# Patient Record
Sex: FEMALE | Race: WHITE | Employment: FULL TIME | URBAN - METROPOLITAN AREA
[De-identification: names, ages, dates, MRNs, and addresses within clinical notes are randomized per-mention and may not be internally consistent; named-entity substitution may affect disease eponyms.]

---

## 2017-02-28 ENCOUNTER — OFFICE VISIT (OUTPATIENT)
Dept: FAMILY MEDICINE CLINIC | Age: 32
End: 2017-02-28

## 2017-02-28 VITALS
SYSTOLIC BLOOD PRESSURE: 130 MMHG | RESPIRATION RATE: 20 BRPM | HEART RATE: 130 BPM | WEIGHT: 293 LBS | BODY MASS INDEX: 50.02 KG/M2 | TEMPERATURE: 98.9 F | DIASTOLIC BLOOD PRESSURE: 91 MMHG | OXYGEN SATURATION: 95 % | HEIGHT: 64 IN

## 2017-02-28 DIAGNOSIS — J02.9 SORE THROAT: Primary | ICD-10-CM

## 2017-02-28 DIAGNOSIS — J11.1 INFLUENZA: ICD-10-CM

## 2017-02-28 LAB
FLUAV+FLUBV AG NOSE QL IA.RAPID: NEGATIVE POS/NEG
FLUAV+FLUBV AG NOSE QL IA.RAPID: NEGATIVE POS/NEG
S PYO AG THROAT QL: NEGATIVE
VALID INTERNAL CONTROL?: YES
VALID INTERNAL CONTROL?: YES

## 2017-02-28 RX ORDER — OSELTAMIVIR PHOSPHATE 75 MG/1
75 CAPSULE ORAL 2 TIMES DAILY
Qty: 10 CAP | Refills: 0 | Status: SHIPPED | OUTPATIENT
Start: 2017-02-28 | End: 2017-03-05

## 2017-02-28 NOTE — PROGRESS NOTES
Subjective: Deanna Montenegro is a 27 y.o. female who present complaining of flu-like symptoms: fevers, chills, myalgias, congestion, sore throat and cough for 1 days. Family member diagnosed with strep recently. She has taken OTC DayQuil. She denies dyspnea or wheezing. Smoking status: non-smoker. Flu vaccine status: vaccinated currently. Relevant PMH: obesity. Review of Systems  Review of Systems   Constitutional: Positive for chills, fever and malaise/fatigue. HENT: Positive for congestion and sore throat. Eyes: Negative for blurred vision. Respiratory: Positive for cough. Negative for sputum production, shortness of breath and wheezing. Cardiovascular: Negative for chest pain. Gastrointestinal: Negative for abdominal pain. Musculoskeletal: Positive for myalgias. Skin: Negative for rash. Neurological: Positive for headaches. Patient Active Problem List   Diagnosis Code    HTN (hypertension) I10    Obesity E66.9    Pre-diabetes R73.03     Patient Active Problem List    Diagnosis Date Noted    Obesity 12/24/2015    Pre-diabetes 12/24/2015    HTN (hypertension) 01/27/2011     Current Outpatient Prescriptions   Medication Sig Dispense Refill    NIFEdipine ER (PROCARDIA XL) 90 mg ER tablet TAKE ONE TABLET BY MOUTH ONCE DAILY 30 Tab 1    lisinopril (PRINIVIL, ZESTRIL) 20 mg tablet TAKE ONE TABLET BY MOUTH ONCE DAILY 30 Tab 5    levonorgestrel (MIRENA) 20 mcg/24 hr IUD 1 Each by IntraUTERine route once. No Known Allergies  Past Medical History:   Diagnosis Date    HTN (hypertension) 1/27/2011    Pap smear 2012    total care for women.       Past Surgical History:   Procedure Laterality Date    HX HEENT  2 months old    tear duct stenosis    HX UROLOGICAL      one kidney a little small, but OK with maturation     Family History   Problem Relation Age of Onset    Hypertension Mother     Elevated Lipids Mother     Hypertension Maternal Grandmother     Elevated Lipids Maternal Grandmother     Stroke Maternal Grandmother     Hypertension Maternal Grandfather     Elevated Lipids Maternal Grandfather     Heart Disease Maternal Grandfather     Hypertension Paternal Grandmother     Elevated Lipids Paternal Grandmother     Hypertension Paternal Grandfather     Elevated Lipids Paternal Grandfather     Cancer Maternal Aunt      leukemia    Hypertension Paternal Aunt      Social History   Substance Use Topics    Smoking status: Never Smoker    Smokeless tobacco: Never Used    Alcohol use 0.0 oz/week     0 Standard drinks or equivalent per week      Comment: occasionally, socially       Objective:     Visit Vitals    BP (!) 130/91    Pulse (!) 130    Temp 98.9 °F (37.2 °C)    Resp 20    Ht 5' 4\" (1.626 m)    Wt 295 lb (133.8 kg)    SpO2 95%    BMI 50.64 kg/m2       Physical Exam   Constitutional: She is oriented to person, place, and time. She appears well-developed and well-nourished. She appears ill. HENT:   Head: Normocephalic and atraumatic. Right Ear: External ear and ear canal normal. Tympanic membrane is not bulging. Left Ear: External ear and ear canal normal. Tympanic membrane is not bulging. Nose: Nose normal.   Mouth/Throat: Oropharynx is clear and moist.   Eyes: Conjunctivae are normal. Pupils are equal, round, and reactive to light. Neck: Normal range of motion. Cardiovascular: Regular rhythm and normal heart sounds. No murmur heard. Pulmonary/Chest: Effort normal and breath sounds normal.   Abdominal: Soft. Lymphadenopathy:     She has no cervical adenopathy. Neurological: She is alert and oriented to person, place, and time. Skin: Skin is warm, dry and intact. Psychiatric: She has a normal mood and affect.      Results for orders placed or performed in visit on 02/28/17   AMB POC CHARLES INFLUENZA A/B TEST   Result Value Ref Range    VALID INTERNAL CONTROL POC Yes     Influenza A Ag POC Negative Negative Pos/Neg    Influenza B Ag POC Negative Negative Pos/Neg   AMB POC RAPID STREP A   Result Value Ref Range    VALID INTERNAL CONTROL POC Yes     Group A Strep Ag Negative Negative       Assessment/Plan:   Influenza very likely from clinical presentation and seasonal pattern  Considerations for specific influenza anti-viral therapy: symptoms present < 48 hours, antiviral therapy is indicated     Symptomatic therapy suggested: rest, increase fluids, gargle prn for sore throat, apply heat to sinuses prn, OTC acetaminophen and call prn if symptoms persist or worsen. Call or return to clinic prn if these symptoms worsen or fail to improve as anticipated.

## 2017-02-28 NOTE — MR AVS SNAPSHOT
Visit Information Date & Time Provider Department Dept. Phone Encounter #  
 2/28/2017 11:00 AM FAST 31 [facility] [phone] [id number] Follow-up Instructions Return if symptoms worsen or fail to improve. Upcoming Health Maintenance Date Due  
 PAP AKA CERVICAL CYTOLOGY 5/1/2015 INFLUENZA AGE 9 TO ADULT 8/1/2016 DTaP/Tdap/Td series (2 - Td) 11/1/2022 Allergies as of 2/28/2017  Review Complete On: 2/28/2017 By: Tia Watson NP No Known Allergies Current Immunizations  Never Reviewed Name Date Tdap 11/1/2012 Not reviewed this visit You Were Diagnosed With   
  
 Codes Comments Sore throat    -  Primary ICD-10-CM: J02.9 ICD-9-CM: 131 Influenza     ICD-10-CM: J11.1 ICD-9-CM: 487. 1 Vitals BP  
  
  
  
  
  
 (!) 130/91 BMI and BSA Data Body Mass Index Body Surface Area  
 50.64 kg/m 2 2.46 m 2 Preferred Pharmacy Pharmacy Name Phone [address] [phone] Your Updated Medication List  
  
   
This list is accurate as of: 2/28/17 11:21 AM.  Always use your most recent med list.  
  
  
  
  
 lisinopril 20 mg tablet Commonly known as:  PRINIVIL, ZESTRIL  
TAKE ONE TABLET BY MOUTH ONCE DAILY MIRENA 20 mcg/24 hr (5 years) IUD Generic drug:  levonorgestrel 1 Each by IntraUTERine route once. NIFEdipine ER 90 mg ER tablet Commonly known as:  PROCARDIA XL  
TAKE ONE TABLET BY MOUTH ONCE DAILY  
  
 oseltamivir 75 mg capsule Commonly known as:  TAMIFLU Take 1 Cap by mouth two (2) times a day for 5 days. Prescriptions Sent to Pharmacy Refills  
 oseltamivir (TAMIFLU) 75 mg capsule 0 Sig: Take 1 Cap by mouth two (2) times a day for 5 days. Class: Normal  
 Pharmacy: [id number] [place] [address], [address] [place] [address] #: [phone]  Route: Oral  
  
 We Performed the Following AMB POC RAPID STREP A [99894 CPT(R)] AMB POC CHARLES INFLUENZA A/B TEST [06439 CPT(R)] Follow-up Instructions Return if symptoms worsen or fail to improve. Patient Instructions Influenza (Flu): Care Instructions Your Care Instructions Influenza (flu) is an infection in the lungs and breathing passages. It is caused by the influenza virus. There are different strains, or types, of the flu virus from year to year. Unlike the common cold, the flu comes on suddenly and the symptoms, such as a cough, congestion, fever, chills, fatigue, aches, and pains, are more severe. These symptoms may last up to 10 days. Although the flu can make you feel very sick, it usually doesn't cause serious health problems. Home treatment is usually all you need for flu symptoms. But your doctor may prescribe antiviral medicine to prevent other health problems, such as pneumonia, from developing. Older people and those who have a long-term health condition, such as lung disease, are most at risk for having pneumonia or other health problems. Follow-up care is a key part of your treatment and safety. Be sure to make and go to all appointments, and call your doctor if you are having problems. Its also a good idea to know your test results and keep a list of the medicines you take. How can you care for yourself at home? · Get plenty of rest. 
· Drink plenty of fluids, enough so that your urine is light yellow or clear like water. If you have kidney, heart, or liver disease and have to limit fluids, talk with your doctor before you increase the amount of fluids you drink. · Take an over-the-counter pain medicine if needed, such as acetaminophen (Tylenol), ibuprofen (Advil, Motrin), or naproxen (Aleve), to relieve fever, headache, and muscle aches. Read and follow all instructions on the label. No one younger than 20 should take aspirin.  It has been linked to Reye syndrome, a serious illness. · Do not smoke. Smoking can make the flu worse. If you need help quitting, talk to your doctor about stop-smoking programs and medicines. These can increase your chances of quitting for good. · Breathe moist air from a hot shower or from a sink filled with hot water to help clear a stuffy nose. · Before you use cough and cold medicines, check the label. These medicines may not be safe for young children or for people with certain health problems. · If the skin around your nose and lips becomes sore, put some petroleum jelly on the area. · To ease coughing: ¨ Drink fluids to soothe a scratchy throat. ¨ Suck on cough drops or plain hard candy. ¨ Take an over-the-counter cough medicine that contains dextromethorphan to help you get some sleep. Read and follow all instructions on the label. ¨ Raise your head at night with an extra pillow. This may help you rest if coughing keeps you awake. · Take any prescribed medicine exactly as directed. Call your doctor if you think you are having a problem with your medicine. To avoid spreading the flu · Wash your hands regularly, and keep your hands away from your face. · Stay home from school, work, and other public places until you are feeling better and your fever has been gone for at least 24 hours. The fever needs to have gone away on its own without the help of medicine. · Ask people living with you to talk to their doctors about preventing the flu. They may get antiviral medicine to keep from getting the flu from you. · To prevent the flu in the future, get a flu vaccine every fall. Encourage people living with you to get the vaccine. · Cover your mouth when you cough or sneeze. When should you call for help? Call 911 anytime you think you may need emergency care. For example, call if: 
· You have severe trouble breathing. Call your doctor now or seek immediate medical care if: 
· You have new or worse trouble breathing. · You seem to be getting much sicker. · You feel very sleepy or confused. · You have a new or higher fever. · You get a new rash. Watch closely for changes in your health, and be sure to contact your doctor if: 
· You begin to get better and then get worse. · You are not getting better after 1 week. Where can you learn more? Go to http://rickie-manjit.info/. Enter J919 in the search box to learn more about \"Influenza (Flu): Care Instructions. \" Current as of: May 23, 2016 Content Version: 11.1 © 8584-5411 [employer]. Care instructions adapted under license by [employer] (which disclaims liability or warranty for this information). If you have questions about a medical condition or this instruction, always ask your healthcare professional. [address] any warranty or liability for your use of this information. Introducing [facility]! Dear Scar Tyler: Thank you for requesting a 5o9 account. Our records indicate that you already have an active 5o9 account. You can access your account anytime at [email or web address] Did you know that you can access your hospital and ER discharge instructions at any time in 5o9? You can also review all of your test results from your hospital stay or ER visit. Additional Information If you have questions, please visit the Frequently Asked Questions section of the 5o9 website at [email or web address] Remember, 5o9 is NOT to be used for urgent needs. For medical emergencies, dial 911. Now available from your iPhone and Android! Please provide this summary of care documentation to your next provider. Your primary care clinician is listed as Maeve Lazar. If you have any questions after today's visit, please call [phone].

## 2017-02-28 NOTE — PATIENT INSTRUCTIONS
Influenza (Flu): Care Instructions  Your Care Instructions  Influenza (flu) is an infection in the lungs and breathing passages. It is caused by the influenza virus. There are different strains, or types, of the flu virus from year to year. Unlike the common cold, the flu comes on suddenly and the symptoms, such as a cough, congestion, fever, chills, fatigue, aches, and pains, are more severe. These symptoms may last up to 10 days. Although the flu can make you feel very sick, it usually doesn't cause serious health problems. Home treatment is usually all you need for flu symptoms. But your doctor may prescribe antiviral medicine to prevent other health problems, such as pneumonia, from developing. Older people and those who have a long-term health condition, such as lung disease, are most at risk for having pneumonia or other health problems. Follow-up care is a key part of your treatment and safety. Be sure to make and go to all appointments, and call your doctor if you are having problems. Its also a good idea to know your test results and keep a list of the medicines you take. How can you care for yourself at home? · Get plenty of rest.  · Drink plenty of fluids, enough so that your urine is light yellow or clear like water. If you have kidney, heart, or liver disease and have to limit fluids, talk with your doctor before you increase the amount of fluids you drink. · Take an over-the-counter pain medicine if needed, such as acetaminophen (Tylenol), ibuprofen (Advil, Motrin), or naproxen (Aleve), to relieve fever, headache, and muscle aches. Read and follow all instructions on the label. No one younger than 20 should take aspirin. It has been linked to Reye syndrome, a serious illness. · Do not smoke. Smoking can make the flu worse. If you need help quitting, talk to your doctor about stop-smoking programs and medicines. These can increase your chances of quitting for good.   · Breathe moist air from a hot shower or from a sink filled with hot water to help clear a stuffy nose. · Before you use cough and cold medicines, check the label. These medicines may not be safe for young children or for people with certain health problems. · If the skin around your nose and lips becomes sore, put some petroleum jelly on the area. · To ease coughing:  ¨ Drink fluids to soothe a scratchy throat. ¨ Suck on cough drops or plain hard candy. ¨ Take an over-the-counter cough medicine that contains dextromethorphan to help you get some sleep. Read and follow all instructions on the label. ¨ Raise your head at night with an extra pillow. This may help you rest if coughing keeps you awake. · Take any prescribed medicine exactly as directed. Call your doctor if you think you are having a problem with your medicine. To avoid spreading the flu  · Wash your hands regularly, and keep your hands away from your face. · Stay home from school, work, and other public places until you are feeling better and your fever has been gone for at least 24 hours. The fever needs to have gone away on its own without the help of medicine. · Ask people living with you to talk to their doctors about preventing the flu. They may get antiviral medicine to keep from getting the flu from you. · To prevent the flu in the future, get a flu vaccine every fall. Encourage people living with you to get the vaccine. · Cover your mouth when you cough or sneeze. When should you call for help? Call 911 anytime you think you may need emergency care. For example, call if:  · You have severe trouble breathing. Call your doctor now or seek immediate medical care if:  · You have new or worse trouble breathing. · You seem to be getting much sicker. · You feel very sleepy or confused. · You have a new or higher fever. · You get a new rash.   Watch closely for changes in your health, and be sure to contact your doctor if:  · You begin to get better and then get worse. · You are not getting better after 1 week. Where can you learn more? Go to http://rickie-manjit.info/. Enter J220 in the search box to learn more about \"Influenza (Flu): Care Instructions. \"  Current as of: May 23, 2016  Content Version: 11.1  © 4575-3687 [employer]. Care instructions adapted under license by [employer] (which disclaims liability or warranty for this information). If you have questions about a medical condition or this instruction, always ask your healthcare professional. [address] any warranty or liability for your use of this information.

## 2017-04-26 ENCOUNTER — OFFICE VISIT (OUTPATIENT)
Dept: INTERNAL MEDICINE CLINIC | Age: 32
End: 2017-04-26

## 2017-04-26 VITALS
HEART RATE: 97 BPM | DIASTOLIC BLOOD PRESSURE: 97 MMHG | SYSTOLIC BLOOD PRESSURE: 149 MMHG | OXYGEN SATURATION: 100 % | TEMPERATURE: 98 F | HEIGHT: 64 IN | WEIGHT: 293 LBS | BODY MASS INDEX: 50.02 KG/M2 | RESPIRATION RATE: 20 BRPM

## 2017-04-26 DIAGNOSIS — E66.01 OBESITY, MORBID, BMI 50 OR HIGHER (HCC): ICD-10-CM

## 2017-04-26 DIAGNOSIS — I10 ESSENTIAL HYPERTENSION: Primary | ICD-10-CM

## 2017-04-26 RX ORDER — LISINOPRIL 20 MG/1
TABLET ORAL
Qty: 90 TAB | Refills: 1 | Status: SHIPPED | OUTPATIENT
Start: 2017-04-26 | End: 2017-09-06 | Stop reason: SDUPTHER

## 2017-04-26 RX ORDER — NIFEDIPINE 90 MG/1
TABLET, EXTENDED RELEASE ORAL
Qty: 90 TAB | Refills: 1 | Status: SHIPPED | OUTPATIENT
Start: 2017-04-26 | End: 2017-09-06 | Stop reason: SDUPTHER

## 2017-04-26 NOTE — PROGRESS NOTES
HISTORY OF PRESENT ILLNESS  Josy Ching is a 27 y.o. female. Here today for routine follow up, requesting refill of her BP meds. No complaints today. Hypertension    The history is provided by the patient. This is a chronic problem. The current episode started more than 1 week ago. Progression since onset: she hasbeen low on her BP meds for several weeks, has been taking these only sporatically. Took one tablet just prior to coming here. Pertinent negatives include no chest pain, no palpitations, no blurred vision, no headaches, no shortness of breath, no nausea and no vomiting. Risk factors include hypertension, obesity and family history. Obesity  She has gained 12 lbs in the last year. She tells me that she did do the jaya fast earlier this year, following a low carb low calorie diet and was able to lose 15 lbs, but then gained it all back once she finished. She states that she has been eating out frequently because her  now work on night shift. She is now interested in the MSWL program because she feels she can not get the weight off on her own. She continues to follow with [facility] for her well woman checks, she states she is due for follow up with them. She has a mirena, due for change 1/2018. Past Medical History:   Diagnosis Date    HTN (hypertension) 1/27/2011    Pap smear 2012    [facility]. Past Surgical History:   Procedure Laterality Date    HX HEENT  2 months old    tear duct stenosis    HX UROLOGICAL      one kidney a little small, but OK with maturation     Current Outpatient Prescriptions   Medication Sig    NIFEdipine ER (PROCARDIA XL) 90 mg ER tablet TAKE ONE TABLET BY MOUTH ONCE DAILY    lisinopril (PRINIVIL, ZESTRIL) 20 mg tablet TAKE ONE TABLET BY MOUTH ONCE DAILY    levonorgestrel (MIRENA) 20 mcg/24 hr IUD 1 Each by IntraUTERine route once. No current facility-administered medications for this visit.       Allergies and Intolerances:   No Known Allergies  Family History:   Family History   Problem Relation Age of Onset    Hypertension Mother     Elevated Lipids Mother     Hypertension Maternal Grandmother     Elevated Lipids Maternal Grandmother     Stroke Maternal Grandmother     Hypertension Maternal Grandfather     Elevated Lipids Maternal Grandfather     Heart Disease Maternal Grandfather     Hypertension Paternal Grandmother     Elevated Lipids Paternal Grandmother     Hypertension Paternal Grandfather     Elevated Lipids Paternal Grandfather     Cancer Maternal Aunt      leukemia    Hypertension Paternal Aunt         Social History:   She  reports that she has never smoked. She has never used smokeless tobacco.  She  reports that she drinks alcohol. Vitals:   Visit Vitals    BP (!) 149/97 (BP 1 Location: Left arm, BP Patient Position: Sitting)    Pulse 97    Temp 98 °F (36.7 °C) (Oral)    Resp 20    Ht 5' 4\" (1.626 m)    Wt 307 lb (139.3 kg)    SpO2 100%    BMI 52.7 kg/m2        Body surface area is 2.51 meters squared. BP Readings from Last 3 Encounters:   04/26/17 (!) 149/97   02/28/17 (!) 130/91   02/02/16 (!) 132/97        Wt Readings from Last 3 Encounters:   04/26/17 307 lb (139.3 kg)   02/28/17 295 lb (133.8 kg)   02/02/16 288 lb (130.6 kg)        Case and notes discussed with MA and reviewed with patient for accuracy. I have personally reviewed and subsequently discussed with the MA any pertinent, preliminary and incomplete elements of the history related to the chief complaint that were recorded by the MA in the patients chart during the initial rooming of the patient. As I have explored the necessary and required elements in detail with the patient during my personal interview with them, I have personally verified, clarified, amended, added to and/or revised said elements based on information acquired during during the interview.         Review of Systems   Constitutional: Negative for chills and fever. HENT: Negative for congestion, ear pain and sore throat. Eyes: Negative for blurred vision and double vision. Respiratory: Negative for shortness of breath and wheezing. Cardiovascular: Negative for chest pain and palpitations. Gastrointestinal: Negative for abdominal pain, blood in stool, constipation, diarrhea, melena, nausea and vomiting. Genitourinary: Negative for dysuria and hematuria. Skin: Negative for itching and rash. Neurological: Negative for seizures, loss of consciousness and headaches. Physical Exam   Constitutional: She appears well-developed and well-nourished. No distress. Obese. HENT:   Head: Normocephalic and atraumatic. Nose: Nose normal.   Mouth/Throat: Uvula is midline, oropharynx is clear and moist and mucous membranes are normal.   Eyes: Conjunctivae are normal. Pupils are equal, round, and reactive to light. Cardiovascular: Normal rate, regular rhythm and normal heart sounds. Pulmonary/Chest: Effort normal and breath sounds normal. No respiratory distress. She has no wheezes. Abdominal: Soft. Bowel sounds are normal. She exhibits no distension. There is no tenderness. Musculoskeletal: She exhibits no edema. Neurological: She is alert. Skin: Skin is warm and dry. No rash noted. Psychiatric: She has a normal mood and affect. Her behavior is normal.   Nursing note and vitals reviewed. ASSESSMENT and PLAN    ICD-10-CM ICD-9-CM    1. Essential hypertension: elevated today. Advised her to always take her BP pills daily as directed. Refilled these for her today, advised her to return in 2 weeks for a BP recheck once she starts taking these regularly again. She agrees to plan. Discussed great need for weight loss today as well.   I10 401.9 NIFEdipine ER (PROCARDIA XL) 90 mg ER tablet      lisinopril (PRINIVIL, ZESTRIL) 20 mg tablet      METABOLIC PANEL, COMPREHENSIVE      URINALYSIS W/ RFLX MICROSCOPIC      TSH 3RD GENERATION      LIPID PANEL      HEMOGLOBIN A1C WITH EAG      CBC WITH AUTOMATED DIFF   2. Ob[facility], morbid, [facility] or [facility] spent much time discussing the seriousness of need for weight loss. Resources given today on calorie and carb counting. She is interested int he MSWL program now, and will be signing up for this as well. E66.01 278.01 NIFEdipine ER (PROCARDIA XL) 90 mg ER tablet      lisinopril (PRINIVIL, ZESTRIL) 20 mg tablet      METABOLIC PANEL, COMPREHENSIVE      URINALYSIS W/ RFLX MICROSCOPIC      TSH 3RD GENERATION      LIPID PANEL      HEMOGLOBIN A1C WITH EAG      CBC WITH AUTOMATED DIFF     Cedric Almanza was seen today for hypertension and medication refill. Diagnoses and all orders for this visit:    Essential hypertension  -     NIFEdipine ER (PROCARDIA XL) 90 mg ER tablet; TAKE ONE TABLET BY MOUTH ONCE DAILY  -     lisinopril (PRINIVIL, ZESTRIL) 20 mg tablet; TAKE ONE TABLET BY MOUTH ONCE DAILY  -     METABOLIC PANEL, COMPREHENSIVE; Future  -     URINALYSIS W/ RFLX MICROSCOPIC; Future  -     TSH 3RD GENERATION; Future  -     LIPID PANEL; Future  -     HEMOGLOBIN A1C WITH EAG; Future  -     CBC WITH AUTOMATED DIFF; Future    Obesity, morbid, BMI 50 or higher ([facility])  -     NIFEdipine ER (PROCARDIA XL) 90 mg ER tablet; TAKE ONE TABLET BY MOUTH ONCE DAILY  -     lisinopril (PRINIVIL, ZESTRIL) 20 mg tablet; TAKE ONE TABLET BY MOUTH ONCE DAILY  -     METABOLIC PANEL, COMPREHENSIVE; Future  -     URINALYSIS W/ RFLX MICROSCOPIC; Future  -     TSH 3RD GENERATION; Future  -     LIPID PANEL; Future  -     HEMOGLOBIN A1C WITH EAG; Future  -     CBC WITH AUTOMATED DIFF; Future      Follow-up Disposition:  Return in about 2 weeks (around 5/10/2017), or if symptoms worsen or fail to improve. 2 week BP check. Notified patient that I will be leaving the state, she would like to continue to follow a this practice with Dr Bella Krishnan. The plan of care was discussed with the patient, who verbalizes understanding. Discussed all medications, side effects with patient. The patient is to follow up as scheduled and will report to the ED or the office if symptoms change or increase. The patient has voiced understanding and will comply to plan of care.

## 2017-04-26 NOTE — PATIENT INSTRUCTIONS
Health Maintenance Due   Topic Date Due    PAP AKA CERVICAL CYTOLOGY  05/01/2015    INFLUENZA AGE 9 TO ADULT  08/01/2016     Patient was given a copy of the Advanced Directive Form and understands to bring it in once completed. Call to schedule your well woman exam exam.          High Blood Pressure: Care Instructions  Your Care Instructions  If your blood pressure is usually above 140/90, you have high blood pressure, or hypertension. That means the top number is 140 or higher or the bottom number is 90 or higher, or both. Despite what a lot of people think, high blood pressure usually doesn't cause headaches or make you feel dizzy or lightheaded. It usually has no symptoms. But it does increase your risk for heart attack, stroke, and kidney or eye damage. The higher your blood pressure, the more your risk increases. Your doctor will give you a goal for your blood pressure. Your goal will be based on your health and your age. An example of a goal is to keep your blood pressure below 140/90. Lifestyle changes, such as eating healthy and being active, are always important to help lower blood pressure. You might also take medicine to reach your blood pressure goal.  Follow-up care is a key part of your treatment and safety. Be sure to make and go to all appointments, and call your doctor if you are having problems. It's also a good idea to know your test results and keep a list of the medicines you take. How can you care for yourself at home? Medical treatment  · If you stop taking your medicine, your blood pressure will go back up. You may take one or more types of medicine to lower your blood pressure. Be safe with medicines. Take your medicine exactly as prescribed. Call your doctor if you think you are having a problem with your medicine. · Talk to your doctor before you start taking aspirin every day. Aspirin can help certain people lower their risk of a heart attack or stroke.  But taking aspirin isn't right for everyone, because it can cause serious bleeding. · See your doctor regularly. You may need to see the doctor more often at first or until your blood pressure comes down. · If you are taking blood pressure medicine, talk to your doctor before you take decongestants or anti-inflammatory medicine, such as ibuprofen. Some of these medicines can raise blood pressure. · Learn how to check your blood pressure at home. Lifestyle changes  · Stay at a healthy weight. This is especially important if you put on weight around the waist. Losing even 10 pounds can help you lower your blood pressure. · If your doctor recommends it, get more exercise. Walking is a good choice. Bit by bit, increase the amount you walk every day. Try for at least 30 minutes on most days of the week. You also may want to swim, bike, or do other activities. · Avoid or limit alcohol. Talk to your doctor about whether you can drink any alcohol. · Try to limit how much sodium you eat to less than 2,300 milligrams (mg) a day. Your doctor may ask you to try to eat less than 1,500 mg a day. · Eat plenty of fruits (such as bananas and oranges), vegetables, legumes, whole grains, and low-fat dairy products. · Lower the amount of saturated fat in your diet. Saturated fat is found in animal products such as milk, cheese, and meat. Limiting these foods may help you lose weight and also lower your risk for heart disease. · Do not smoke. Smoking increases your risk for heart attack and stroke. If you need help quitting, talk to your doctor about stop-smoking programs and medicines. These can increase your chances of quitting for good. When should you call for help? Call 911 anytime you think you may need emergency care. This may mean having symptoms that suggest that your blood pressure is causing a serious heart or blood vessel problem. Your blood pressure may be over 180/110. For example, call 911 if:  · You have symptoms of a heart attack. These may include:  ¨ Chest pain or pressure, or a strange feeling in the chest.  ¨ Sweating. ¨ Shortness of breath. ¨ Nausea or vomiting. ¨ Pain, pressure, or a strange feeling in the back, neck, jaw, or upper belly or in one or both shoulders or arms. ¨ Lightheadedness or sudden weakness. ¨ A fast or irregular heartbeat. · You have symptoms of a stroke. These may include:  ¨ Sudden numbness, tingling, weakness, or loss of movement in your face, arm, or leg, especially on only one side of your body. ¨ Sudden vision changes. ¨ Sudden trouble speaking. ¨ Sudden confusion or trouble understanding simple statements. ¨ Sudden problems with walking or balance. ¨ A sudden, severe headache that is different from past headaches. · You have severe back or belly pain. Do not wait until your blood pressure comes down on its own. Get help right away. Call your doctor now or seek immediate care if:  · Your blood pressure is much higher than normal (such as 180/110 or higher), but you don't have symptoms. · You think high blood pressure is causing symptoms, such as:  ¨ Severe headache. ¨ Blurry vision. Watch closely for changes in your health, and be sure to contact your doctor if:  · Your blood pressure measures 140/90 or higher at least 2 times. That means the top number is 140 or higher or the bottom number is 90 or higher, or both. · You think you may be having side effects from your blood pressure medicine. · Your blood pressure is usually normal, but it goes above normal at least 2 times. Where can you learn more? Go to http://rickie-manjit.info/. Enter S189 in the search box to learn more about \"High Blood Pressure: Care Instructions. \"  Current as of: August 8, 2016  Content Version: 11.2  © 4279-0671 [employer]. Care instructions adapted under license by [employer] (which disclaims liability or warranty for this information).  If you have questions about a medical condition or this instruction, always ask your healthcare professional. [address] any warranty or liability for your use of this information. Body Mass Index: Care Instructions  Your Care Instructions    Body mass index (BMI) can help you see if your weight is raising your risk for health problems. It uses a formula to compare how much you weigh with how tall you are. · A BMI lower than 18.5 is considered underweight. · A BMI between 18.5 and 24.9 is considered healthy. · A BMI between 25 and 29.9 is considered overweight. A BMI of 30 or higher is considered obese. If your BMI is in the normal range, it means that you have a lower risk for weight-related health problems. If your BMI is in the overweight or obese range, you may be at increased risk for weight-related health problems, such as high blood pressure, heart disease, stroke, arthritis or joint pain, and diabetes. If your BMI is in the underweight range, you may be at increased risk for health problems such as fatigue, lower protection (immunity) against illness, muscle loss, bone loss, hair loss, and hormone problems. BMI is just one measure of your risk for weight-related health problems. You may be at higher risk for health problems if you are not active, you eat an unhealthy diet, or you drink too much alcohol or use tobacco products. Follow-up care is a key part of your treatment and safety. Be sure to make and go to all appointments, and call your doctor if you are having problems. It's also a good idea to know your test results and keep a list of the medicines you take. How can you care for yourself at home? · Practice healthy eating habits. This includes eating plenty of fruits, vegetables, whole grains, lean protein, and low-fat dairy. · If your doctor recommends it, get more exercise. Walking is a good choice. Bit by bit, increase the amount you walk every day.  Try for at least 30 minutes on most days of the week. · Do not smoke. Smoking can increase your risk for health problems. If you need help quitting, talk to your doctor about stop-smoking programs and medicines. These can increase your chances of quitting for good. · Limit alcohol to 2 drinks a day for men and 1 drink a day for women. Too much alcohol can cause health problems. If you have a BMI higher than 25  · Your doctor may do other tests to check your risk for weight-related health problems. This may include measuring the distance around your waist. A waist measurement of more than 40 inches in men or 35 inches in women can increase the risk of weight-related health problems. · Talk with your doctor about steps you can take to stay healthy or improve your health. You may need to make lifestyle changes to lose weight and stay healthy, such as changing your diet and getting regular exercise. If you have a BMI lower than 18.5  · Your doctor may do other tests to check your risk for health problems. · Talk with your doctor about steps you can take to stay healthy or improve your health. You may need to make lifestyle changes to gain or maintain weight and stay healthy, such as getting more healthy foods in your diet and doing exercises to build muscle. Where can you learn more? Go to http://rickie-manjit.info/. Enter S176 in the search box to learn more about \"Body Mass Index: Care Instructions. \"  Current as of: January 23, 2017  Content Version: 11.2  © 9707-8841 [employer]. Care instructions adapted under license by [employer] (which disclaims liability or warranty for this information). If you have questions about a medical condition or this instruction, always ask your healthcare professional. [place] [address] any warranty or liability for your use of this information. Learning About Dietary Guidelines  What are the Dietary Guidelines for Americans?     Dietary Guidelines for Americans provide tips for eating well and staying healthy. This helps reduce the risk for long-term (chronic) diseases. These adult guidelines from the [place] recommend that you:  · Eat lots of fruits, vegetables, whole grains, and low-fat or nonfat dairy products. · Try to balance your eating with your activity. This helps you stay at a healthy weight. · Drink alcohol in moderation, if at all. · Limit foods high in salt, saturated fat, trans fat, and added sugar. What is MyPlate? MyPlate is the [place] government's food guide. It can help you make your own well-balanced eating plan. A balanced eating plan means that you eat enough, but not too much, and that your food gives you the nutrients you need to stay healthy. MyPlate focuses on eating plenty of whole grains, fruits, and vegetables, and on limiting fat and sugar. It is available online at www. ChooseMyPlate.gov. How can you get started? MyPlate suggests that most adults eat certain amounts from the different food groups:  Grains  Eat 5 to 8 ounces of grains each day. Half of those should be whole grains. Choose whole-grain breads, cold and cooked cereals and grains, pasta (without creamy sauces), hard rolls, or low-fat or fat-free crackers. Vegetables  Eat 2 to 3 cups of vegetables every day. They contain little if any fat. And they have lots of nutrients that help protect against heart disease. Fruits  Eat 1½ to 2 cups of fruits every day. Fruits contain very little fat but lots of nutrients. Protein foods  Most adults need 5 to 6½ ounces each day. Choose fish and lean poultry more often. Eat red meat and fried meats less often. Dried beans, tofu, and nuts are also good sources of protein. Dairy  Most adults need 3 cups of milk and milk products a day. Choose low-fat or fat-free products from this food group. If you have problems digesting milk, try eating cheese or yogurt instead.   Limit fats and oils, including those used in cooking. When you do use fats, choose oils that are liquid at room temperature (unsaturated fats). These include canola oil and olive oil. Avoid foods with trans fats, such as many fried foods, cookies, and snack foods. Where can you learn more? Go to http://rickie-manjit.info/. Enter J652 in the search box to learn more about \"Learning About Dietary Guidelines. \"  Current as of: July 26, 2016  Content Version: 11.2  © 1157-0964 [employer]. Care instructions adapted under license by [employer] (which disclaims liability or warranty for this information). If you have questions about a medical condition or this instruction, always ask your healthcare professional. [address] any warranty or liability for your use of this information.

## 2017-04-26 NOTE — PROGRESS NOTES
Chief Complaint   Patient presents with    Hypertension     follow up    Medication Refill     send to local pharmacy     1. Have you been to the ER, urgent care clinic since your last visit? Hospitalized since your last visit? No    2. Have you seen or consulted any other health care providers outside of the [address] [place] [address] since your last visit? Include any pap smears or colon screening. No    Patient was given a copy of the Advanced Directive Form and understands to bring it in once completed.

## 2017-04-27 DIAGNOSIS — E66.01 OBESITY, MORBID, BMI 50 OR HIGHER (HCC): ICD-10-CM

## 2017-04-27 DIAGNOSIS — I10 ESSENTIAL HYPERTENSION: ICD-10-CM

## 2017-05-08 ENCOUNTER — OFFICE VISIT (OUTPATIENT)
Dept: INTERNAL MEDICINE CLINIC | Age: 32
End: 2017-05-08

## 2017-05-08 VITALS
RESPIRATION RATE: 22 BRPM | WEIGHT: 293 LBS | TEMPERATURE: 98.4 F | HEIGHT: 64 IN | OXYGEN SATURATION: 98 % | HEART RATE: 78 BPM | SYSTOLIC BLOOD PRESSURE: 137 MMHG | DIASTOLIC BLOOD PRESSURE: 87 MMHG | BODY MASS INDEX: 50.02 KG/M2

## 2017-05-08 DIAGNOSIS — I10 ESSENTIAL HYPERTENSION: Primary | ICD-10-CM

## 2017-05-08 DIAGNOSIS — E66.01 OBESITY, MORBID, BMI 50 OR HIGHER (HCC): ICD-10-CM

## 2017-05-08 NOTE — PROGRESS NOTES
HISTORY OF PRESENT ILLNESS  Whitman Meigs is a 34 y.o. female. Here today for 2 week BP recheck. HPI   HTN  She has restarted both procardia XL 90mg and lisinopril 20mg daily. BP noted to be much improved today. Denies any problems with medications. Denies any CP, SOB, leg swelling, headaches. She has been working on improving her diet, following a low carb high protein diet. She has lost 1 lbs but states she did initially lose more than that, but went camping with her family and gained much of it back already. Scheduled for [facility] program this summer. Past Medical History:   Diagnosis Date    HTN (hypertension) 1/27/2011    Pap smear 2012    total care for women. Past Surgical History:   Procedure Laterality Date    HX HEENT  2 months old    tear duct stenosis    HX UROLOGICAL      one kidney a little small, but OK with maturation     Current Outpatient Prescriptions   Medication Sig    NIFEdipine ER (PROCARDIA XL) 90 mg ER tablet TAKE ONE TABLET BY MOUTH ONCE DAILY    lisinopril (PRINIVIL, ZESTRIL) 20 mg tablet TAKE ONE TABLET BY MOUTH ONCE DAILY    levonorgestrel (MIRENA) 20 mcg/24 hr IUD 1 Each by IntraUTERine route once. No current facility-administered medications for this visit. Allergies and Intolerances:   No Known Allergies  Family History:   Family History   Problem Relation Age of Onset    Hypertension Mother     Elevated Lipids Mother     Hypertension Maternal Grandmother     Elevated Lipids Maternal Grandmother     Stroke Maternal Grandmother     Hypertension Maternal Grandfather     Elevated Lipids Maternal Grandfather     Heart Disease Maternal Grandfather     Hypertension Paternal Grandmother     Elevated Lipids Paternal Grandmother     Hypertension Paternal Grandfather     Elevated Lipids Paternal Grandfather     Cancer Maternal Aunt      leukemia    Hypertension Paternal Aunt         Social History:   She  reports that she has never smoked.  She has never used smokeless tobacco.  She  reports that she drinks alcohol. Vitals:   Visit Vitals    /87    Pulse 78    Temp 98.4 °F (36.9 °C)    Resp 22    Ht 5' 4\" (1.626 m)    Wt 306 lb (138.8 kg)    SpO2 98%    BMI 52.52 kg/m2        Body surface area is 2.5 meters squared. BP Readings from Last 3 Encounters:   05/08/17 137/87   04/26/17 (!) 149/97   02/28/17 (!) 130/91        Wt Readings from Last 3 Encounters:   05/08/17 306 lb (138.8 kg)   04/26/17 307 lb (139.3 kg)   02/28/17 295 lb (133.8 kg)        Case and notes discussed with MA and reviewed with patient for accuracy. I have personally reviewed and subsequently discussed with the MA any pertinent, preliminary and incomplete elements of the history related to the chief complaint that were recorded by the MA in the patients chart during the initial rooming of the patient. As I have explored the necessary and required elements in detail with the patient during my personal interview with them, I have personally verified, clarified, amended, added to and/or revised said elements based on information acquired during during the interview. Review of Systems   Constitutional: Negative for chills and fever. HENT: Negative for congestion, ear pain and sore throat. Eyes: Negative for blurred vision and double vision. Respiratory: Negative for shortness of breath and wheezing. Cardiovascular: Negative for chest pain. Gastrointestinal: Negative for abdominal pain, constipation, diarrhea, nausea and vomiting. Genitourinary: Negative for dysuria and hematuria. Skin: Negative for itching and rash. Neurological: Negative for seizures, loss of consciousness and headaches. Physical Exam   Constitutional: She appears well-developed and well-nourished. No distress. Obese. HENT:   Head: Normocephalic and atraumatic.    Nose: Nose normal.   Mouth/Throat: Uvula is midline, oropharynx is clear and moist and mucous membranes are normal.   Eyes: Conjunctivae are normal. Pupils are equal, round, and reactive to light. Cardiovascular: Normal rate, regular rhythm and normal heart sounds. Pulmonary/Chest: Effort normal and breath sounds normal. No respiratory distress. She has no wheezes. Abdominal: Soft. Bowel sounds are normal. She exhibits no distension. There is no tenderness. Musculoskeletal: She exhibits no edema. Neurological: She is alert. Skin: Skin is warm and dry. No rash noted. Psychiatric: She has a normal mood and affect. Her behavior is normal.   Nursing note and vitals reviewed. ASSESSMENT and PLAN    ICD-10-CM ICD-9-CM    1. Essential hypertension: improved since restarting procardia xl and lisinopril. Will continue with current regiment and continue to work towards weight loss. She agrees to have labs completed this week as well. I10 401.9    2. Obesity, morbid, BMI 50 or higher ([address]): she has lost 1 lbs since visit 2 weeks ago, but states that she has been working on improving her diet, high protein, low carb options. She is scheduled for [facility] orientation this summer as well and is anxious to start this. E66.01 278.01      Alexis Roberto was seen today for blood pressure check. Diagnoses and all orders for this visit:    Essential hypertension    Obesity, morbid, BMI 50 or higher ([address]      Follow-up Disposition:  Return in about 6 months (around 11/8/2017), or if symptoms worsen or fail to improve. Notified patient that I will be leaving the state. She would like to continue to follow here at this office with Dr Chau Funes. The plan of care was discussed with the patient, who verbalizes understanding. Discussed all medications, side effects with patient. The patient is to follow up as scheduled and will report to the ED or the office if symptoms change or increase. The patient has voiced understanding and will comply to plan of care.

## 2017-05-08 NOTE — MR AVS SNAPSHOT
Visit Information Date & Time Provider Department Dept. Phone Encounter #  
 5/8/2017 11:30 AM Agustin Kang Internist of [address] [id number] Follow-up Instructions Return in about 6 months (around 11/8/2017), or if symptoms worsen or fail to improve. Your Appointments 7/20/2017  9:00 AM  
Nurse Visit with [address] Metabolic Program ([facility]) Appt Note: [place] , weight 307, height 5ft 4 HR Metabolic Program ([facility]) [phone]  
  
    
 11/8/2017  9:30 AM  
Office Visit with Caprice Scott MD  
Internist of [facility]) Appt Note: ov 6mos. gaffney [address] [facility], [place] [address] [place]  
  
   
 [address] [facility] Upcoming Health Maintenance Date Due  
 PAP AKA CERVICAL CYTOLOGY 5/1/2015 INFLUENZA AGE 9 TO ADULT 8/1/2017 DTaP/Tdap/Td series (2 - Td) 11/1/2022 Allergies as of 5/8/2017  Review Complete On: 5/8/2017 By: Richy Conway NP No Known Allergies Current Immunizations  Never Reviewed Name Date Tdap 11/1/2012 Not reviewed this visit Vitals BP Pulse Temp Resp Height(growth percentile) Weight(growth percentile) 137/87 78 98.4 °F (36.9 °C) 22 5' 4\" (1.626 m) 306 lb (138.8 kg) SpO2 BMI OB Status Smoking Status 98% 52.52 kg/m2 IUD Never Smoker Vitals History BMI and BSA Data Body Mass Index Body Surface Area 52.52 kg/m 2 2.5 m 2 Preferred Pharmacy Pharmacy Name Phone [id number], [address] [phone] Your Updated Medication List  
  
   
This list is accurate as of: 5/8/17 12:09 PM.  Always use your most recent med list.  
  
  
  
  
 lisinopril 20 mg tablet Commonly known as:  PRINIVIL, ZESTRIL  
TAKE ONE TABLET BY MOUTH ONCE DAILY MIRENA 20 mcg/24 hr (5 years) IUD Generic drug:  levonorgestrel 1 Each by IntraUTERine route once. NIFEdipine ER 90 mg ER tablet Commonly known as:  PROCARDIA XL  
TAKE ONE TABLET BY MOUTH ONCE DAILY Follow-up Instructions Return in about 6 months (around 11/8/2017), or if symptoms worsen or fail to improve. Patient Instructions Body Mass Index: Care Instructions Your Care Instructions Body mass index (BMI) can help you see if your weight is raising your risk for health problems. It uses a formula to compare how much you weigh with how tall you are. · A BMI lower than 18.5 is considered underweight. · A BMI between 18.5 and 24.9 is considered healthy. · A BMI between 25 and 29.9 is considered overweight. A BMI of 30 or higher is considered obese. If your BMI is in the normal range, it means that you have a lower risk for weight-related health problems. If your BMI is in the overweight or obese range, you may be at increased risk for weight-related health problems, such as high blood pressure, heart disease, stroke, arthritis or joint pain, and diabetes. If your BMI is in the underweight range, you may be at increased risk for health problems such as fatigue, lower protection (immunity) against illness, muscle loss, bone loss, hair loss, and hormone problems. BMI is just one measure of your risk for weight-related health problems. You may be at higher risk for health problems if you are not active, you eat an unhealthy diet, or you drink too much alcohol or use tobacco products. Follow-up care is a key part of your treatment and safety. Be sure to make and go to all appointments, and call your doctor if you are having problems. It's also a good idea to know your test results and keep a list of the medicines you take. How can you care for yourself at home? · Practice healthy eating habits.  This includes eating plenty of fruits, vegetables, whole grains, lean protein, and low-fat dairy. · If your doctor recommends it, get more exercise. Walking is a good choice. Bit by bit, increase the amount you walk every day. Try for at least 30 minutes on most days of the week. · Do not smoke. Smoking can increase your risk for health problems. If you need help quitting, talk to your doctor about stop-smoking programs and medicines. These can increase your chances of quitting for good. · Limit alcohol to 2 drinks a day for men and 1 drink a day for women. Too much alcohol can cause health problems. If you have a BMI higher than 25 · Your doctor may do other tests to check your risk for weight-related health problems. This may include measuring the distance around your waist. A waist measurement of more than 40 inches in men or 35 inches in women can increase the risk of weight-related health problems. · Talk with your doctor about steps you can take to stay healthy or improve your health. You may need to make lifestyle changes to lose weight and stay healthy, such as changing your diet and getting regular exercise. If you have a BMI lower than 18.5 · Your doctor may do other tests to check your risk for health problems. · Talk with your doctor about steps you can take to stay healthy or improve your health. You may need to make lifestyle changes to gain or maintain weight and stay healthy, such as getting more healthy foods in your diet and doing exercises to build muscle. Where can you learn more? Go to http://rickie-manjit.info/. Enter S176 in the search box to learn more about \"Body Mass Index: Care Instructions. \" Current as of: January 23, 2017 Content Version: 11.2 © 5516-2603 [employer]wise[employer]. Care instructions adapted under license by [employer] (which disclaims liability or warranty for this information).  If you have questions about a medical condition or this instruction, always ask your healthcare professional. [address] any warranty or liability for your use of this information. High Blood Pressure: Care Instructions Your Care Instructions If your blood pressure is usually above 140/90, you have high blood pressure, or hypertension. That means the top number is 140 or higher or the bottom number is 90 or higher, or both. Despite what a lot of people think, high blood pressure usually doesn't cause headaches or make you feel dizzy or lightheaded. It usually has no symptoms. But it does increase your risk for heart attack, stroke, and kidney or eye damage. The higher your blood pressure, the more your risk increases. Your doctor will give you a goal for your blood pressure. Your goal will be based on your health and your age. An example of a goal is to keep your blood pressure below 140/90. Lifestyle changes, such as eating healthy and being active, are always important to help lower blood pressure. You might also take medicine to reach your blood pressure goal. 
Follow-up care is a key part of your treatment and safety. Be sure to make and go to all appointments, and call your doctor if you are having problems. It's also a good idea to know your test results and keep a list of the medicines you take. How can you care for yourself at home? Medical treatment · If you stop taking your medicine, your blood pressure will go back up. You may take one or more types of medicine to lower your blood pressure. Be safe with medicines. Take your medicine exactly as prescribed. Call your doctor if you think you are having a problem with your medicine. · Talk to your doctor before you start taking aspirin every day. Aspirin can help certain people lower their risk of a heart attack or stroke. But taking aspirin isn't right for everyone, because it can cause serious bleeding. · See your doctor regularly. You may need to see the doctor more often at first or until your blood pressure comes down. · If you are taking blood pressure medicine, talk to your doctor before you take decongestants or anti-inflammatory medicine, such as ibuprofen. Some of these medicines can raise blood pressure. · Learn how to check your blood pressure at home. Lifestyle changes · Stay at a healthy weight. This is especially important if you put on weight around the waist. Losing even 10 pounds can help you lower your blood pressure. · If your doctor recommends it, get more exercise. Walking is a good choice. Bit by bit, increase the amount you walk every day. Try for at least 30 minutes on most days of the week. You also may want to swim, bike, or do other activities. · Avoid or limit alcohol. Talk to your doctor about whether you can drink any alcohol. · Try to limit how much sodium you eat to less than 2,300 milligrams (mg) a day. Your doctor may ask you to try to eat less than 1,500 mg a day. · Eat plenty of fruits (such as bananas and oranges), vegetables, legumes, whole grains, and low-fat dairy products. · Lower the amount of saturated fat in your diet. Saturated fat is found in animal products such as milk, cheese, and meat. Limiting these foods may help you lose weight and also lower your risk for heart disease. · Do not smoke. Smoking increases your risk for heart attack and stroke. If you need help quitting, talk to your doctor about stop-smoking programs and medicines. These can increase your chances of quitting for good. When should you call for help? Call 911 anytime you think you may need emergency care. This may mean having symptoms that suggest that your blood pressure is causing a serious heart or blood vessel problem. Your blood pressure may be over 180/110. For example, call 911 if: 
· You have symptoms of a heart attack. These may include: ¨ Chest pain or pressure, or a strange feeling in the chest. 
¨ Sweating. ¨ Shortness of breath. ¨ Nausea or vomiting. ¨ Pain, pressure, or a strange feeling in the back, neck, jaw, or upper belly or in one or both shoulders or arms. ¨ Lightheadedness or sudden weakness. ¨ A fast or irregular heartbeat. · You have symptoms of a stroke. These may include: 
¨ Sudden numbness, tingling, weakness, or loss of movement in your face, arm, or leg, especially on only one side of your body. ¨ Sudden vision changes. ¨ Sudden trouble speaking. ¨ Sudden confusion or trouble understanding simple statements. ¨ Sudden problems with walking or balance. ¨ A sudden, severe headache that is different from past headaches. · You have severe back or belly pain. Do not wait until your blood pressure comes down on its own. Get help right away. Call your doctor now or seek immediate care if: 
· Your blood pressure is much higher than normal (such as 180/110 or higher), but you don't have symptoms. · You think high blood pressure is causing symptoms, such as: ¨ Severe headache. ¨ Blurry vision. Watch closely for changes in your health, and be sure to contact your doctor if: 
· Your blood pressure measures 140/90 or higher at least 2 times. That means the top number is 140 or higher or the bottom number is 90 or higher, or both. · You think you may be having side effects from your blood pressure medicine. · Your blood pressure is usually normal, but it goes above normal at least 2 times. Where can you learn more? Go to http://rickie-manjit.info/. Enter I747 in the search box to learn more about \"High Blood Pressure: Care Instructions. \" Current as of: August 8, 2016 Content Version: 11.2 © 1035-7479 [employer]. Care instructions adapted under license by [employer] (which disclaims liability or warranty for this information).  If you have questions about a medical condition or this instruction, always ask your healthcare professional. [address] any warranty or liability for your use of this information. Introducing [facility]! Dear Tawanna Robledo: Thank you for requesting a [employer] account. Our records indicate that you already have an active [employer] account. You can access your account anytime at [email or web address] Did you know that you can access your hospital and ER discharge instructions at any time in [employer]? You can also review all of your test results from your hospital stay or ER visit. Additional Information If you have questions, please visit the Frequently Asked Questions section of the [employer] website at [email or web address] Remember, [employer] is NOT to be used for urgent needs. For medical emergencies, dial 911. Now available from your iPhone and Android! Please provide this summary of care documentation to your next provider. Your primary care clinician is listed as Divina Dill. If you have any questions after today's visit, please call [phone].

## 2017-05-08 NOTE — PROGRESS NOTES
1. Have you been to the ER, urgent care clinic since your last visit? Hospitalized since your last visit? No    2. Have you seen or consulted any other health care providers outside of the [address] [place] [address] since your last visit? Include any pap smears or colon screening.  No

## 2017-05-08 NOTE — PATIENT INSTRUCTIONS
Body Mass Index: Care Instructions  Your Care Instructions    Body mass index (BMI) can help you see if your weight is raising your risk for health problems. It uses a formula to compare how much you weigh with how tall you are. · A BMI lower than 18.5 is considered underweight. · A BMI between 18.5 and 24.9 is considered healthy. · A BMI between 25 and 29.9 is considered overweight. A BMI of 30 or higher is considered obese. If your BMI is in the normal range, it means that you have a lower risk for weight-related health problems. If your BMI is in the overweight or obese range, you may be at increased risk for weight-related health problems, such as high blood pressure, heart disease, stroke, arthritis or joint pain, and diabetes. If your BMI is in the underweight range, you may be at increased risk for health problems such as fatigue, lower protection (immunity) against illness, muscle loss, bone loss, hair loss, and hormone problems. BMI is just one measure of your risk for weight-related health problems. You may be at higher risk for health problems if you are not active, you eat an unhealthy diet, or you drink too much alcohol or use tobacco products. Follow-up care is a key part of your treatment and safety. Be sure to make and go to all appointments, and call your doctor if you are having problems. It's also a good idea to know your test results and keep a list of the medicines you take. How can you care for yourself at home? · Practice healthy eating habits. This includes eating plenty of fruits, vegetables, whole grains, lean protein, and low-fat dairy. · If your doctor recommends it, get more exercise. Walking is a good choice. Bit by bit, increase the amount you walk every day. Try for at least 30 minutes on most days of the week. · Do not smoke. Smoking can increase your risk for health problems. If you need help quitting, talk to your doctor about stop-smoking programs and medicines. These can increase your chances of quitting for good. · Limit alcohol to 2 drinks a day for men and 1 drink a day for women. Too much alcohol can cause health problems. If you have a BMI higher than 25  · Your doctor may do other tests to check your risk for weight-related health problems. This may include measuring the distance around your waist. A waist measurement of more than 40 inches in men or 35 inches in women can increase the risk of weight-related health problems. · Talk with your doctor about steps you can take to stay healthy or improve your health. You may need to make lifestyle changes to lose weight and stay healthy, such as changing your diet and getting regular exercise. If you have a BMI lower than 18.5  · Your doctor may do other tests to check your risk for health problems. · Talk with your doctor about steps you can take to stay healthy or improve your health. You may need to make lifestyle changes to gain or maintain weight and stay healthy, such as getting more healthy foods in your diet and doing exercises to build muscle. Where can you learn more? Go to http://rickie-manjit.info/. Enter S176 in the search box to learn more about \"Body Mass Index: Care Instructions. \"  Current as of: January 23, 2017  Content Version: 11.2  © 0076-5907 [employer]. Care instructions adapted under license by [employer] (which disclaims liability or warranty for this information). If you have questions about a medical condition or this instruction, always ask your healthcare professional. [place] [address] any warranty or liability for your use of this information. High Blood Pressure: Care Instructions  Your Care Instructions  If your blood pressure is usually above 140/90, you have high blood pressure, or hypertension. That means the top number is 140 or higher or the bottom number is 90 or higher, or both.   Despite what a lot of people think, high blood pressure usually doesn't cause headaches or make you feel dizzy or lightheaded. It usually has no symptoms. But it does increase your risk for heart attack, stroke, and kidney or eye damage. The higher your blood pressure, the more your risk increases. Your doctor will give you a goal for your blood pressure. Your goal will be based on your health and your age. An example of a goal is to keep your blood pressure below 140/90. Lifestyle changes, such as eating healthy and being active, are always important to help lower blood pressure. You might also take medicine to reach your blood pressure goal.  Follow-up care is a key part of your treatment and safety. Be sure to make and go to all appointments, and call your doctor if you are having problems. It's also a good idea to know your test results and keep a list of the medicines you take. How can you care for yourself at home? Medical treatment  · If you stop taking your medicine, your blood pressure will go back up. You may take one or more types of medicine to lower your blood pressure. Be safe with medicines. Take your medicine exactly as prescribed. Call your doctor if you think you are having a problem with your medicine. · Talk to your doctor before you start taking aspirin every day. Aspirin can help certain people lower their risk of a heart attack or stroke. But taking aspirin isn't right for everyone, because it can cause serious bleeding. · See your doctor regularly. You may need to see the doctor more often at first or until your blood pressure comes down. · If you are taking blood pressure medicine, talk to your doctor before you take decongestants or anti-inflammatory medicine, such as ibuprofen. Some of these medicines can raise blood pressure. · Learn how to check your blood pressure at home. Lifestyle changes  · Stay at a healthy weight.  This is especially important if you put on weight around the waist. Losing even 10 pounds can help you lower your blood pressure. · If your doctor recommends it, get more exercise. Walking is a good choice. Bit by bit, increase the amount you walk every day. Try for at least 30 minutes on most days of the week. You also may want to swim, bike, or do other activities. · Avoid or limit alcohol. Talk to your doctor about whether you can drink any alcohol. · Try to limit how much sodium you eat to less than 2,300 milligrams (mg) a day. Your doctor may ask you to try to eat less than 1,500 mg a day. · Eat plenty of fruits (such as bananas and oranges), vegetables, legumes, whole grains, and low-fat dairy products. · Lower the amount of saturated fat in your diet. Saturated fat is found in animal products such as milk, cheese, and meat. Limiting these foods may help you lose weight and also lower your risk for heart disease. · Do not smoke. Smoking increases your risk for heart attack and stroke. If you need help quitting, talk to your doctor about stop-smoking programs and medicines. These can increase your chances of quitting for good. When should you call for help? Call 911 anytime you think you may need emergency care. This may mean having symptoms that suggest that your blood pressure is causing a serious heart or blood vessel problem. Your blood pressure may be over 180/110. For example, call 911 if:  · You have symptoms of a heart attack. These may include:  ¨ Chest pain or pressure, or a strange feeling in the chest.  ¨ Sweating. ¨ Shortness of breath. ¨ Nausea or vomiting. ¨ Pain, pressure, or a strange feeling in the back, neck, jaw, or upper belly or in one or both shoulders or arms. ¨ Lightheadedness or sudden weakness. ¨ A fast or irregular heartbeat. · You have symptoms of a stroke. These may include:  ¨ Sudden numbness, tingling, weakness, or loss of movement in your face, arm, or leg, especially on only one side of your body. ¨ Sudden vision changes.   ¨ Sudden trouble speaking. ¨ Sudden confusion or trouble understanding simple statements. ¨ Sudden problems with walking or balance. ¨ A sudden, severe headache that is different from past headaches. · You have severe back or belly pain. Do not wait until your blood pressure comes down on its own. Get help right away. Call your doctor now or seek immediate care if:  · Your blood pressure is much higher than normal (such as 180/110 or higher), but you don't have symptoms. · You think high blood pressure is causing symptoms, such as:  ¨ Severe headache. ¨ Blurry vision. Watch closely for changes in your health, and be sure to contact your doctor if:  · Your blood pressure measures 140/90 or higher at least 2 times. That means the top number is 140 or higher or the bottom number is 90 or higher, or both. · You think you may be having side effects from your blood pressure medicine. · Your blood pressure is usually normal, but it goes above normal at least 2 times. Where can you learn more? Go to http://rickie-manjit.info/. Enter E497 in the search box to learn more about \"High Blood Pressure: Care Instructions. \"  Current as of: August 8, 2016  Content Version: 11.2  © 5153-5947 [employer]. Care instructions adapted under license by [employer] (which disclaims liability or warranty for this information). If you have questions about a medical condition or this instruction, always ask your healthcare professional. [place] [address] any warranty or liability for your use of this information.

## 2017-07-13 NOTE — MR AVS SNAPSHOT
Recommend levothyroxine 50mcg oral or via NG tube   Repeat tsh within 4 weeks as o/p     Visit Information Date & Time Provider Department Dept. Phone Encounter #  
 4/26/2017  1:00 PM Roma Mejias NP Internist of [address] [place] [address] Place [id number] Follow-up Instructions Return in about 2 weeks (around 5/10/2017), or if symptoms worsen or fail to improve. Your Appointments 5/8/2017 11:30 AM  
Office Visit with Roma Mejias NP Internist of [facility] ([facility]) Appt Note: ov bp check per [employer] [address], [address] [facility]  
  
   
 [address], [address]  
  
    
 7/20/2017  9:00 AM  
Nurse Visit with [address] [place] [address],[address]  
HR Metabolic Program ([facility]) Appt Note: orientation [place] , weight 307, height 5ft 4 HR Metabolic Program ([facility]) [phone] Upcoming Health Maintenance Date Due  
 PAP AKA CERVICAL CYTOLOGY 5/1/2015 INFLUENZA AGE 9 TO ADULT 8/1/2016 DTaP/Tdap/Td series (2 - Td) 11/1/2022 Allergies as of 4/26/2017  Review Complete On: 4/26/2017 By: Roma Mejias NP No Known Allergies Current Immunizations  Never Reviewed Name Date Tdap 11/1/2012 Not reviewed this visit You Were Diagnosed With   
  
 Codes Comments Essential hypertension    -  Primary ICD-10-CM: I10 
ICD-9-CM: 401.9 Obesity, morbid, BMI 50 or higher (HCC)     ICD-10-CM: E66.01 
ICD-9-CM: 278.01 Vitals BP Pulse Temp Resp Height(growth percentile) Weight(growth percentile) (!) 149/97 (BP 1 Location: Left arm, BP Patient Position: Sitting) 97 98 °F (36.7 °C) (Oral) 20 5' 4\" (1.626 m) 307 lb (139.3 kg) SpO2 BMI OB Status Smoking Status 100% 52.7 kg/m2 IUD Never Smoker Vitals History BMI and BSA Data Body Mass Index Body Surface Area 52.7 kg/m 2 2.51 m 2 Preferred Pharmacy Pharmacy Name Phone  [facility] [address] [phone] Your Updated Medication List  
  
   
This list is accurate as of: 4/26/17  1:45 PM.  Always use your most recent med list.  
  
  
  
  
 lisinopril 20 mg tablet Commonly known as:  PRINIVIL, ZESTRIL  
TAKE ONE TABLET BY MOUTH ONCE DAILY MIRENA 20 mcg/24 hr (5 years) IUD Generic drug:  levonorgestrel 1 Each by IntraUTERine route once. NIFEdipine ER 90 mg ER tablet Commonly known as:  PROCARDIA XL  
TAKE ONE TABLET BY MOUTH ONCE DAILY Prescriptions Sent to Pharmacy Refills NIFEdipine ER (PROCARDIA XL) 90 mg ER tablet 1 Sig: TAKE ONE TABLET BY MOUTH ONCE DAILY Class: Normal  
 Pharmacy: [address] Ph #: [phone]  
 lisinopril (PRINIVIL, ZESTRIL) 20 mg tablet 1 Sig: TAKE ONE TABLET BY MOUTH ONCE DAILY Class: Normal  
 Pharmacy: [address] Ph #: [phone] Follow-up Instructions Return in about 2 weeks (around 5/10/2017), or if symptoms worsen or fail to improve. To-Do List   
 04/27/2017 Lab:  CBC WITH AUTOMATED DIFF   
  
 04/27/2017 Lab:  HEMOGLOBIN A1C WITH EAG   
  
 04/27/2017 Lab:  LIPID PANEL   
  
 04/27/2017 Lab:  METABOLIC PANEL, COMPREHENSIVE   
  
 04/27/2017 Lab:  TSH 3RD GENERATION   
  
 04/27/2017 Lab:  URINALYSIS W/ RFLX MICROSCOPIC Patient Instructions Health Maintenance Due Topic Date Due  
 PAP AKA CERVICAL CYTOLOGY  05/01/2015  INFLUENZA AGE 9 TO ADULT  08/01/2016 Patient was given a copy of the Advanced Directive Form and understands to bring it in once completed. Call to schedule your well woman exam exam.  
 
 
  
High Blood Pressure: Care Instructions Your Care Instructions If your blood pressure is usually above 140/90, you have high blood pressure, or hypertension.  That means the top number is 140 or higher or the bottom number is 90 or higher, or both. Despite what a lot of people think, high blood pressure usually doesn't cause headaches or make you feel dizzy or lightheaded. It usually has no symptoms. But it does increase your risk for heart attack, stroke, and kidney or eye damage. The higher your blood pressure, the more your risk increases. Your doctor will give you a goal for your blood pressure. Your goal will be based on your health and your age. An example of a goal is to keep your blood pressure below 140/90. Lifestyle changes, such as eating healthy and being active, are always important to help lower blood pressure. You might also take medicine to reach your blood pressure goal. 
Follow-up care is a key part of your treatment and safety. Be sure to make and go to all appointments, and call your doctor if you are having problems. It's also a good idea to know your test results and keep a list of the medicines you take. How can you care for yourself at home? Medical treatment · If you stop taking your medicine, your blood pressure will go back up. You may take one or more types of medicine to lower your blood pressure. Be safe with medicines. Take your medicine exactly as prescribed. Call your doctor if you think you are having a problem with your medicine. · Talk to your doctor before you start taking aspirin every day. Aspirin can help certain people lower their risk of a heart attack or stroke. But taking aspirin isn't right for everyone, because it can cause serious bleeding. · See your doctor regularly. You may need to see the doctor more often at first or until your blood pressure comes down. · If you are taking blood pressure medicine, talk to your doctor before you take decongestants or anti-inflammatory medicine, such as ibuprofen. Some of these medicines can raise blood pressure. · Learn how to check your blood pressure at home. Lifestyle changes · Stay at a healthy weight. This is especially important if you put on weight around the waist. Losing even 10 pounds can help you lower your blood pressure. · If your doctor recommends it, get more exercise. Walking is a good choice. Bit by bit, increase the amount you walk every day. Try for at least 30 minutes on most days of the week. You also may want to swim, bike, or do other activities. · Avoid or limit alcohol. Talk to your doctor about whether you can drink any alcohol. · Try to limit how much sodium you eat to less than 2,300 milligrams (mg) a day. Your doctor may ask you to try to eat less than 1,500 mg a day. · Eat plenty of fruits (such as bananas and oranges), vegetables, legumes, whole grains, and low-fat dairy products. · Lower the amount of saturated fat in your diet. Saturated fat is found in animal products such as milk, cheese, and meat. Limiting these foods may help you lose weight and also lower your risk for heart disease. · Do not smoke. Smoking increases your risk for heart attack and stroke. If you need help quitting, talk to your doctor about stop-smoking programs and medicines. These can increase your chances of quitting for good. When should you call for help? Call 911 anytime you think you may need emergency care. This may mean having symptoms that suggest that your blood pressure is causing a serious heart or blood vessel problem. Your blood pressure may be over 180/110. For example, call 911 if: 
· You have symptoms of a heart attack. These may include: ¨ Chest pain or pressure, or a strange feeling in the chest. 
¨ Sweating. ¨ Shortness of breath. ¨ Nausea or vomiting. ¨ Pain, pressure, or a strange feeling in the back, neck, jaw, or upper belly or in one or both shoulders or arms. ¨ Lightheadedness or sudden weakness. ¨ A fast or irregular heartbeat. · You have symptoms of a stroke. These may include: ¨ Sudden numbness, tingling, weakness, or loss of movement in your face, arm, or leg, especially on only one side of your body. ¨ Sudden vision changes. ¨ Sudden trouble speaking. ¨ Sudden confusion or trouble understanding simple statements. ¨ Sudden problems with walking or balance. ¨ A sudden, severe headache that is different from past headaches. · You have severe back or belly pain. Do not wait until your blood pressure comes down on its own. Get help right away. Call your doctor now or seek immediate care if: 
· Your blood pressure is much higher than normal (such as 180/110 or higher), but you don't have symptoms. · You think high blood pressure is causing symptoms, such as: ¨ Severe headache. ¨ Blurry vision. Watch closely for changes in your health, and be sure to contact your doctor if: 
· Your blood pressure measures 140/90 or higher at least 2 times. That means the top number is 140 or higher or the bottom number is 90 or higher, or both. · You think you may be having side effects from your blood pressure medicine. · Your blood pressure is usually normal, but it goes above normal at least 2 times. Where can you learn more? Go to http://rickie-manjit.info/. Enter E403 in the search box to learn more about \"High Blood Pressure: Care Instructions. \" Current as of: August 8, 2016 Content Version: 11.2 © 7933-0766 [employer]ges. Care instructions adapted under license by A[employer]Wizz (which disclaims liability or warranty for this information). If you have questions about a medical condition or this instruction, always ask your healthcare professional. [place] [address] any warranty or liability for your use of this information. Body Mass Index: Care Instructions Your Care Instructions Body mass index (BMI) can help you see if your weight is raising your risk for health problems. It uses a formula to compare how much you weigh with how tall you are. · A BMI lower than 18.5 is considered underweight. · A BMI between 18.5 and 24.9 is considered healthy. · A BMI between 25 and 29.9 is considered overweight. A BMI of 30 or higher is considered obese. If your BMI is in the normal range, it means that you have a lower risk for weight-related health problems. If your BMI is in the overweight or obese range, you may be at increased risk for weight-related health problems, such as high blood pressure, heart disease, stroke, arthritis or joint pain, and diabetes. If your BMI is in the underweight range, you may be at increased risk for health problems such as fatigue, lower protection (immunity) against illness, muscle loss, bone loss, hair loss, and hormone problems. BMI is just one measure of your risk for weight-related health problems. You may be at higher risk for health problems if you are not active, you eat an unhealthy diet, or you drink too much alcohol or use tobacco products. Follow-up care is a key part of your treatment and safety. Be sure to make and go to all appointments, and call your doctor if you are having problems. It's also a good idea to know your test results and keep a list of the medicines you take. How can you care for yourself at home? · Practice healthy eating habits. This includes eating plenty of fruits, vegetables, whole grains, lean protein, and low-fat dairy. · If your doctor recommends it, get more exercise. Walking is a good choice. Bit by bit, increase the amount you walk every day. Try for at least 30 minutes on most days of the week. · Do not smoke. Smoking can increase your risk for health problems. If you need help quitting, talk to your doctor about stop-smoking programs and medicines. These can increase your chances of quitting for good. · Limit alcohol to 2 drinks a day for men and 1 drink a day for women.  Too much alcohol can cause health problems. If you have a BMI higher than 25 · Your doctor may do other tests to check your risk for weight-related health problems. This may include measuring the distance around your waist. A waist measurement of more than 40 inches in men or 35 inches in women can increase the risk of weight-related health problems. · Talk with your doctor about steps you can take to stay healthy or improve your health. You may need to make lifestyle changes to lose weight and stay healthy, such as changing your diet and getting regular exercise. If you have a BMI lower than 18.5 · Your doctor may do other tests to check your risk for health problems. · Talk with your doctor about steps you can take to stay healthy or improve your health. You may need to make lifestyle changes to gain or maintain weight and stay healthy, such as getting more healthy foods in your diet and doing exercises to build muscle. Where can you learn more? Go to http://rickie-manjit.info/. Enter S176 in the search box to learn more about \"Body Mass Index: Care Instructions. \" Current as of: January 23, 2017 Content Version: 11.2 © 6392-0520 [employer]ilex[employer]. Care instructions adapted under license by AltraVax (which disclaims liability or warranty for this information). If you have questions about a medical condition or this instruction, always ask your healthcare professional. [address] any warranty or liability for your use of this information. Learning About Dietary Guidelines What are the Dietary Guidelines for Americans? Dietary Guidelines for Americans provide tips for eating well and staying healthy. This helps reduce the risk for long-term (chronic) diseases. These adult guidelines from the [place] recommend that you: 
· Eat lots of fruits, vegetables, whole grains, and low-fat or nonfat dairy products. · Try to balance your eating with your activity. This helps you stay at a healthy weight. · Drink alcohol in moderation, if at all. · Limit foods high in salt, saturated fat, trans fat, and added sugar. What is MyPlate? MyPlate is the U.S. government's food guide. It can help you make your own well-balanced eating plan. A balanced eating plan means that you eat enough, but not too much, and that your food gives you the nutrients you need to stay healthy. MyPlate focuses on eating plenty of whole grains, fruits, and vegetables, and on limiting fat and sugar. It is available online at www. ChooseMyPlate.gov. How can you get started? MyPlate suggests that most adults eat certain amounts from the different food groups: 
Grains Eat 5 to 8 ounces of grains each day. Half of those should be whole grains. Choose whole-grain breads, cold and cooked cereals and grains, pasta (without creamy sauces), hard rolls, or low-fat or fat-free crackers. Vegetables Eat 2 to 3 cups of vegetables every day. They contain little if any fat. And they have lots of nutrients that help protect against heart disease. Fruits Eat 1½ to 2 cups of fruits every day. Fruits contain very little fat but lots of nutrients. Protein foods Most adults need 5 to 6½ ounces each day. Choose fish and lean poultry more often. Eat red meat and fried meats less often. Dried beans, tofu, and nuts are also good sources of protein. Dairy Most adults need 3 cups of milk and milk products a day. Choose low-fat or fat-free products from this food group. If you have problems digesting milk, try eating cheese or yogurt instead. Limit fats and oils, including those used in cooking. When you do use fats, choose oils that are liquid at room temperature (unsaturated fats). These include canola oil and olive oil. Avoid foods with trans fats, such as many fried foods, cookies, and snack foods. Where can you learn more? Go to http://rickie-manjit.info/. Enter A780 in the search box to learn more about \"Learning About Dietary Guidelines. \" Current as of: July 26, 2016 Content Version: 11.2 © 7972-8391 [employer]ed Pharm[employer]. Care instructions adapted under license by Verix (which disclaims liability or warranty for this information). If you have questions about a medical condition or this instruction, always ask your healthcare professional. [place] [address] any warranty or liability for your use of this information. Introducing [facility]! Dear Huey [place]: Thank you for requesting a [facility] account. Our records indicate that you already have an active [facility] account. You can access your account anytime at https://"[employer] Juju [employer].". Red e App/"[employer] Did you know that you can access your hospital and ER discharge instructions at any time in [facility]? You can also review all of your test results from your hospital stay or ER visit. Additional Information If you have questions, please visit the Frequently Asked Questions section of the [facility] website at https://[employer] Point Holdings/"[employer] Ju[employer]/. Remember, [facility] is NOT to be used for urgent needs. For medical emergencies, dial 911. Now available from your iPhone and Android! Please provide this summary of care documentation to your next provider. Your primary care clinician is listed as Milan Allen. If you have any questions after today's visit, please call [phone].

## 2017-07-21 ENCOUNTER — HOSPITAL ENCOUNTER (EMERGENCY)
Age: 32
Discharge: HOME | End: 2017-07-21
Attending: EMERGENCY MEDICINE
Payer: COMMERCIAL

## 2017-07-21 VITALS
BODY MASS INDEX: 50.02 KG/M2 | DIASTOLIC BLOOD PRESSURE: 96 MMHG | SYSTOLIC BLOOD PRESSURE: 149 MMHG | RESPIRATION RATE: 15 BRPM | HEART RATE: 87 BPM | TEMPERATURE: 98 F | WEIGHT: 293 LBS | HEIGHT: 64 IN | OXYGEN SATURATION: 98 %

## 2017-07-21 DIAGNOSIS — N39.0 URINARY TRACT INFECTION WITHOUT HEMATURIA, SITE UNSPECIFIED: Primary | ICD-10-CM

## 2017-07-21 LAB
APPEARANCE UR: CLEAR
BACTERIA URNS QL MICRO: ABNORMAL /HPF
BILIRUB UR QL: NEGATIVE
COLOR UR: YELLOW
EPITH CASTS URNS QL MICRO: ABNORMAL /LPF (ref 0–5)
GLUCOSE UR STRIP.AUTO-MCNC: NEGATIVE MG/DL
HCG UR QL: NEGATIVE
HGB UR QL STRIP: ABNORMAL
KETONES UR QL STRIP.AUTO: NEGATIVE MG/DL
LEUKOCYTE ESTERASE UR QL STRIP.AUTO: ABNORMAL
NITRITE UR QL STRIP.AUTO: NEGATIVE
PH UR STRIP: 6.5 [PH] (ref 5–8)
PROT UR STRIP-MCNC: 30 MG/DL
RBC #/AREA URNS HPF: ABNORMAL /HPF (ref 0–5)
SP GR UR REFRACTOMETRY: 1.01 (ref 1–1.03)
UROBILINOGEN UR QL STRIP.AUTO: 0.2 EU/DL (ref 0.2–1)
WBC URNS QL MICRO: ABNORMAL /HPF (ref 0–4)

## 2017-07-21 PROCEDURE — 99282 EMERGENCY DEPT VISIT SF MDM: CPT

## 2017-07-21 PROCEDURE — 81025 URINE PREGNANCY TEST: CPT | Performed by: PHYSICIAN ASSISTANT

## 2017-07-21 PROCEDURE — 81001 URINALYSIS AUTO W/SCOPE: CPT | Performed by: PHYSICIAN ASSISTANT

## 2017-07-21 RX ORDER — CEPHALEXIN 500 MG/1
500 CAPSULE ORAL 2 TIMES DAILY
Qty: 20 CAP | Refills: 0 | Status: SHIPPED | OUTPATIENT
Start: 2017-07-21 | End: 2017-07-21

## 2017-07-21 RX ORDER — CEPHALEXIN 500 MG/1
500 CAPSULE ORAL 2 TIMES DAILY
Qty: 20 CAP | Refills: 0 | Status: SHIPPED | OUTPATIENT
Start: 2017-07-21 | End: 2017-07-31

## 2017-07-21 NOTE — ED TRIAGE NOTES
Patient C/O right flank pain and nausea starting this morning, worse after lunch. Denies burning, pain, and frequent urination.

## 2017-07-21 NOTE — DISCHARGE INSTRUCTIONS
Urinary Tract Infection in Women: Care Instructions  Your Care Instructions    A urinary tract infection, or UTI, is a general term for an infection anywhere between the kidneys and the urethra (where urine comes out). Most UTIs are bladder infections. They often cause pain or burning when you urinate. UTIs are caused by bacteria and can be cured with antibiotics. Be sure to complete your treatment so that the infection goes away. Follow-up care is a key part of your treatment and safety. Be sure to make and go to all appointments, and call your doctor if you are having problems. It's also a good idea to know your test results and keep a list of the medicines you take. How can you care for yourself at home? · Take your antibiotics as directed. Do not stop taking them just because you feel better. You need to take the full course of antibiotics. · Drink extra water and other fluids for the next day or two. This may help wash out the bacteria that are causing the infection. (If you have kidney, heart, or liver disease and have to limit fluids, talk with your doctor before you increase your fluid intake.)  · Avoid drinks that are carbonated or have caffeine. They can irritate the bladder. · Urinate often. Try to empty your bladder each time. · To relieve pain, take a hot bath or lay a heating pad set on low over your lower belly or genital area. Never go to sleep with a heating pad in place. To prevent UTIs  · Drink plenty of water each day. This helps you urinate often, which clears bacteria from your system. (If you have kidney, heart, or liver disease and have to limit fluids, talk with your doctor before you increase your fluid intake.)  · Urinate when you need to. · Urinate right after you have sex. · Change sanitary pads often. · Avoid douches, bubble baths, feminine hygiene sprays, and other feminine hygiene products that have deodorants.   · After going to the bathroom, wipe from front to back.  When should you call for help? Call your doctor now or seek immediate medical care if:  · Symptoms such as fever, chills, nausea, or vomiting get worse or appear for the first time. · You have new pain in your back just below your rib cage. This is called flank pain. · There is new blood or pus in your urine. · You have any problems with your antibiotic medicine. Watch closely for changes in your health, and be sure to contact your doctor if:  · You are not getting better after taking an antibiotic for 2 days. · Your symptoms go away but then come back. Where can you learn more? Go to http://rickie-manjit.info/. Enter C907 in the search box to learn more about \"Urinary Tract Infection in Women: Care Instructions. \"  Current as of: November 28, 2016  Content Version: 11.3  © 4839-4080 Takeacoder, PicApp. Care instructions adapted under license by KnowFu (which disclaims liability or warranty for this information). If you have questions about a medical condition or this instruction, always ask your healthcare professional. [address] any warranty or liability for your use of this information.

## 2017-07-21 NOTE — ED PROVIDER NOTES
HPI     Past Medical History:   Diagnosis Date    HTN (hypertension) 1/27/2011    Pap smear 2012    total care for women. Past Surgical History:   Procedure Laterality Date    HX HEENT  2 months old    tear duct stenosis    HX UROLOGICAL      one kidney a little small, but OK with maturation         Family History:   Problem Relation Age of Onset    Hypertension Mother    [employer] Elevated Lipids Mother     Hypertension Maternal Grandmother     Elevated Lipids Maternal Grandmother     Stroke Maternal Grandmother     Hypertension Maternal Grandfather     Elevated Lipids Maternal Grandfather     Heart Disease Maternal Grandfather     Hypertension Paternal Grandmother     Elevated Lipids Paternal Grandmother     Hypertension Paternal Grandfather     Elevated Lipids Paternal Grandfather     Cancer Maternal Aunt      leukemia    Hypertension Paternal Aunt        Social History     Social History    Marital status: SINGLE     Spouse name: N/A    Number of children: N/A    Years of education: N/A     Occupational History    psr [employer]     Social History Main Topics    Smoking status: Never Smoker    Smokeless tobacco: Never Used    Alcohol use 0.0 oz/week     0 Standard drinks or equivalent per week      Comment: occasionally, socially    Drug use: Yes     Special: Prescription, OTC    Sexual activity: Yes     Partners: Male     Birth control/ protection: IUD     Other Topics Concern    Not on file     Social History Narrative         ALLERGIES: Review of patient's allergies indicates no known allergies.     Review of Systems    Vitals:    07/21/17 1553   BP: (!) 149/96   Pulse: 87   Resp: 15   Temp: 98 °F (36.7 °C)   SpO2: 98%   Weight: 140.6 kg (310 lb)   Height: 5' 4\" (1.626 m)            Physical Exam     MDM  Number of Diagnoses or Management Options  Urinary tract infection without hematuria, site unspecified:   Diagnosis management comments: Pt with right flank pain, has since resolved. With slight dysuria. Denies F/C, abdominal pain, NVD. IUD in place. No vaginal discharge or bleeding. Hx of frequent UTI's when younger therefore concerned she may have one now. PE unremarkable, vitals show elevated BP otherwise normal. UA with small blood and leuks. Rx for keflex.  pcp f.u        Amount and/or Complexity of Data Reviewed  Clinical lab tests: ordered and reviewed      ED Course       Procedures

## 2017-07-25 ENCOUNTER — CLINICAL SUPPORT (OUTPATIENT)
Dept: FAMILY MEDICINE CLINIC | Age: 32
End: 2017-07-25

## 2017-07-25 ENCOUNTER — OFFICE VISIT (OUTPATIENT)
Dept: INTERNAL MEDICINE CLINIC | Age: 32
End: 2017-07-25

## 2017-07-25 VITALS
DIASTOLIC BLOOD PRESSURE: 90 MMHG | SYSTOLIC BLOOD PRESSURE: 132 MMHG | OXYGEN SATURATION: 97 % | HEIGHT: 64 IN | WEIGHT: 293 LBS | RESPIRATION RATE: 20 BRPM | HEART RATE: 83 BPM | BODY MASS INDEX: 50.02 KG/M2 | TEMPERATURE: 98.7 F

## 2017-07-25 DIAGNOSIS — E66.9 OBESITY, UNSPECIFIED OBESITY SEVERITY, UNSPECIFIED OBESITY TYPE: Primary | ICD-10-CM

## 2017-07-25 DIAGNOSIS — I10 ESSENTIAL HYPERTENSION: Primary | ICD-10-CM

## 2017-07-25 DIAGNOSIS — N30.00 ACUTE CYSTITIS WITHOUT HEMATURIA: ICD-10-CM

## 2017-07-25 DIAGNOSIS — R73.03 PRE-DIABETES: ICD-10-CM

## 2017-07-25 DIAGNOSIS — I10 ESSENTIAL HYPERTENSION: ICD-10-CM

## 2017-07-25 NOTE — MR AVS SNAPSHOT
Visit Information Date & Time Provider Department Dept. Phone Encounter #  
 7/25/2017  8:30 AM Suzanne Elmore MD Internist of [address] [id number] Follow-up Instructions Return if symptoms worsen or fail to improve. Your Appointments 7/25/2017 10:00 AM  
Nurse Visit with [address] Metabolic Program ([facility]) Appt Note: orientation [place] , weight 307, height 5ft 4; LMOM 5.8.17 notifying of rescheduled date. [facility] Metabolic Program ([facility]) [phone]  
  
    
 11/8/2017  9:30 AM  
Office Visit with Suzanne Elmore MD  
Internist of [address] [place] [address]) Appt Note: ov 6mos. gaffney [address] [facility], [address] [place] Cara Sago [address]  
  
   
 [address], [address] Upcoming Health Maintenance Date Due  
 PAP AKA CERVICAL CYTOLOGY 5/1/2015 INFLUENZA AGE 9 TO ADULT 8/1/2017 DTaP/Tdap/Td series (2 - Td) 11/1/2022 Allergies as of 7/25/2017  Review Complete On: 7/25/2017 By: Afsaneh Prado No Known Allergies Current Immunizations  Never Reviewed Name Date Tdap 11/1/2012 Not reviewed this visit You Were Diagnosed With   
  
 Codes Comments Essential hypertension    -  Primary ICD-10-CM: I10 
ICD-9-CM: 401.9 Pre-diabetes     ICD-10-CM: R73.03 
ICD-9-CM: 790.29 Vitals BP Pulse Temp Resp Height(growth percentile) Weight(growth percentile) 132/90 (BP 1 Location: Left arm, BP Patient Position: Sitting) 83 98.7 °F (37.1 °C) (Oral) 20 5' 4\" (1.626 m) 312 lb 3.2 oz (141.6 kg) SpO2 BMI OB Status Smoking Status 97% 53.59 kg/m2 IUD Never Smoker BMI and BSA Data Body Mass Index Body Surface Area 53.59 kg/m 2 2.53 m 2 Preferred Pharmacy Pharmacy Name Phone [id number] - [place], [address] [phone] Your Updated Medication List  
  
   
This list is accurate as of: 7/25/17  8:59 AM.  Always use your most recent med list.  
  
  
  
  
 cephALEXin 500 mg capsule Commonly known as:  Marrion Silvan Take 1 Cap by mouth two (2) times a day for 10 days. lisinopril 20 mg tablet Commonly known as:  PRINIVIL, ZESTRIL  
TAKE ONE TABLET BY MOUTH ONCE DAILY MIRENA 20 mcg/24 hr (5 years) IUD Generic drug:  levonorgestrel 1 Each by IntraUTERine route once. NIFEdipine ER 90 mg ER tablet Commonly known as:  PROCARDIA XL  
TAKE ONE TABLET BY MOUTH ONCE DAILY Follow-up Instructions Return if symptoms worsen or fail to improve. To-Do List   
 Around 07/25/2017 Lab:  HEMOGLOBIN A1C W/O EAG Around 07/25/2017 Lab:  METABOLIC PANEL, BASIC   
  
 07/25/2017 Lab:  MICROALBUMIN, UR, RAND W/ MICROALBUMIN/CREA RATIO Patient Instructions Health Maintenance Due Topic Date Due  
 PAP AKA CERVICAL CYTOLOGY  05/01/2015 [facility]! Dear Alma Bolivar: Thank you for requesting a [employer] account. Our records indicate that you already have an active [employer] account. You can access your account anytime at [email or web address] Did you know that you can access your hospital and ER discharge instructions at any time in [employer]? You can also review all of your test results from your hospital stay or ER visit. Additional Information If you have questions, please visit the Frequently Asked Questions section of the [employer] website at [email or web address] Remember, [employer] is NOT to be used for urgent needs. For medical emergencies, dial 911. Now available from your iPhone and Android! Please provide this summary of care documentation to your next provider. Your primary care clinician is listed as Javier Barbosa.  If you have any questions after today's visit, please call [phone].

## 2017-07-25 NOTE — PROGRESS NOTES
INTERNISTS OF [facility] [place] [facility]:  7/25/2017, MRN: [id number]      Hebert Gonsalez is a 34 y.o. female and presents to clinic for Hospital Follow Up [facility] ER visit on 7-21-17 for a UTI)    Subjective: The patient is a 78-year-old female with history of obesity, prediabetes, and hypertension. 1.  Hypertension: This is a chronic condition, present over 6 months. The patient takes nifedipine and lisinopril. It has been over 1 year since she has had lab work to assess her renal function. She is about to start the medically supervised weight loss program with her orientation meeting today. Per the patient, she is scheduled for lab work per this program.  Her weight today is 312 pounds. She reports no adverse side effects from her medications. 2.  Prediabetes: This is a chronic condition, present over 6 months. It has been over 1 year since she has had an A1c checked to screen for diabetes. 3.  UTI:  The patient was diagnosed with a UTI earlier this month and emergency department visit. She is on Keflex. She states that she is symptomatically feeling better. Initial sx of right throbbing flank pain have improved with abx. No urinary sx. No fever/chills. No abdominal pain. No vaginal d/c or diarrhea. Patient Active Problem List    Diagnosis Date Noted    Obesity 12/24/2015    Pre-diabetes 12/24/2015    HTN (hypertension) 01/27/2011       Current Outpatient Prescriptions   Medication Sig Dispense Refill    cephALEXin (KEFLEX) 500 mg capsule Take 1 Cap by mouth two (2) times a day for 10 days. 20 Cap 0    NIFEdipine ER (PROCARDIA XL) 90 mg ER tablet TAKE ONE TABLET BY MOUTH ONCE DAILY 90 Tab 1    lisinopril (PRINIVIL, ZESTRIL) 20 mg tablet TAKE ONE TABLET BY MOUTH ONCE DAILY 90 Tab 1    levonorgestrel (MIRENA) 20 mcg/24 hr IUD 1 Each by IntraUTERine route once.            No Known Allergies    Past Medical History:   Diagnosis Date    HTN (hypertension) 1/27/2011    Pap smear 2012    total care for women. Past Surgical History:   Procedure Laterality Date    HX HEENT  2 months old    tear duct stenosis    HX UROLOGICAL      one kidney a little small, but OK with maturation       Family History   Problem Relation Age of Onset    Hypertension Mother    Rose Hollister Elevated Lipids Mother     Hypertension Maternal Grandmother     Elevated Lipids Maternal Grandmother     Stroke Maternal Grandmother     Hypertension Maternal Grandfather     Elevated Lipids Maternal Grandfather     Heart Disease Maternal Grandfather     Hypertension Paternal Grandmother     Elevated Lipids Paternal Grandmother     Hypertension Paternal Grandfather     Elevated Lipids Paternal Grandfather     Cancer Maternal Aunt      leukemia    Hypertension Paternal Aunt        Social History   Substance Use Topics    Smoking status: Never Smoker    Smokeless tobacco: Never Used    Alcohol use 0.0 oz/week     0 Standard drinks or equivalent per week      Comment: occasionally, socially       ROS   Review of Systems   Constitutional: Negative for chills and fever. HENT: Negative for ear pain and sore throat. Eyes: Negative for blurred vision and pain. Respiratory: Negative for cough and shortness of breath. Cardiovascular: Negative for chest pain. Gastrointestinal: Negative for abdominal pain, blood in stool and melena. Genitourinary: Positive for flank pain (none today). Negative for dysuria and hematuria. Musculoskeletal: Negative for joint pain and myalgias. Skin: Negative for rash. Neurological: Negative for tingling, focal weakness and headaches. Endo/Heme/Allergies: Does not bruise/bleed easily. Psychiatric/Behavioral: Negative for substance abuse.        Objective     Vitals:    07/25/17 0835   BP: 132/90   Pulse: 83   Resp: 20   Temp: 98.7 °F (37.1 °C)   TempSrc: Oral   SpO2: 97%   Weight: 312 lb 3.2 oz (141.6 kg)   Height: 5' 4\" (1.626 m)   PainSc:   0 - No pain       Physical Exam   Constitutional: She is oriented to person, place, and time and well-developed, well-nourished, and in no distress. HENT:   Head: Normocephalic and atraumatic. Right Ear: External ear normal.   Left Ear: External ear normal.   Nose: Nose normal.   Mouth/Throat: Oropharynx is clear and moist. No oropharyngeal exudate. Clear TMs   Eyes: Conjunctivae and EOM are normal. Pupils are equal, round, and reactive to light. Right eye exhibits no discharge. Left eye exhibits no discharge. No scleral icterus. Neck: Neck supple. Cardiovascular: Normal rate, regular rhythm, normal heart sounds and intact distal pulses. Exam reveals no gallop and no friction rub. No murmur heard. Pulmonary/Chest: Effort normal and breath sounds normal. No respiratory distress. She has no wheezes. She has no rales. Abdominal: Soft. Bowel sounds are normal. She exhibits no distension. There is no tenderness. There is no rebound and no guarding. No CVA tenderness to palpation   Musculoskeletal: She exhibits no edema or tenderness (BUE are NTTP). Lymphadenopathy:     She has no cervical adenopathy. Neurological: She is alert and oriented to person, place, and time. She exhibits normal muscle tone. Gait normal.   Skin: Skin is warm and dry. No erythema. Psychiatric: Affect normal.   Nursing note and vitals reviewed.       LABS   Data Review:     Lab Results   Component Value Date/Time    Sodium 139 04/19/2013 09:35 AM    Potassium 4.6 04/19/2013 09:35 AM    Chloride 105 04/19/2013 09:35 AM    CO2 30 04/19/2013 09:35 AM    Anion gap 4 04/19/2013 09:35 AM    Glucose 96 04/19/2013 09:35 AM    BUN 14 04/19/2013 09:35 AM    Creatinine 0.71 04/19/2013 09:35 AM    BUN/Creatinine ratio 20 04/19/2013 09:35 AM    GFR est AA >60 04/19/2013 09:35 AM    GFR est non-AA >60 04/19/2013 09:35 AM    Calcium 8.8 04/19/2013 09:35 AM       Lab Results   Component Value Date/Time    Cholesterol, total 154 02/21/2011 11:15 AM    HDL Cholesterol 47 02/21/2011 11:15 AM LDL, calculated 88 02/21/2011 11:15 AM    VLDL, calculated 19 02/21/2011 11:15 AM    Triglyceride 97 02/21/2011 11:15 AM       Lab Results   Component Value Date/Time    Hemoglobin A1c (POC) 5.8 12/24/2015 11:42 AM       Assessment/Plan:   1. Essential hypertension: Stable. Continue with nifedipine and lisinopril. I will monitor her blood pressure while she is in the medically supervised weight loss program.  She will follow-up with me in November. As she loses weight, I expect her blood pressure to come down and as a result the patient will likely need less medication. I discussed the patient should have a urine protein screen, A1c, and a BMP. Since she is due for lab work per the medically supervised weight loss program, I will allow her to get labs with this program and wait for the results to be sent to me for review. ORDERS:  - MICROALBUMIN, UR, RAND W/ MICROALBUMIN/CREA RATIO; Future  - HEMOGLOBIN A1C W/O EAG; Future  - METABOLIC PANEL, BASIC; Future    2. Pre-diabetes:   The patient is overdue for an A1c and a BMP. I encouraged her to get the labs ordered in the medically supervised weight loss program.  I will review the results once they are sent to me. ORDERS:  - HEMOGLOBIN A1C W/O EAG; Future  - METABOLIC PANEL, BASIC; Future    3. UTI: Clinically improving s/p Keflex. I encouraged patient to complete the Keflex course. Health Maintenance Due   Topic Date Due    PAP AKA CERVICAL CYTOLOGY  05/01/2015     Lab review: labs are reviewed in the EHR    I have discussed the diagnosis with the patient and the intended plan as seen in the above orders. The patient has received an after-visit summary and questions were answered concerning future plans. I have discussed medication side effects and warnings with the patient as well. I have reviewed the plan of care with the patient, accepted their input and they are in agreement with the treatment goals. All questions were answered.  The patient understands the plan of care. Handouts provided today with above information. Pt instructed if symptoms worsen to call the office or report to the ED for continued care. Greater than 50% of the visit time was spent in counseling and/or coordination of care. Follow-up Disposition:  Return if symptoms worsen or fail to improve.     Jonas Bowen MD

## 2017-07-25 NOTE — PROGRESS NOTES
Chief Complaint   Patient presents with   [facility] ER visit on 7-21-17 for a UTI     1. Have you been to the ER, urgent care clinic since your last visit? Hospitalized since your last visit? Yes When: 7-21-17 Where: [facility] ER Reason: seen for a Urinary Tract Infection    2. Have you seen or consulted any other health care providers outside of the [facility] since your last visit? Include any pap smears or colon screening.  No

## 2017-07-26 NOTE — PROGRESS NOTES
Patient attended a Medically Supervised Weight Loss New Patient Orientation today where we discussed:  - New Direction Very Low Calorie Diet details  - Medical Supervision  - Nutrition education  - Cost  - Policies and compliance required for program enrollment. - Lab slip was given to patient with instructions for having them drawn. Patients initial consultation with physician is tentatively scheduled for:  Future Appointments  Date Time Provider Georges Novoai   8/17/2017 8:00 AM Tarah Espinoza NP [address]   11/8/2017 9:30 AM Beau Holliday MD [address] [facility] [address] [facility] [address] starting weight was documented as: There were no vitals taken for this visit. The following patient answers were pulled from Weight Loss Questionnaire regarding weight related illness: (refer to media section for full weight loss history)  Hypertension (high blood pressure)  yes   High cholesterol no   Hypothyroidism no   Obstructive sleep apnea no   Gout no   Arthritis no ;    Heart Attack in the last 3 months: no     Type I Diabetes no   Type II Diabetes   no           [facility]  Metabolic   [address] [facility]  Medically Supervised [facility] Loss Program  [address] [facility] [address]. [address]  [phone]  office  [phone]  Fax  [email or web address]  [email or web address]

## 2017-08-04 ENCOUNTER — HOSPITAL ENCOUNTER (OUTPATIENT)
Dept: LAB | Age: 32
Discharge: HOME | End: 2017-08-04

## 2017-08-04 LAB — SENTARA SPECIMEN COL,SENBCF: NORMAL

## 2017-08-04 PROCEDURE — 99001 SPECIMEN HANDLING PT-LAB: CPT | Performed by: INTERNAL MEDICINE

## 2017-08-05 LAB
ANION GAP SERPL CALC-SCNC: 13 MMOL/L
AVG GLU, 10930: 116 MG/DL (ref 91–123)
BUN SERPL-MCNC: 14 MG/DL (ref 6–22)
CALCIUM SERPL-MCNC: 8.8 MG/DL (ref 8.4–10.5)
CHLORIDE SERPL-SCNC: 102 MMOL/L (ref 98–110)
CO2 SERPL-SCNC: 25 MMOL/L (ref 20–32)
CREAT SERPL-MCNC: 0.8 MG/DL (ref 0.5–1.2)
CREATININE, URINE: 82 MG/DL
GFRAA, 66117: >60
GFRNA, 66118: >60
GLUCOSE SERPL-MCNC: 99 MG/DL (ref 65–99)
HBA1C MFR BLD HPLC: 5.7 % (ref 4.8–5.9)
MICROALB/CREAT RATIO, 140286: NORMAL MCG/MG OF CREATININE (ref 0–30)
MICROALBUMIN,URINE RANDOM 140054: <12 UG/ML (ref 0.1–17)
POTASSIUM SERPL-SCNC: 5.4 MMOL/L (ref 3.5–5.5)
SODIUM SERPL-SCNC: 140 MMOL/L (ref 133–145)

## 2017-08-17 ENCOUNTER — OFFICE VISIT (OUTPATIENT)
Dept: INTERNAL MEDICINE CLINIC | Age: 32
End: 2017-08-17

## 2017-08-17 VITALS
RESPIRATION RATE: 12 BRPM | HEIGHT: 64 IN | BODY MASS INDEX: 50.02 KG/M2 | OXYGEN SATURATION: 98 % | DIASTOLIC BLOOD PRESSURE: 88 MMHG | TEMPERATURE: 98 F | WEIGHT: 293 LBS | SYSTOLIC BLOOD PRESSURE: 139 MMHG | HEART RATE: 86 BPM

## 2017-08-17 DIAGNOSIS — I10 ESSENTIAL HYPERTENSION: ICD-10-CM

## 2017-08-17 DIAGNOSIS — R73.03 PRE-DIABETES: ICD-10-CM

## 2017-08-17 DIAGNOSIS — E66.01 MORBID OBESITY WITH BMI OF 50.0-59.9, ADULT (HCC): Primary | ICD-10-CM

## 2017-08-17 NOTE — PROGRESS NOTES
Chief Complaint   Patient presents with    Weight Management     Consult     1. Have you been to the ER, urgent care clinic since your last visit? Hospitalized since your last visit? Yes When: 7-21-17 Where: [facility] ER Reason: UTI    2. Have you seen or consulted any other health care providers outside of the [address] [place] [address] since your last visit? Include any pap smears or colon screening.  No

## 2017-08-17 NOTE — MR AVS SNAPSHOT
Visit Information Date & Time Provider Department Dept. Phone Encounter #  
 8/17/2017  8:00 AM Nabeel Moscoso NP Internist of [address] [place] [address] Place [id number] Follow-up Instructions Return in about 4 weeks (around 9/14/2017). Your Appointments 9/14/2017  5:36 AM  
METABOLIC PROGRAM 30 with Nabeel Moscoso NP Internist of [place] (Lemond Boeck) Appt Note: metabolic program riddhi [address], [address] [facility] [address] [place]  
  
   
 [address], [address]  
  
    
 11/8/2017  9:30 AM  
Office Visit with Colleen Brown MD  
Internist of [address] Lemond Boeck) Appt Note: ov 6mos. gaffney [address], [address] [facility] [id number] [address] [place]  
  
   
 [address], [address] Upcoming Health Maintenance Date Due  
 PAP AKA CERVICAL CYTOLOGY 5/1/2015 INFLUENZA AGE 9 TO ADULT 8/1/2017 DTaP/Tdap/Td series (2 - Td) 11/1/2022 Allergies as of 8/17/2017  Review Complete On: 8/17/2017 By: Ramesh Gandhi No Known Allergies Current Immunizations  Never Reviewed Name Date Tdap 11/1/2012 Not reviewed this visit You Were Diagnosed With   
  
 Codes Comments Morbid obesity with BMI of 50.0-59.9, adult ([facility]    -  Primary ICD-10-CM: E66.01, Z68.43 
ICD-9-CM: 278.01, V85.43 Essential hypertension     ICD-10-CM: I10 
ICD-9-CM: 401.9 Pre-diabetes     ICD-10-CM: R73.03 
ICD-9-CM: 790.29 Vitals BP Pulse Temp Resp Height(growth percentile) Weight(growth percentile) 139/88 (BP 1 Location: Left arm, BP Patient Position: Sitting) 86 98 °F (36.7 °C) (Oral) 12 5' 4\" (1.626 m) 307 lb 3.2 oz (139.3 kg) SpO2 BMI OB Status Smoking Status 98% 52.73 kg/m2 IUD Never Smoker BMI and BSA Data Body Mass Index Body Surface Area 52.73 kg/m 2 2.51 m 2 Preferred Pharmacy Pharmacy Name Phone [facility] [address] [phone] Your Updated Medication List  
  
   
This list is accurate as of: 8/17/17  9:04 AM.  Always use your most recent med list.  
  
  
  
  
 lisinopril 20 mg tablet Commonly known as:  PRINIVIL, ZESTRIL  
TAKE ONE TABLET BY MOUTH ONCE DAILY MIRENA 20 mcg/24 hr (5 years) IUD Generic drug:  levonorgestrel 1 Each by IntraUTERine route once. NIFEdipine ER 90 mg ER tablet Commonly known as:  PROCARDIA XL  
TAKE ONE TABLET BY MOUTH ONCE DAILY We Performed the Following AMB POC EKG ROUTINE W/ 12 LEADS, INTER & REP [80375 CPT(R)] Follow-up Instructions Return in about 4 weeks (around 9/14/2017). To-Do List   
 08/17/2017 Lab:  CBC WITH AUTOMATED DIFF   
  
 08/17/2017 Lab:  LIPID PANEL   
  
 08/17/2017 Lab:  TSH 3RD GENERATION Patient Instructions Health Maintenance Due Topic Date Due  
 PAP AKA CERVICAL CYTOLOGY  05/01/2015  INFLUENZA AGE 9 TO ADULT  08/01/2017 Monthly goal: 
 
15 lbs weight loss 4 meal replacements a day. No other food. First one within about 1 hour of waking up to get metabolism started. Don't go more than 6 hours between meals. No more than 1 soup a day- too much salt No more than 1 bar a day- not enough protein.  
  
10 carbs extra a day. Compliance 
  
We do not expect perfection. However, we do ask for your persistence and your willingness to do the work of growing yourself.  
  
You will hit plateaus in your weight loss. You will run into situations that test your will power. Loyce Rolls will encounter times when you feel frustrated. It's OK if you slip up from time to time. However, how your respond to your slip ups and, the adjustments you make to prevent future slip ups will determine you long-term success. 
  
If you find yourself temporarily slipping in the program, it's OK.  We will gently nudge you forward and encourage you to do the work of identifying and moving beyond your stuck areas. However, if you find yourself wavering in the program for a prolonged time (more than 3 or 4 months) we will ask that you take a break from the program. At that time you will 3 options:  
  
1. Work with our weight loss specialist LOLA Arias to explore additional weight loss options.  
  
2. Work with a counselor to do some focused work in order to identify and break the patterns that are holding you back.  
  
3. The combination of both these. 
  
Once you, your counselor and/or [place] feel that you have moved past those patterns and want to give the program another chance, we will gladly work with you to determine if you're ready to start back in the program. 
  
When returning after a break, if it's been less than 3 months, you do not need to repeat an orientation, labs or an EKG. If it's over been 3 months you will required to repeat an orientation and, if greater than 6 months, you will be required to repeat an orientation, labs and an EKG.   
  
  
Additional Tips 
  
- Consume your 4 daily meal replacements equally spaced over the day No more than 6 hours between each meal 
Breakfast is especially important 
  
- Get the support of family and friends 
  
- Snack-proof your house 
  
- Have strategies for social situations, meetings that run over or vacation 
  
  
- When you fall off the plan it's no big deal; just start right back. Turn those days into examples of what to change or avoid next time.  
  
  
 
Homework for [employer] 
 
Exercise Exercise daily  
- Start slow, gradually increase your time by around 10 to 20 % a week - To prevent injury, take a recovery week every 4 weeks (reduce your exercise time and intensity by 1/2 during this time) - Your goal is to work up to 150 min a week; hard enough that you can't whistle or sing. It may take 6 months to work up to this. - Call the Health  at [facility] labs for exercise ideas ([phone]) Diet Common Side Effects 
 
-Constipation 
-Fatigue 
-Hunger 
-Low sodium 
-Hair Loss 1. Constipation  
     -around 1 out of 5 chance it happens at all 
     -around 1 out of 10 chance you'll need to take something (see below) It can be prevented by Drinking at least 8 glasses of water a day If you're prone to constipation: - Take a Stool Softener every day:  (eg - Dulcolax Stool Softener 100 mg or Miralax) - Eat Plenty of Fiber Get the New Direction products with fiber added Keep your fiber intake to at least 20 grams a day Use SUGAR-FREE products: Fiber One products, Benefiber or Metamucil If you get constipated: 1. Start with a Stool Softener (produces a bowel movement in 72 hr): 
 Examples: 
  Miralax 1 capful twice a day (It's OK to go up to 3 caps for a few days) Dulcolax Stool Softener 100 mg 
 
2. If you still have constipation after 2 or 3 days, add a Stimulant Laxative to the Stool Softener (this will produce a bowel movement in 6 - 12 hr): 
 Examples: 
  Exlax (1 small square) for up to 4 days Dulcolax stimulant laxative Magnesium citrate pill 500 mg start with once a day and go up to 3 times a day if needed 3. Or you can go straight to a combination Stool Softner / Stimulant at night. If you need, you can add a morning dose. Examples: 
  Pericolace 50 mg / 8.25 mg Sennakot-S  50 mg / 8.25 mg 
 
4. If You're Really locked up, get Liquid Magnesium Citrate (take according to the      directions) 2. Fatigue 
     -Everyone goes through this stage More common in the first 2 weeks It's your body adjusting to the Ketogenic diet and it's normal  
 
 
3. Hunger More common in the first few days After your body adjusts to the Ketogenic diet it will go away 4.  Low blood levels of sodium 
     -Not very common, especially if you're not taking a fluid pil If you develop a headache, feel fatigued, light-headed or dizzy Add 1/2 cup of bouillon broth every day 5. Hair loss 
     -This is fairly uncommon; you may see more than a usual amount of hair in your brush or the shower drain This can happen with physical stress (surgery, trauma or calorie restriction) or psychological stress. Although is it very concerning, it is often temporary and will resolve within 3 months. Some people notice a benefit from taking a daily dose of Biotin 2,500 mcg and increasing their Fish Oil intake. Other Tips and Helpful Information - Get the following books (all found on [address]) Change Anything by Christianne Leventhal, Rony Arredondo, and Benitez Drew Mindless Eating by Jackson Gandhi Perfectly Yourself by Westly Stitzer Me, Myself and I - 28 Days to Self-Love by Vladimir Barajas - Consume your 4 daily meal replacements equally spaced over the    day No more than 6 hours between each meal 
 Breakfast is especially important - Get the support of family and friends 
 
- Snack-proof your house - Have strategies for social situations, meetings that run over or vacation - When you fall off the plan it's no big deal; just start right back; turn those days into examples of what to avoid next time. Long-term Healthy Weight / Body Fat Different ways to determining your ideal weight: 
1. Keep your waist to less than 1/2 of your height 2. Keep your % body fat to under 30% for female and under 20% if male 3. Keep your BMI around 25 Supplements 1. Vitacost Synergy Basic Multi-Vitamin (Their In-House Brand) Take 1 capsule twice a day Found ONLY at [facility], NOT at [facility], [facility], the Vitamin Shoppe, etc 
    SKU #: [id number]  
    $16.49   / 1 month supply 
    www. VitaCost.com  [phone] 2.  N-Acetyl Cysteine (NAC) -- 600 mg 
     1 pill once a day Found at many stores but [address] has a good price: 
     Item #: NSI [id number]  $9.99 / 120 pills (4 month supply) 3. Turmeric with Black Pepper Extract (or Bioperine) 500 mg 
    1 pill 1-2 times a day (more is joint pain or increased inflammation) Found at many stores but [address] has a good price: 
    SKU #: [id number]  $13.64 / 60 pills 4. Fish Oil Take 1 capsule a day [facility] [employer] Brand: \"Omega 3 Fish Oil (per pill:  )\" OR 
 
Take 1 Tbsp 3 days a week of any of the following: 
 
[facility] Brand: Lemon or Orange flavored Fish Oil Nutra Sea + D:  Apple flavored Nutra Sea:  Vito flavored (These are found at most Vitamin Stores or on [employer]) FYI:  
Typical fish oil per pill =  mg and  mg 
Krill oil per pill = EPA 50 - 70 mg and DHA 27 - 250 mg 
 
 
^^^^^^^^^^^^^^^^^^^^^^^^^^^^^^^^^^^^^^^^^^^^^^^^^^^^^^^^^^^^^^^^^^^^^^^^^^^^^^^^^^^^^^^^^^^^^^^ Carbohydrates If you do not metabolize carbohydrates well, you will lose weight and keep the weigh off by keeping your carbohydrate intake low. How do you know if you do not metabolize carbs well? If your Triglycerides, sdLCL-C and Insulin Resistance are elevated, then you will do well to follow a low carb diet. Fun Facts About Carbs - The Typical American Diet = 450 grams a day - The Reducing Phase of the VLCD = 40 grams a day - Target for weight loss maintenance: 
 - Eat no more than 30 grams per meal 
 - Eat no more than 10 grams per snack (mid-morning and mid-afternoon snack) Resources for finding out where carbs hide in our foods: 
1. Our Registered Dietitians 2. Www. Atkins. com/tools 3. Read food labels 4. Books - The Calorie Kenny Wolf      - The New Atkins Diet for a New You 
 - Deisi Zaidi's NEW Carb and Calorie 4th Edition (my favorite) 5. Smart phone and Internet-based apps - MyFitjose fPal  
     - Carb Manager If you want to get a better picture of your cardiac risk, consider getting a coronary calcium score:  Call [phone] to schedule one. Introducing [facility]! Dear Jacqui Velasco: Thank you for requesting a [email or web address] account. Our records indicate that you already have an active [email or web address] account. You can access your account anytime at [email or web address]. [employer] Did you know that you can access your hospital and ER discharge instructions at any time in [email or web address]? You can also review all of your test results from your hospital stay or ER visit. Additional Information If you have questions, please visit the Frequently Asked Questions section of the [email or web address] website at [email or web address]. [employer]/. Remember, [email or web address] is NOT to be used for urgent needs. For medical emergencies, dial 911. Now available from your iPhone and Android! Please provide this summary of care documentation to your next provider. Your primary care clinician is listed as Laurel Mota. If you have any questions after today's visit, please call [phone].

## 2017-08-17 NOTE — PROGRESS NOTES
VLCD Progress Note: Initial Physician Visit    Thais Patel is a 34 y.o. female who is here for medical screening for the VLCD Program.      Weight History  Current weight 307.2 lb Body mass index is 52.73 kg/(m^2). Goal weight 180 lb  Highest weight 307.2 lb, at 34years old    Weight loss History  How many weight loss attempts have you had? 3  Which program were you most successful at? [facility] in January    Significant Medical History  MI w/ in the last 3 months: no  Type I Diabetes: no  Type II Diabetes: no  Liver or Kidney disease requiring protein restriction: no  Pregnant or planning on becoming pregnant w/in 6 months: no  Recent treatment for Cancer: no  History of Uric-acid Kidney Stone or elevated Uric Acid: no  Peptic ulcer disease not well controlled: no  Recent onset of Inflammatory Bowel Disease: no    If female:  No LMP recorded. Patient is not currently having periods (Reason: IUD).     Significant Psychosocial History  Major lifestyle changes: no   Other commitments: no   Any potential unsupportive: no     Current psychotherapy: no  Ever hospitalized for psychiatric reasons: no  History of depression: no  History of suicidal ideation: no  Dramatic mood changes during dieting: no  History of binge eating: no  If yes, is there a history of purging: no    Social History  Social History   Substance Use Topics    Smoking status: Never Smoker    Smokeless tobacco: Never Used    Alcohol use 0.0 oz/week     0 Standard drinks or equivalent per week      Comment: occasionally, socially       Has Darene Showers ever been told by a physician not to exercise: no    Does Darene Showers know of any reason they shouldn't exercise: no  If yes, why?       Does Darene Showers have any food allergies or sensitivities: no    Allergies include: No Known Allergies    Objective    Visit Vitals    /88 (BP 1 Location: Left arm, BP Patient Position: Sitting)    Pulse 86    Temp 98 °F (36.7 °C) (Oral)    Resp 12    Ht 5' 4\" (1.626 m)    Wt 307 lb 3.2 oz (139.3 kg)    SpO2 98%    BMI 52.73 kg/m2     Appearance: Obese, A&O x 3, NAD  HEENT:  NC/AT, PERRL  Neck:  No nodes, no JVD  Heart:  RRR without M/R/G  Lungs:  CTAB, no rhonchi, rales, or wheezes with good air exchange   Abdomen:  Non-tender, pos bowel sounds, no hepatosplenomegally  Ext:  No C/C/E      Labs    Most recent labs reviewed extensively with patient, will obtain new Lipid, cbc and thyroid today as pt is fasting this am, but ok for pt to start program.  Will continue with monthly CMP, uric acid checks    CBC: No results found for: WBC, RBC, HGB, HCT, PLT, HGBEXT, HCTEXT, PLTEXT  BMP:   Lab Results   Component Value Date/Time    Glucose 99 08/04/2017 11:08 PM    Sodium 140 08/04/2017 11:08 PM    Potassium 5.4 08/04/2017 11:08 PM    Chloride 102 08/04/2017 11:08 PM    CO2 25 08/04/2017 11:08 PM    BUN 14 08/04/2017 11:08 PM    Creatinine 0.8 08/04/2017 11:08 PM    Calcium 8.8 08/04/2017 11:08 PM      Lab Results   Component Value Date/Time    Hemoglobin A1c 5.7 08/04/2017 11:08 PM    Glucose 99 08/04/2017 11:08 PM    Microalbumin/Creat ratio (mg/g creat) 12 04/19/2013 09:37 AM    Microalb/Creat ratio (ug/mg creat.)  08/04/2017 11:08 PM      Comment:      ** Unable to calculate Microablumin/Creatinine Ratio due to low Microalbumin    Microalbumin,urine random 1.30 04/19/2013 09:37 AM    LDL, calculated 88 02/21/2011 11:15 AM    Creatinine 0.8 08/04/2017 11:08 PM      Lab Results   Component Value Date/Time    Cholesterol, total 154 02/21/2011 11:15 AM    HDL Cholesterol 47 02/21/2011 11:15 AM    LDL, calculated 88 02/21/2011 11:15 AM    LDL-C, External 91 12/19/2015 01:55 PM    Triglyceride 97 02/21/2011 11:15 AM     Lab Results   Component Value Date/Time    TSH 3.000 02/21/2011 11:15 AM                  Assessment/Plan    ICD-10-CM ICD-9-CM    1.  Morbid obesity with BMI of 50.0-59.9, adult (HCC) E66.01 278.01 AMB POC EKG ROUTINE W/ 12 LEADS, INTER & REP    Z68.43 V85.43 LIPID PANEL      CBC WITH AUTOMATED DIFF      TSH 3RD GENERATION   2. Essential hypertension I10 401.9    3.  Pre-diabetes R73.03 790.29        Diet regimen   # of meal replacements prescribed: 4   If modified LCD-nutritional guidelines:    Monthly Goal   15 lbs weight loss    Medical monitoring schedule:   Weekly BP/Weight checks   Monthly provider appointments

## 2017-08-17 NOTE — PATIENT INSTRUCTIONS
Health Maintenance Due   Topic Date Due    PAP AKA CERVICAL CYTOLOGY  05/01/2015    INFLUENZA AGE 9 TO ADULT  08/01/2017     Monthly goal:    15 lbs weight loss    4 meal replacements a day. No other food. First one within about 1 hour of waking up to get metabolism started. Don't go more than 6 hours between meals. No more than 1 soup a day- too much salt  No more than 1 bar a day- not enough protein.      10 carbs extra a day. Compliance     We do not expect perfection. However, we do ask for your persistence and your willingness to do the work of growing yourself.      You will hit plateaus in your weight loss. You will run into situations that test your will power. Stephen Ha will encounter times when you feel frustrated. It's OK if you slip up from time to time. However, how your respond to your slip ups and, the adjustments you make to prevent future slip ups will determine you long-term success.     If you find yourself temporarily slipping in the program, it's OK. We will gently nudge you forward and encourage you to do the work of identifying and moving beyond your stuck areas. However, if you find yourself wavering in the program for a prolonged time (more than 3 or 4 months) we will ask that you take a break from the program. At that time you will 3 options:      1. Work with our weight loss specialist LOLA Arias to explore additional weight loss options.      2. Work with a counselor to do some focused work in order to identify and break the patterns that are holding you back.      3. The combination of both these.     Once you, your counselor and/or [place] feel that you have moved past those patterns and want to give the program another chance, we will gladly work with you to determine if you're ready to start back in the program.     When returning after a break, if it's been less than 3 months, you do not need to repeat an orientation, labs or an EKG.  If it's over been 3 months you will required to repeat an orientation and, if greater than 6 months, you will be required to repeat an orientation, labs and an EKG.          Additional Tips     - Consume your 4 daily meal replacements equally spaced over the day  No more than 6 hours between each meal  Breakfast is especially important     - Get the support of family and friends     - Snack-proof your house     - Have strategies for social situations, meetings that run over or vacation        - When you fall off the plan it's no big deal; just start right back. Turn those days into examples of what to change or avoid next time.           Homework for [employer]    Exercise    Exercise daily   - Start slow, gradually increase your time by around 10 to 20 % a week    - To prevent injury, take a recovery week every 4 weeks (reduce your exercise time and intensity by 1/2 during this time)    - Your goal is to work up to 150 min a week; hard enough that you can't whistle or sing. It may take 6 months to work up to this. - Call the Health  at [facility] labs for exercise ideas ([phone])          Diet          Common Side Effects    -Constipation  -Fatigue  -Hunger  -Low sodium  -Hair Loss      1. Constipation        -around 1 out of 5 chance it happens at all       -around 1 out of 10 chance you'll need to take something (see below)    It can be prevented by Drinking at least 8 glasses of water a day    If you're prone to constipation:  - Take a Stool Softener every day:  (eg - Dulcolax Stool Softener 100 mg or Miralax)  - Eat Plenty of Fiber   Get the New Direction products with fiber added   Keep your fiber intake to at least 20 grams a day   Use SUGAR-FREE products: Fiber One products, Benefiber or Metamucil      If you get constipated:  1.  Start with a Stool Softener (produces a bowel movement in 72 hr):   Examples:    Miralax 1 capful twice a day (It's OK to go up to 3 caps for a few days)    Dulcolax Stool Softener 100 mg    2. If you still have constipation after 2 or 3 days, add a Stimulant Laxative to the Stool Softener (this will produce a bowel movement in 6 - 12 hr):   Examples:    Exlax (1 small square) for up to 4 days    Dulcolax stimulant laxative    Magnesium citrate pill 500 mg start with once a day and go up to 3 times a day if needed    3. Or you can go straight to a combination Stool Softner / Stimulant at night. If you need, you can add a morning dose. Examples:    Pericolace 50 mg / 8.25 mg    Sennakot-S  50 mg / 8.25 mg    4. If You're Really locked up, get Liquid Magnesium Citrate (take according to the      directions)      2. Fatigue       -Everyone goes through this stage  More common in the first 2 weeks  It's your body adjusting to the Ketogenic diet and it's normal       3. Hunger  More common in the first few days  After your body adjusts to the Ketogenic diet it will go away       4. Low blood levels of sodium       -Not very common, especially if you're not taking a fluid pil  If you develop a headache, feel fatigued, light-headed or dizzy  Add 1/2 cup of bouillon broth every day      5. Hair loss       -This is fairly uncommon; you may see more than a usual amount of hair in your brush or the shower drain  This can happen with physical stress (surgery, trauma or calorie restriction) or psychological stress. Although is it very concerning, it is often temporary and will resolve within 3 months. Some people notice a benefit from taking a daily dose of Biotin 2,500 mcg and increasing their Fish Oil intake.       Other Tips and Helpful Information    - Get the following books (all found on [address])    Change Anything by Marjorie Matias, Do Lujan, and Rashida Atkinson    Mindless Eating by Mary Vargas    Perfectly Yourself by Tricia Ibrahim    Me, Myself and I - 28 Days to Self-Love by Maura Sprague your 4 daily meal replacements equally spaced over the    day   No more than 6 hours between each meal   Breakfast is especially important    - Get the support of family and friends    - Snack-proof your house    - Have strategies for social situations, meetings that run over or vacation      - When you fall off the plan it's no big deal; just start right back; turn those days into examples of what to avoid next time. Long-term Healthy Weight / Body Fat  Different ways to determining your ideal weight:  1. Keep your waist to less than 1/2 of your height   2. Keep your % body fat to under 30% for female and under 20% if male  3. Keep your BMI around 25        Supplements      1. Vitacost Synergy Basic Multi-Vitamin (Their In-House Brand)      Take 1 capsule twice a day        Found ONLY at [facility], NOT at [facility], [facility], [facility], the Vitamin Shoppe, etc      SKU #: [id number]       $16.49   / 1 month supply      www. cartmi  [phone]       2. N-Acetyl Cysteine (NAC) -- 600 mg       1 pill once a day       Found at many stores but [employer] has a good price:       Item #: [id number]  $9.99 / 120 pills (4 month supply)      3. Turmeric with Black Pepper Extract (or Bioperine) 500 mg      1 pill 1-2 times a day (more is joint pain or increased inflammation)      Found at many stores but [employer] has a good price:      SKU #: [id number]  $13.64 / 60 pills        4.  Fish Oil      Take 1 capsule a day      V[employer] Brand: \"Omega 3 Fish Oil (per pill:  )\"                                                               OR    Take 1 Tbsp 3 days a week of any of the following:    V[employer]amin [employer] Brand: Lemon or Orange flavored Fish Oil   Nutra Sea + D:  Apple flavored   Nutra Sea:  Vito flavored   (These are found at most Vitamin Stores or on [employer])    FYI:   Typical fish oil per pill =  mg and  mg  Krill oil per pill = EPA 50 - 70 mg and DHA 27 - 250 mg      ^^^^^^^^^^^^^^^^^^^^^^^^^^^^^^^^^^^^^^^^^^^^^^^^^^^^^^^^^^^^^^^^^^^^^^^^^^^^^^^^^^^^^^^^^^^^^^^      Carbohydrates     If you do not metabolize carbohydrates well, you will lose weight and keep the weigh off by keeping your carbohydrate intake low. How do you know if you do not metabolize carbs well? If your Triglycerides, sdLCL-C and Insulin Resistance are elevated, then you will do well to follow a low carb diet. Fun Facts About Carbs    - The Typical American Diet = 450 grams a day    - The Reducing Phase of the VLCD = 40 grams a day    - Target for weight loss maintenance:   - Eat no more than 30 grams per meal   - Eat no more than 10 grams per snack (mid-morning and mid-afternoon snack)        Resources for finding out where carbs hide in our foods:  1. Our Registered Dietitians    2. Www. Atkins. com/tools    3. Read food labels    4. Books       - The Calorie FreeAgent       - The BankFacil System Diet for a New You       - Deisi Zaidi's NEW Carb and Calorie 4th Edition (my favorite)    5. Smart phone and Internet-based apps       - [employer] PharmaceuticalsFitOptiScan BiomedicalPal        - Carb Manager      If you want to get a better picture of your cardiac risk, consider getting a coronary calcium score:  Call [phone] to schedule one.

## 2017-08-23 ENCOUNTER — CLINICAL SUPPORT (OUTPATIENT)
Dept: FAMILY MEDICINE CLINIC | Age: 32
End: 2017-08-23

## 2017-08-23 VITALS
DIASTOLIC BLOOD PRESSURE: 85 MMHG | BODY MASS INDEX: 50.02 KG/M2 | HEIGHT: 64 IN | RESPIRATION RATE: 18 BRPM | WEIGHT: 293 LBS | SYSTOLIC BLOOD PRESSURE: 121 MMHG | HEART RATE: 108 BPM

## 2017-08-23 DIAGNOSIS — E66.01 MORBID OBESITY WITH BMI OF 50.0-59.9, ADULT (HCC): Primary | ICD-10-CM

## 2017-08-23 NOTE — PROGRESS NOTES
Progress Note: Weekly Education Class in the [facility] Weight Loss Program   Is there anything that you or the patient needs to let the [facility] Physician know about? no  Over the past week, have you experienced any side-effects? Yes, weakness, lightheadedness, and for the past 2 days felt really tired, weak, body aches. Patient was given the Common Side Effects Sheet    Clara Moscoso is a 34 y.o. female who is enrolled in [facility] Loss Program    Visit Vitals    /85 (BP 1 Location: Right arm, BP Patient Position: Sitting)    Pulse (!) 108    Resp 18    Ht 5' 4\" (1.626 m)    Wt 303 lb 6.4 oz (137.6 kg)    BMI 52.08 kg/m2     Weight Metrics 8/23/2017 8/17/2017 8/17/2017 7/25/2017 7/21/2017 5/8/2017 4/26/2017   Weight 303 lb 6.4 oz - 307 lb 3.2 oz 312 lb 3.2 oz 310 lb 306 lb 307 lb   Waist Measure Inches 58 57 - - - - -   Exercise Mins/week - 0 - - - - -   Body Fat % - 50 - - - - -   BMI 52.08 kg/m2 - 52.73 kg/m2 53.59 kg/m2 53.21 kg/m2 52.52 kg/m2 52.7 kg/m2         Have you received any other medical care this week? no  If yes, where and for what? Have you had any change in your medications since your last visit? no  If yes what? Did you have any problems adhering to the program last week? yes  If yes, please explain: not hungry and did not want all 4 products per day      Eating Habits Over Last Week:  Did you take in 64 oz of non-caloric fluids?  no     Did you consume your 4 meal replacements each day? no       Physical Activity Over the Past Week:    Aerobic exercise: 0 min  Resistance exercise: 0 workouts / week

## 2017-08-27 PROBLEM — E66.01 MORBID OBESITY WITH BMI OF 50.0-59.9, ADULT (HCC): Status: ACTIVE | Noted: 2017-08-27

## 2017-08-30 ENCOUNTER — CLINICAL SUPPORT (OUTPATIENT)
Dept: FAMILY MEDICINE CLINIC | Age: 32
End: 2017-08-30

## 2017-08-30 VITALS
BODY MASS INDEX: 50.02 KG/M2 | WEIGHT: 293 LBS | HEART RATE: 81 BPM | SYSTOLIC BLOOD PRESSURE: 134 MMHG | HEIGHT: 64 IN | DIASTOLIC BLOOD PRESSURE: 81 MMHG

## 2017-08-30 DIAGNOSIS — E66.01 MORBID OBESITY WITH BMI OF 50.0-59.9, ADULT (HCC): Primary | ICD-10-CM

## 2017-08-30 NOTE — PROGRESS NOTES
Progress Note: Weekly Education Class in the [facility] Weight Loss Program   Is there anything that you or the patient needs to let the [address] [facility] Physician know about? no  Over the past week, have you experienced any side-effects? no    Mary Jo Garibay is a 34 y.o. female who is enrolled in [facility] Loss Program    Visit Vitals    /81 (BP 1 Location: Right arm, BP Patient Position: Sitting)    Pulse 81    Ht 5' 4\" (1.626 m)    Wt 299 lb (135.6 kg)    BMI 51.32 kg/m2     Weight Metrics 8/30/2017 8/23/2017 8/17/2017 8/17/2017 7/25/2017 7/21/2017 5/8/2017   Weight 299 lb 303 lb 6.4 oz - 307 lb 3.2 oz 312 lb 3.2 oz 310 lb 306 lb   Waist Measure Inches 58 58 57 - - - -   Exercise Mins/week - - 0 - - - -   Body Fat % - - 50 - - - -   BMI 51.32 kg/m2 52.08 kg/m2 - 52.73 kg/m2 53.59 kg/m2 53.21 kg/m2 52.52 kg/m2         Have you received any other medical care this week? no  If yes, where and for what? Have you had any change in your medications since your last visit? no  If yes what? Did you have any problems adhering to the program last week? no  If yes, please explain:       Eating Habits Over Last Week:  Did you take in 64 oz of non-caloric fluids?  no     Did you consume your 4 meal replacements each day? no       Physical Activity Over the Past Week:    Aerobic exercise: 55 min  Resistance exercise: 0 workouts / week

## 2017-09-06 ENCOUNTER — CLINICAL SUPPORT (OUTPATIENT)
Dept: FAMILY MEDICINE CLINIC | Age: 32
End: 2017-09-06

## 2017-09-06 VITALS
WEIGHT: 293 LBS | DIASTOLIC BLOOD PRESSURE: 81 MMHG | HEART RATE: 81 BPM | BODY MASS INDEX: 50.02 KG/M2 | HEIGHT: 64 IN | SYSTOLIC BLOOD PRESSURE: 119 MMHG

## 2017-09-06 DIAGNOSIS — I10 ESSENTIAL HYPERTENSION: ICD-10-CM

## 2017-09-06 DIAGNOSIS — E66.01 MORBID OBESITY WITH BMI OF 50.0-59.9, ADULT (HCC): Primary | ICD-10-CM

## 2017-09-06 DIAGNOSIS — E66.01 OBESITY, MORBID, BMI 50 OR HIGHER (HCC): ICD-10-CM

## 2017-09-08 RX ORDER — NIFEDIPINE 90 MG/1
TABLET, EXTENDED RELEASE ORAL
Qty: 90 TAB | Refills: 1 | Status: SHIPPED | OUTPATIENT
Start: 2017-09-08 | End: 2018-07-04 | Stop reason: SDUPTHER

## 2017-09-08 RX ORDER — LISINOPRIL 20 MG/1
TABLET ORAL
Qty: 90 TAB | Refills: 1 | Status: SHIPPED | OUTPATIENT
Start: 2017-09-08 | End: 2018-07-04 | Stop reason: SDUPTHER

## 2017-09-14 ENCOUNTER — OFFICE VISIT (OUTPATIENT)
Dept: INTERNAL MEDICINE CLINIC | Age: 32
End: 2017-09-14

## 2017-09-14 VITALS
HEART RATE: 68 BPM | SYSTOLIC BLOOD PRESSURE: 128 MMHG | RESPIRATION RATE: 12 BRPM | WEIGHT: 288.5 LBS | TEMPERATURE: 98.1 F | HEIGHT: 64 IN | DIASTOLIC BLOOD PRESSURE: 87 MMHG | BODY MASS INDEX: 49.25 KG/M2 | OXYGEN SATURATION: 97 %

## 2017-09-14 DIAGNOSIS — I10 ESSENTIAL HYPERTENSION: ICD-10-CM

## 2017-09-14 DIAGNOSIS — E66.01 MORBID OBESITY DUE TO EXCESS CALORIES (HCC): ICD-10-CM

## 2017-09-14 DIAGNOSIS — R73.03 PRE-DIABETES: ICD-10-CM

## 2017-09-14 DIAGNOSIS — E66.01 MORBID OBESITY WITH BMI OF 50.0-59.9, ADULT (HCC): ICD-10-CM

## 2017-09-14 DIAGNOSIS — E66.01 MORBID OBESITY WITH BMI OF 50.0-59.9, ADULT (HCC): Primary | ICD-10-CM

## 2017-09-14 NOTE — MR AVS SNAPSHOT
Visit Information Date & Time Provider Department Dept. Phone Encounter #  
 9/14/2017  8:30 AM Sabrina Miller NP Internist of [address] [place] [address] [phone] Follow-up Instructions Return in about 4 weeks (around 10/12/2017). Your Appointments 11/8/2017  9:30 AM  
Office Visit with Dee Mallory MD  
Internist of [address] [place] [address] [facility]) Appt Note: ov 6mos. gaffney [address], [address] [place]  
  
   
 [address], [facility] Upcoming Health Maintenance Date Due  
 PAP AKA CERVICAL CYTOLOGY 5/1/2015 INFLUENZA AGE 9 TO ADULT 8/1/2017 DTaP/Tdap/Td series (2 - Td) 11/1/2022 Allergies as of 9/14/2017  Review Complete On: 9/14/2017 By: Sabrina Miller NP No Known Allergies Current Immunizations  Never Reviewed Name Date Tdap 11/1/2012 Not reviewed this visit You Were Diagnosed With   
  
 Codes Comments Morbid obesity with BMI of 50.0-59.9, adult ([facility]    -  Primary ICD-10-CM: E66.01, Z68.43 
ICD-9-CM: 278.01, V85.43 Pre-diabetes     ICD-10-CM: R73.03 
ICD-9-CM: 790.29 Essential hypertension     ICD-10-CM: I10 
ICD-9-CM: 401.9 Morbid obesity due to excess calories (HCC)     ICD-10-CM: E66.01 
ICD-9-CM: 278.01 Vitals BP Pulse Temp Resp Height(growth percentile) Weight(growth percentile) 128/87 (BP 1 Location: Left arm, BP Patient Position: Sitting) 68 98.1 °F (36.7 °C) (Oral) 12 5' 4\" (1.626 m) 288 lb 8 oz (130.9 kg) SpO2 BMI OB Status Smoking Status 97% 49.52 kg/m2 IUD Never Smoker Vitals History BMI and BSA Data Body Mass Index Body Surface Area  
 49.52 kg/m 2 2.43 m 2 Preferred Pharmacy Pharmacy Name Phone [facility] [address] [facility], [address] [place] [address] [phone] Your Updated Medication List  
  
   
 This list is accurate as of: 9/14/17  9:10 AM.  Always use your most recent med list.  
  
  
  
  
 lisinopril 20 mg tablet Commonly known as:  PRINIVIL, ZESTRIL  
TAKE ONE TABLET BY MOUTH ONCE DAILY MIRENA 20 mcg/24 hr (5 years) IUD Generic drug:  levonorgestrel 1 Each by IntraUTERine route once. NIFEdipine ER 90 mg ER tablet Commonly known as:  PROCARDIA XL  
TAKE ONE TABLET BY MOUTH ONCE DAILY Follow-up Instructions Return in about 4 weeks (around 10/12/2017). To-Do List   
 09/14/2017 Lab:  METABOLIC PANEL, COMPREHENSIVE   
  
 09/14/2017 Lab:  URIC ACID Patient Instructions Health Maintenance Due Topic Date Due  
 PAP AKA CERVICAL CYTOLOGY  05/01/2015  INFLUENZA AGE 9 TO ADULT  08/01/2017 Great job last month! Monthly goal: 
 
15-20 lbs weight loss Increase exercise - walk more. Body Mass Index: Care Instructions Your Care Instructions Body mass index (BMI) can help you see if your weight is raising your risk for health problems. It uses a formula to compare how much you weigh with how tall you are. · A BMI lower than 18.5 is considered underweight. · A BMI between 18.5 and 24.9 is considered healthy. · A BMI between 25 and 29.9 is considered overweight. A BMI of 30 or higher is considered obese. If your BMI is in the normal range, it means that you have a lower risk for weight-related health problems. If your BMI is in the overweight or obese range, you may be at increased risk for weight-related health problems, such as high blood pressure, heart disease, stroke, arthritis or joint pain, and diabetes. If your BMI is in the underweight range, you may be at increased risk for health problems such as fatigue, lower protection (immunity) against illness, muscle loss, bone loss, hair loss, and hormone problems. BMI is just one measure of your risk for weight-related health problems. You may be at higher risk for health problems if you are not active, you eat an unhealthy diet, or you drink too much alcohol or use tobacco products. Follow-up care is a key part of your treatment and safety. Be sure to make and go to all appointments, and call your doctor if you are having problems. It's also a good idea to know your test results and keep a list of the medicines you take. How can you care for yourself at home? · Practice healthy eating habits. This includes eating plenty of fruits, vegetables, whole grains, lean protein, and low-fat dairy. · If your doctor recommends it, get more exercise. Walking is a good choice. Bit by bit, increase the amount you walk every day. Try for at least 30 minutes on most days of the week. · Do not smoke. Smoking can increase your risk for health problems. If you need help quitting, talk to your doctor about stop-smoking programs and medicines. These can increase your chances of quitting for good. · Limit alcohol to 2 drinks a day for men and 1 drink a day for women. Too much alcohol can cause health problems. If you have a BMI higher than 25 · Your doctor may do other tests to check your risk for weight-related health problems. This may include measuring the distance around your waist. A waist measurement of more than 40 inches in men or 35 inches in women can increase the risk of weight-related health problems. · Talk with your doctor about steps you can take to stay healthy or improve your health. You may need to make lifestyle changes to lose weight and stay healthy, such as changing your diet and getting regular exercise. If you have a BMI lower than 18.5 · Your doctor may do other tests to check your risk for health problems. · Talk with your doctor about steps you can take to stay healthy or improve your health.  You may need to make lifestyle changes to gain or maintain weight and stay healthy, such as getting more healthy foods in your diet and doing exercises to build muscle. Where can you learn more? Go to http://rickie-manjit.info/. Enter S176 in the search box to learn more about \"Body Mass Index: Care Instructions. \" Current as of: January 23, 2017 Content Version: 11.3 © 6180-8737 [employer]. Care instructions adapted under license by [employer] (which disclaims liability or warranty for this information). If you have questions about a medical condition or this instruction, always ask your healthcare professional. [address] any warranty or liability for your use of this information. Introducing [facility]! Dear Freda Woods: Thank you for requesting a Viv[employer] account. Our records indicate that you already have an active Vivox account. You can access your account anytime at [email or web address] Did you know that you can access your hospital and ER discharge instructions at any time in Vivox? You can also review all of your test results from your hospital stay or ER visit. Additional Information If you have questions, please visit the Frequently Asked Questions section of the Vivox website at [email or web address] Remember, Vivox is NOT to be used for urgent needs. For medical emergencies, dial 911. Now available from your iPhone and Android! Please provide this summary of care documentation to your next provider. Your primary care clinician is listed as Kasia Hightower. If you have any questions after today's visit, please call [phone].

## 2017-09-14 NOTE — PROGRESS NOTES
[facility] Progress Note:   F/up Physician Visit    CC: Obesity      Yomi Sal is a 34 y.o. female who is here for her  f/up physician visit for the [facility] Program. Navya Murphy has completed 4 weeks of the program to date. 19 lbs weight loss since last visit!! She feels well, tolerating diet well, excited about how successful she is with program.    Starting weight 307.2 lb Body mass index is 52.73 kg/(m^2). Current weight: 288 lbs  Goal weight 180 lb     lbs, next due 257 lbs    Weight Metrics 9/14/2017 9/14/2017 9/6/2017 9/6/2017 8/30/2017 8/23/2017 8/17/2017   Weight - 288 lb 8 oz - 295 lb 12.8 oz 299 lb 303 lb 6.4 oz -   Waist Measure Inches 57 - 57 - 58 58 57   Exercise Mins/week 60 - - - - - 0   Body Fat % 48.7 - - - - - 50   BMI - 49.52 kg/m2 - 50.77 kg/m2 51.32 kg/m2 52.08 kg/m2 -         Current Outpatient Prescriptions   Medication Sig Dispense Refill    NIFEdipine ER (PROCARDIA XL) 90 mg ER tablet TAKE ONE TABLET BY MOUTH ONCE DAILY 90 Tab 1    lisinopril (PRINIVIL, ZESTRIL) 20 mg tablet TAKE ONE TABLET BY MOUTH ONCE DAILY 90 Tab 1    levonorgestrel (MIRENA) 20 mcg/24 hr IUD 1 Each by IntraUTERine route once. Participation   Did you attend clinic and class last week? yes    Review of Systems  Since your last visit, have you experienced any complications? no  If yes, please list:     No CP, SOB, palpitations, lightheadedness, dizziness, constipation. Positives highlight in BOLD. Are you taking an appetite suppressant? no  If so, is there any Chest Pain, Palpitations or Dizziness? BP Readings from Last 10 Encounters:   09/14/17 128/87   09/06/17 119/81   08/30/17 134/81   08/23/17 121/85   08/17/17 139/88   07/25/17 132/90   07/21/17 (!) 149/96   05/08/17 137/87   04/26/17 (!) 149/97   02/28/17 (!) 130/91         Have you received any other medical care this week? no  If yes, where and for what?        Have you discontinued or changed any medicine or dose of your medicine since your last visit? no  If yes, where and for what? Diet  How many ounces of calorie-free liquids did you consume each day?  80 oz-96    How many meal replacements did you take each day? 4    Did you have any problems adhering to the program?  no If yes, please explain:       Exercise  Aerobic exercise: 60 min  Resistance exercise: 0 workouts / week  Any discomfort?  no     If yes, where? Review of Systems  Complete ROS negative except where noted above    Objective  Visit Vitals    /87 (BP 1 Location: Left arm, BP Patient Position: Sitting)    Pulse 68    Temp 98.1 °F (36.7 °C) (Oral)    Resp 12    Ht 5' 4\" (1.626 m)    Wt 288 lb 8 oz (130.9 kg)    SpO2 97%    BMI 49.52 kg/m2     No LMP recorded. Patient is not currently having periods (Reason: IUD). Waist Circumference: I personally reviewed patient's Weight Management Doc Flowsheet  Neck Circumference: I personally reviewed patient's Weight Management Doc Flowsheet  Percent Body Fat: I personally reviewed patient's Weight Management Doc Flowsheet    Physical Exam  Appearance: well appearing, obese, A&O, NAD  HEENT:  NC/AT, PERRL, No scleral icterus  Heart:  RRR without M/R/G  Lungs:  CTAB, no rhonchi, rales, or wheezes with good air exchange   Ext:  No LE Edema    Assessment / Plan    Encounter Diagnoses   Name Primary?  Morbid obesity with BMI of 50.0-59.9, adult ([facility] [address] Yes    Pre-diabetes     Essential hypertension     Morbid obesity due to excess calories ([facility] [address]        1. Weight management well controlled, improved   Progress was reviewed with patient    2. Labs    Latest results reviewed with patient   Lab slip given to pt for f/up HDL labs    3.  Diet regimen   # of meal replacements prescribed: 4   If modified LCD-nutritional guidelines:    Monthly Goal   As below    Medical monitoring schedule:   Weekly BP/Weight checks   Monthly provider appointments          Patient Instructions     Health Maintenance Due   Topic Date Due    PAP AKA CERVICAL CYTOLOGY  05/01/2015    INFLUENZA AGE 9 TO ADULT  08/01/2017     Great job last month! Monthly goal:    15-20 lbs weight loss    Increase exercise - walk more. Body Mass Index: Care Instructions  Your Care Instructions    Body mass index (BMI) can help you see if your weight is raising your risk for health problems. It uses a formula to compare how much you weigh with how tall you are. · A BMI lower than 18.5 is considered underweight. · A BMI between 18.5 and 24.9 is considered healthy. · A BMI between 25 and 29.9 is considered overweight. A BMI of 30 or higher is considered obese. If your BMI is in the normal range, it means that you have a lower risk for weight-related health problems. If your BMI is in the overweight or obese range, you may be at increased risk for weight-related health problems, such as high blood pressure, heart disease, stroke, arthritis or joint pain, and diabetes. If your BMI is in the underweight range, you may be at increased risk for health problems such as fatigue, lower protection (immunity) against illness, muscle loss, bone loss, hair loss, and hormone problems. BMI is just one measure of your risk for weight-related health problems. You may be at higher risk for health problems if you are not active, you eat an unhealthy diet, or you drink too much alcohol or use tobacco products. Follow-up care is a key part of your treatment and safety. Be sure to make and go to all appointments, and call your doctor if you are having problems. It's also a good idea to know your test results and keep a list of the medicines you take. How can you care for yourself at home? · Practice healthy eating habits. This includes eating plenty of fruits, vegetables, whole grains, lean protein, and low-fat dairy. · If your doctor recommends it, get more exercise. Walking is a good choice. Bit by bit, increase the amount you walk every day.  Try for at least 30 minutes on most days of the week. · Do not smoke. Smoking can increase your risk for health problems. If you need help quitting, talk to your doctor about stop-smoking programs and medicines. These can increase your chances of quitting for good. · Limit alcohol to 2 drinks a day for men and 1 drink a day for women. Too much alcohol can cause health problems. If you have a BMI higher than 25  · Your doctor may do other tests to check your risk for weight-related health problems. This may include measuring the distance around your waist. A waist measurement of more than 40 inches in men or 35 inches in women can increase the risk of weight-related health problems. · Talk with your doctor about steps you can take to stay healthy or improve your health. You may need to make lifestyle changes to lose weight and stay healthy, such as changing your diet and getting regular exercise. If you have a BMI lower than 18.5  · Your doctor may do other tests to check your risk for health problems. · Talk with your doctor about steps you can take to stay healthy or improve your health. You may need to make lifestyle changes to gain or maintain weight and stay healthy, such as getting more healthy foods in your diet and doing exercises to build muscle. Where can you learn more? Go to http://rickie-manjit.info/. Enter S176 in the search box to learn more about \"Body Mass Index: Care Instructions. \"  Current as of: January 23, 2017  Content Version: 11.3  © 5968-0765 [employer]. Care instructions adapted under license by [employer] (which disclaims liability or warranty for this information). If you have questions about a medical condition or this instruction, always ask your healthcare professional. [place] [address] any warranty or liability for your use of this information.           Follow-up Disposition:  Return in about 4 weeks (around 10/12/2017). 10 minutes of the 15 minutes face to face time with Geovanna Castro consisted of counseling & coordinating and/or discussing treatment plans in reference to her The primary encounter diagnosis was Morbid obesity with BMI of 50.0-59.9, adult ([facility] [address] Diagnoses of Pre-diabetes, Essential hypertension, and Morbid obesity due to excess calories [facility]) were also pertinent to this visit. The patient is to follow up as scheduled and will report to the ED or the office if symptoms change or increase. The patient has voiced understanding and will comply.

## 2017-09-14 NOTE — PROGRESS NOTES
Chief Complaint   Patient presents with    Weight Management     1. Have you been to the ER, urgent care clinic since your last visit? Hospitalized since your last visit? No    2. Have you seen or consulted any other health care providers outside of the [address] [place] [address] since your last visit? Include any pap smears or colon screening.  No

## 2017-09-14 NOTE — PATIENT INSTRUCTIONS
Health Maintenance Due   Topic Date Due    PAP AKA CERVICAL CYTOLOGY  05/01/2015    INFLUENZA AGE 9 TO ADULT  08/01/2017     Great job last month! Monthly goal:    15-20 lbs weight loss    Increase exercise - walk more. Body Mass Index: Care Instructions  Your Care Instructions    Body mass index (BMI) can help you see if your weight is raising your risk for health problems. It uses a formula to compare how much you weigh with how tall you are. · A BMI lower than 18.5 is considered underweight. · A BMI between 18.5 and 24.9 is considered healthy. · A BMI between 25 and 29.9 is considered overweight. A BMI of 30 or higher is considered obese. If your BMI is in the normal range, it means that you have a lower risk for weight-related health problems. If your BMI is in the overweight or obese range, you may be at increased risk for weight-related health problems, such as high blood pressure, heart disease, stroke, arthritis or joint pain, and diabetes. If your BMI is in the underweight range, you may be at increased risk for health problems such as fatigue, lower protection (immunity) against illness, muscle loss, bone loss, hair loss, and hormone problems. BMI is just one measure of your risk for weight-related health problems. You may be at higher risk for health problems if you are not active, you eat an unhealthy diet, or you drink too much alcohol or use tobacco products. Follow-up care is a key part of your treatment and safety. Be sure to make and go to all appointments, and call your doctor if you are having problems. It's also a good idea to know your test results and keep a list of the medicines you take. How can you care for yourself at home? · Practice healthy eating habits. This includes eating plenty of fruits, vegetables, whole grains, lean protein, and low-fat dairy. · If your doctor recommends it, get more exercise. Walking is a good choice.  Bit by bit, increase the amount you walk every day. Try for at least 30 minutes on most days of the week. · Do not smoke. Smoking can increase your risk for health problems. If you need help quitting, talk to your doctor about stop-smoking programs and medicines. These can increase your chances of quitting for good. · Limit alcohol to 2 drinks a day for men and 1 drink a day for women. Too much alcohol can cause health problems. If you have a BMI higher than 25  · Your doctor may do other tests to check your risk for weight-related health problems. This may include measuring the distance around your waist. A waist measurement of more than 40 inches in men or 35 inches in women can increase the risk of weight-related health problems. · Talk with your doctor about steps you can take to stay healthy or improve your health. You may need to make lifestyle changes to lose weight and stay healthy, such as changing your diet and getting regular exercise. If you have a BMI lower than 18.5  · Your doctor may do other tests to check your risk for health problems. · Talk with your doctor about steps you can take to stay healthy or improve your health. You may need to make lifestyle changes to gain or maintain weight and stay healthy, such as getting more healthy foods in your diet and doing exercises to build muscle. Where can you learn more? Go to http://rickie-manjit.info/. Enter S176 in the search box to learn more about \"Body Mass Index: Care Instructions. \"  Current as of: January 23, 2017  Content Version: 11.3  © 6879-8853 [employer]. Care instructions adapted under license by [employer] (which disclaims liability or warranty for this information). If you have questions about a medical condition or this instruction, always ask your healthcare professional. [place] [address] any warranty or liability for your use of this information.

## 2017-09-20 ENCOUNTER — CLINICAL SUPPORT (OUTPATIENT)
Dept: FAMILY MEDICINE CLINIC | Age: 32
End: 2017-09-20

## 2017-09-20 VITALS
HEIGHT: 64 IN | SYSTOLIC BLOOD PRESSURE: 114 MMHG | DIASTOLIC BLOOD PRESSURE: 79 MMHG | HEART RATE: 70 BPM | BODY MASS INDEX: 49.2 KG/M2 | WEIGHT: 288.2 LBS

## 2017-09-20 DIAGNOSIS — E66.01 MORBID OBESITY DUE TO EXCESS CALORIES (HCC): Primary | ICD-10-CM

## 2017-09-20 NOTE — PROGRESS NOTES
Progress Note: Weekly Education Class in the [facility]   Is there anything that you or the patient needs to let the [address] [facility] Physician know about? no  Over the past week, have you experienced any side-effects? no    Debby Lutz is a 34 y.o. female who is enrolled in [facility]    Visit Vitals    /79 (BP 1 Location: Right arm, BP Patient Position: Sitting)    Pulse 70    Ht 5' 4\" (1.626 m)    Wt 288 lb 3.2 oz (130.7 kg)    BMI 49.47 kg/m2     Weight Metrics 9/20/2017 9/14/2017 9/14/2017 9/6/2017 9/6/2017 8/30/2017 8/23/2017   Weight 288 lb 3.2 oz - 288 lb 8 oz - 295 lb 12.8 oz 299 lb 303 lb 6.4 oz   Waist Measure Inches 54 57 - 57 - 58 58   Exercise Mins/week - 60 - - - - -   Body Fat % - 48.7 - - - - -   BMI 49.47 kg/m2 - 49.52 kg/m2 - 50.77 kg/m2 51.32 kg/m2 52.08 kg/m2         Have you received any other medical care this week? yes  If yes, where and for what? Had a virus, sore throat, congested    Have you had any change in your medications since your last visit? no  If yes what? Did you have any problems adhering to the program last week? yes  If yes, please explain: did not feel well all week      Eating Habits Over Last Week:  Did you take in 64 oz of non-caloric fluids?  no     Did you consume your 4 meal replacements each day? no       Physical Activity Over the Past Week:    Aerobic exercise: 149 min  Resistance exercise: 0 workouts / week

## 2017-09-27 ENCOUNTER — CLINICAL SUPPORT (OUTPATIENT)
Dept: FAMILY MEDICINE CLINIC | Age: 32
End: 2017-09-27

## 2017-09-27 VITALS
SYSTOLIC BLOOD PRESSURE: 116 MMHG | DIASTOLIC BLOOD PRESSURE: 79 MMHG | HEIGHT: 64 IN | WEIGHT: 288 LBS | HEART RATE: 76 BPM | BODY MASS INDEX: 49.17 KG/M2

## 2017-09-27 DIAGNOSIS — E66.01 MORBID OBESITY DUE TO EXCESS CALORIES (HCC): Primary | ICD-10-CM

## 2017-09-27 NOTE — PROGRESS NOTES
Progress Note: Weekly Education Class in the [facility]   Is there anything that you or the patient needs to let the [address] [facility] Physician know about? no  Over the past week, have you experienced any side-effects? no    Patricia Ramírez is a 34 y.o. female who is enrolled in [facility]    Visit Vitals    /79 (BP 1 Location: Right arm, BP Patient Position: Sitting)    Pulse 76    Ht 5' 4\" (1.626 m)    Wt 288 lb (130.6 kg)    BMI 49.44 kg/m2     Weight Metrics 9/27/2017 9/20/2017 9/14/2017 9/14/2017 9/6/2017 9/6/2017 8/30/2017   Weight 288 lb 288 lb 3.2 oz - 288 lb 8 oz - 295 lb 12.8 oz 299 lb   Waist Measure Inches 56 54 57 - 57 - 58   Exercise Mins/week - - 60 - - - -   Body Fat % - - 48.7 - - - -   BMI 49.44 kg/m2 49.47 kg/m2 - 49.52 kg/m2 - 50.77 kg/m2 51.32 kg/m2         Have you received any other medical care this week? no  If yes, where and for what? Have you had any change in your medications since your last visit? no  If yes what? Did you have any problems adhering to the program last week? no  If yes, please explain:       Eating Habits Over Last Week:  Did you take in 64 oz of non-caloric fluids? yes     Did you consume your 4 meal replacements each day?  yes       Physical Activity Over the Past Week:    Aerobic exercise: 210 min  Resistance exercise: 135 min workouts / week

## 2017-10-04 ENCOUNTER — CLINICAL SUPPORT (OUTPATIENT)
Dept: FAMILY MEDICINE CLINIC | Age: 32
End: 2017-10-04

## 2017-10-04 VITALS
DIASTOLIC BLOOD PRESSURE: 80 MMHG | HEART RATE: 82 BPM | WEIGHT: 283.4 LBS | SYSTOLIC BLOOD PRESSURE: 123 MMHG | BODY MASS INDEX: 48.38 KG/M2 | HEIGHT: 64 IN

## 2017-10-04 DIAGNOSIS — E66.01 MORBID OBESITY DUE TO EXCESS CALORIES (HCC): Primary | ICD-10-CM

## 2017-10-04 NOTE — PROGRESS NOTES
Progress Note: Weekly Education Class in the [facility]   Is there anything that you or the patient needs to let the [address] [facility] Physician know about? no  Over the past week, have you experienced any side-effects? no    Sofia Anna is a 34 y.o. female who is enrolled in [facility]    Visit Vitals    /80 (BP 1 Location: Right arm, BP Patient Position: Sitting)    Pulse 82    Ht 5' 4\" (1.626 m)    Wt 283 lb 6.4 oz (128.5 kg)    BMI 48.65 kg/m2     Weight Metrics 10/4/2017 9/27/2017 9/20/2017 9/14/2017 9/14/2017 9/6/2017 9/6/2017   Weight 283 lb 6.4 oz 288 lb 288 lb 3.2 oz - 288 lb 8 oz - 295 lb 12.8 oz   Waist Measure Inches 52 56 54 57 - 57 -   Exercise Mins/week - - - 60 - - -   Body Fat % - - - 48.7 - - -   BMI 48.65 kg/m2 49.44 kg/m2 49.47 kg/m2 - 49.52 kg/m2 - 50.77 kg/m2         Have you received any other medical care this week? yes  If yes, where and for what? Received Influenza Shot 10-03-17    Have you had any change in your medications since your last visit? no  If yes what? Did you have any problems adhering to the program last week? no  If yes, please explain:       Eating Habits Over Last Week:  Did you take in 64 oz of non-caloric fluids?  no     Did you consume your 4 meal replacements each day? no       Physical Activity Over the Past Week:    Aerobic exercise: 187 min  Resistance exercise: 0 workouts / week

## 2017-10-09 ENCOUNTER — HOSPITAL ENCOUNTER (OUTPATIENT)
Dept: LAB | Age: 32
Discharge: HOME | End: 2017-10-09

## 2017-10-09 LAB — SENTARA SPECIMEN COL,SENBCF: NORMAL

## 2017-10-09 PROCEDURE — 99001 SPECIMEN HANDLING PT-LAB: CPT | Performed by: NURSE PRACTITIONER

## 2017-10-11 ENCOUNTER — CLINICAL SUPPORT (OUTPATIENT)
Dept: FAMILY MEDICINE CLINIC | Age: 32
End: 2017-10-11

## 2017-10-11 VITALS
DIASTOLIC BLOOD PRESSURE: 84 MMHG | SYSTOLIC BLOOD PRESSURE: 126 MMHG | HEIGHT: 64 IN | HEART RATE: 68 BPM | WEIGHT: 280 LBS | BODY MASS INDEX: 47.8 KG/M2

## 2017-10-11 DIAGNOSIS — E66.01 MORBID OBESITY DUE TO EXCESS CALORIES (HCC): Primary | ICD-10-CM

## 2017-10-11 NOTE — PROGRESS NOTES
Progress Note: Weekly Education Class in the [facility]   Is there anything that you or the patient needs to let the [address] [facility] Physician know about? no  Over the past week, have you experienced any side-effects? no    Demetrio Marroquin is a 34 y.o. female who is enrolled in [facility]    Visit Vitals    /84 (BP 1 Location: Right arm, BP Patient Position: Sitting)    Pulse 68    Ht 5' 4\" (1.626 m)    Wt 280 lb (127 kg)    BMI 48.06 kg/m2     Weight Metrics 10/11/2017 10/4/2017 9/27/2017 9/20/2017 9/14/2017 9/14/2017 9/6/2017   Weight 280 lb 283 lb 6.4 oz 288 lb 288 lb 3.2 oz - 288 lb 8 oz -   Waist Measure Inches 53 52 56 54 57 - 57   Exercise Mins/week - - - - 60 - -   Body Fat % - - - - 48.7 - -   BMI 48.06 kg/m2 48.65 kg/m2 49.44 kg/m2 49.47 kg/m2 - 49.52 kg/m2 -         Have you received any other medical care this week? no  If yes, where and for what? Have you had any change in your medications since your last visit? no  If yes what? Did you have any problems adhering to the program last week? no  If yes, please explain:       Eating Habits Over Last Week:  Did you take in 64 oz of non-caloric fluids? no     Did you consume your 4 meal replacements each day?  yes       Physical Activity Over the Past Week:    Aerobic exercise: 208 min  Resistance exercise: 0 workouts / week

## 2017-10-19 ENCOUNTER — OFFICE VISIT (OUTPATIENT)
Dept: INTERNAL MEDICINE CLINIC | Age: 32
End: 2017-10-19

## 2017-10-19 VITALS
OXYGEN SATURATION: 98 % | SYSTOLIC BLOOD PRESSURE: 111 MMHG | HEART RATE: 73 BPM | DIASTOLIC BLOOD PRESSURE: 68 MMHG | BODY MASS INDEX: 46.81 KG/M2 | WEIGHT: 274.2 LBS | RESPIRATION RATE: 12 BRPM | TEMPERATURE: 98.3 F | HEIGHT: 64 IN

## 2017-10-19 DIAGNOSIS — E66.01 MORBID OBESITY DUE TO EXCESS CALORIES (HCC): Primary | ICD-10-CM

## 2017-10-19 DIAGNOSIS — R73.03 PRE-DIABETES: ICD-10-CM

## 2017-10-19 DIAGNOSIS — I10 ESSENTIAL HYPERTENSION: ICD-10-CM

## 2017-10-19 DIAGNOSIS — E66.01 MORBID OBESITY DUE TO EXCESS CALORIES (HCC): ICD-10-CM

## 2017-10-19 NOTE — MR AVS SNAPSHOT
Visit Information Date & Time Provider Department Dept. Phone Encounter #  
 10/19/2017  8:45 AM Angela Vann NP [facility] [phone] [id number] Follow-up Instructions Return in about 4 weeks (around 11/16/2017). Your Appointments 11/8/2017  9:30 AM  
Office Visit with Izabel Nascimento MD  
[facility]) Appt Note: ov 6mos. gaffney [address], [address] [address] [address] [place]  
  
   
 [address], [address] Upcoming Health Maintenance Date Due  
 PAP AKA CERVICAL CYTOLOGY 5/1/2015 DTaP/Tdap/Td series (2 - Td) 11/1/2022 Allergies as of 10/19/2017  Review Complete On: 10/19/2017 By: Angela Vann NP No Known Allergies Current Immunizations  Never Reviewed Name Date Influenza Vaccine 10/3/2017 Tdap 11/1/2012 Not reviewed this visit You Were Diagnosed With   
  
 Codes Comments Morbid obesity due to excess calories ([facility] [address]    -  Primary ICD-10-CM: E66.01 
ICD-9-CM: 278.01 Pre-diabetes     ICD-10-CM: R73.03 
ICD-9-CM: 790.29 Essential hypertension     ICD-10-CM: I10 
ICD-9-CM: 401.9 Vitals BP Pulse Temp Resp Height(growth percentile) Weight(growth percentile) 111/68 (BP 1 Location: Left arm, BP Patient Position: Sitting) 73 98.3 °F (36.8 °C) (Oral) 12 5' 4\" (1.626 m) 274 lb 3.2 oz (124.4 kg) SpO2 BMI OB Status Smoking Status 98% 47.07 kg/m2 IUD Never Smoker BMI and BSA Data Body Mass Index Body Surface Area 47.07 kg/m 2 2.37 m 2 Preferred Pharmacy Pharmacy Name Phone [id number] [place], [address] [phone] Your Updated Medication List  
  
   
This list is accurate as of: 10/19/17  9:12 AM.  Always use your most recent med list.  
  
  
  
  
 lisinopril 20 mg tablet Commonly known as:  Genoveva Loss  
 TAKE ONE TABLET BY MOUTH ONCE DAILY MIRENA 20 mcg/24 hr (5 years) IUD Generic drug:  levonorgestrel 1 Each by IntraUTERine route once. NIFEdipine ER 90 mg ER tablet Commonly known as:  PROCARDIA XL  
TAKE ONE TABLET BY MOUTH ONCE DAILY Follow-up Instructions Return in about 4 weeks (around 11/16/2017). To-Do List   
 10/19/2017 Lab:  METABOLIC PANEL, COMPREHENSIVE   
  
 10/19/2017 Lab:  URIC ACID Patient Instructions Health Maintenance Due Topic Date Due  
 PAP AKA CERVICAL CYTOLOGY  05/01/2015 Monthly goal: 
 
10-15 lbs Continue with exercise, watch intensity- make sure you can talk, moderate intensity is max for now. Body Mass Index: Care Instructions Your Care Instructions Body mass index (BMI) can help you see if your weight is raising your risk for health problems. It uses a formula to compare how much you weigh with how tall you are. · A BMI lower than 18.5 is considered underweight. · A BMI between 18.5 and 24.9 is considered healthy. · A BMI between 25 and 29.9 is considered overweight. A BMI of 30 or higher is considered obese. If your BMI is in the normal range, it means that you have a lower risk for weight-related health problems. If your BMI is in the overweight or obese range, you may be at increased risk for weight-related health problems, such as high blood pressure, heart disease, stroke, arthritis or joint pain, and diabetes. If your BMI is in the underweight range, you may be at increased risk for health problems such as fatigue, lower protection (immunity) against illness, muscle loss, bone loss, hair loss, and hormone problems. BMI is just one measure of your risk for weight-related health problems. You may be at higher risk for health problems if you are not active, you eat an unhealthy diet, or you drink too much alcohol or use tobacco products. Follow-up care is a key part of your treatment and safety. Be sure to make and go to all appointments, and call your doctor if you are having problems. It's also a good idea to know your test results and keep a list of the medicines you take. How can you care for yourself at home? · Practice healthy eating habits. This includes eating plenty of fruits, vegetables, whole grains, lean protein, and low-fat dairy. · If your doctor recommends it, get more exercise. Walking is a good choice. Bit by bit, increase the amount you walk every day. Try for at least 30 minutes on most days of the week. · Do not smoke. Smoking can increase your risk for health problems. If you need help quitting, talk to your doctor about stop-smoking programs and medicines. These can increase your chances of quitting for good. · Limit alcohol to 2 drinks a day for men and 1 drink a day for women. Too much alcohol can cause health problems. If you have a BMI higher than 25 · Your doctor may do other tests to check your risk for weight-related health problems. This may include measuring the distance around your waist. A waist measurement of more than 40 inches in men or 35 inches in women can increase the risk of weight-related health problems. · Talk with your doctor about steps you can take to stay healthy or improve your health. You may need to make lifestyle changes to lose weight and stay healthy, such as changing your diet and getting regular exercise. If you have a BMI lower than 18.5 · Your doctor may do other tests to check your risk for health problems. · Talk with your doctor about steps you can take to stay healthy or improve your health. You may need to make lifestyle changes to gain or maintain weight and stay healthy, such as getting more healthy foods in your diet and doing exercises to build muscle. Where can you learn more? Go to http://rickie-manjit.info/. Enter S176 in the search box to learn more about \"Body Mass Index: Care Instructions. \" Current as of: January 23, 2017 Content Version: 11.3 © 8162-1130 [employer]. Care instructions adapted under license by [employer] (which disclaims liability or warranty for this information). If you have questions about a medical condition or this instruction, always ask your healthcare professional. [address] any warranty or liability for your use of this information. Introducing [facility]! Dear Karuna Monroy: Thank you for requesting a [employer] account. Our records indicate that you already have an active [employer] account. You can access your account anytime at [email or web address] Did you know that you can access your hospital and ER discharge instructions at any time in [employer]? You can also review all of your test results from your hospital stay or ER visit. Additional Information If you have questions, please visit the Frequently Asked Questions section of the [employer] website at [email or web address] Remember, [employer] is NOT to be used for urgent needs. For medical emergencies, dial 911. Now available from your iPhone and Android! Please provide this summary of care documentation to your next provider. Your primary care clinician is listed as Tammy Pillai. If you have any questions after today's visit, please call [phone].

## 2017-10-19 NOTE — PROGRESS NOTES
[facility] Progress Note:   F/up Physician Visit    CC: Obesity      Tino Adams is a 34 y.o. female who is here for her  f/up physician visit for the VLCD Program. Virgilio Hinds has completed 9 weeks of the program to date. 14 lbs weight loss since last visit. Starting to exercise.     Starting weight 307.2 lb Body mass index is 52.73 kg/(m^2). Current weight: 274 lbs  Goal weight 180 lb      lbs, next due 257 lbs    Weight Metrics 10/19/2017 10/11/2017 10/4/2017 9/27/2017 9/20/2017 9/14/2017 9/14/2017   Weight 274 lb 3.2 oz 280 lb 283 lb 6.4 oz 288 lb 288 lb 3.2 oz - 288 lb 8 oz   Waist Measure Inches 53 53 52 56 54 57 -   Exercise Mins/week 125 - - - - 60 -   Body Fat % 47.6 - - - - 48.7 -   BMI 47.07 kg/m2 48.06 kg/m2 48.65 kg/m2 49.44 kg/m2 49.47 kg/m2 - 49.52 kg/m2         Current Outpatient Prescriptions   Medication Sig Dispense Refill    NIFEdipine ER (PROCARDIA XL) 90 mg ER tablet TAKE ONE TABLET BY MOUTH ONCE DAILY 90 Tab 1    lisinopril (PRINIVIL, ZESTRIL) 20 mg tablet TAKE ONE TABLET BY MOUTH ONCE DAILY 90 Tab 1    levonorgestrel (MIRENA) 20 mcg/24 hr IUD 1 Each by IntraUTERine route once. Participation   Did you attend clinic and class last week? yes    Review of Systems  Since your last visit, have you experienced any complications? no  If yes, please list:     No CP, SOB, palpitations, lightheadedness, dizziness, constipation. Positives highlight in BOLD. Are you taking an appetite suppressant? no  If so, is there any Chest Pain, Palpitations or Dizziness? BP Readings from Last 10 Encounters:   10/19/17 111/68   10/11/17 126/84   10/04/17 123/80   09/27/17 116/79   09/20/17 114/79   09/14/17 128/87   09/06/17 119/81   08/30/17 134/81   08/23/17 121/85   08/17/17 139/88         Have you received any other medical care this week? no  If yes, where and for what?        Have you discontinued or changed any medicine or dose of your medicine since your last visit? no  If yes, where and for what? Diet  How many ounces of calorie-free liquids did you consume each day?  80 oz    How many meal replacements did you take each day? 4    Did you have any problems adhering to the program?  no If yes, please explain:       Exercise  Aerobic exercise: 125 min  Resistance exercise: 0 workouts / week  Any discomfort?  no     If yes, where? Review of Systems  Complete ROS negative except where noted above    Objective  Visit Vitals    /68 (BP 1 Location: Left arm, BP Patient Position: Sitting)    Pulse 73    Temp 98.3 °F (36.8 °C) (Oral)    Resp 12    Ht 5' 4\" (1.626 m)    Wt 274 lb 3.2 oz (124.4 kg)    SpO2 98%    BMI 47.07 kg/m2     No LMP recorded. Patient is not currently having periods (Reason: IUD). Waist Circumference: I personally reviewed patient's Weight Management Doc Flowsheet  Neck Circumference: I personally reviewed patient's Weight Management Doc Flowsheet  Percent Body Fat: I personally reviewed patient's Weight Management Doc Flowsheet    Physical Exam  Appearance: well appearing, obese, A&O, NAD  HEENT:  NC/AT, PERRL, No scleral icterus  Heart:  RRR without M/R/G  Lungs:  CTAB, no rhonchi, rales, or wheezes with good air exchange   Ext:  No LE Edema    Assessment / Plan    Encounter Diagnoses   Name Primary?  Morbid obesity due to excess calories ([address] Yes    Pre-diabetes     Essential hypertension        1. Weight management well controlled   Progress was reviewed with patient    2. Labs    Latest results reviewed with patient   Lab slip given to pt for f/up HDL labs    3.  Diet regimen   # of meal replacements prescribed: 4   If modified LCD-nutritional guidelines:    Monthly Goal   As below    Medical monitoring schedule:   Weekly BP/Weight checks   Monthly provider appointments          Patient Instructions     Health Maintenance Due   Topic Date Due    PAP AKA CERVICAL CYTOLOGY  05/01/2015     Monthly goal:    10-15 lbs    Continue with exercise, watch intensity- make sure you can talk, moderate intensity is max for now. Body Mass Index: Care Instructions  Your Care Instructions    Body mass index (BMI) can help you see if your weight is raising your risk for health problems. It uses a formula to compare how much you weigh with how tall you are. · A BMI lower than 18.5 is considered underweight. · A BMI between 18.5 and 24.9 is considered healthy. · A BMI between 25 and 29.9 is considered overweight. A BMI of 30 or higher is considered obese. If your BMI is in the normal range, it means that you have a lower risk for weight-related health problems. If your BMI is in the overweight or obese range, you may be at increased risk for weight-related health problems, such as high blood pressure, heart disease, stroke, arthritis or joint pain, and diabetes. If your BMI is in the underweight range, you may be at increased risk for health problems such as fatigue, lower protection (immunity) against illness, muscle loss, bone loss, hair loss, and hormone problems. BMI is just one measure of your risk for weight-related health problems. You may be at higher risk for health problems if you are not active, you eat an unhealthy diet, or you drink too much alcohol or use tobacco products. Follow-up care is a key part of your treatment and safety. Be sure to make and go to all appointments, and call your doctor if you are having problems. It's also a good idea to know your test results and keep a list of the medicines you take. How can you care for yourself at home? · Practice healthy eating habits. This includes eating plenty of fruits, vegetables, whole grains, lean protein, and low-fat dairy. · If your doctor recommends it, get more exercise. Walking is a good choice. Bit by bit, increase the amount you walk every day. Try for at least 30 minutes on most days of the week. · Do not smoke.  Smoking can increase your risk for health problems. If you need help quitting, talk to your doctor about stop-smoking programs and medicines. These can increase your chances of quitting for good. · Limit alcohol to 2 drinks a day for men and 1 drink a day for women. Too much alcohol can cause health problems. If you have a BMI higher than 25  · Your doctor may do other tests to check your risk for weight-related health problems. This may include measuring the distance around your waist. A waist measurement of more than 40 inches in men or 35 inches in women can increase the risk of weight-related health problems. · Talk with your doctor about steps you can take to stay healthy or improve your health. You may need to make lifestyle changes to lose weight and stay healthy, such as changing your diet and getting regular exercise. If you have a BMI lower than 18.5  · Your doctor may do other tests to check your risk for health problems. · Talk with your doctor about steps you can take to stay healthy or improve your health. You may need to make lifestyle changes to gain or maintain weight and stay healthy, such as getting more healthy foods in your diet and doing exercises to build muscle. Where can you learn more? Go to http://rickie"[employer]manjit.info/. Enter S176 in the search box to learn more about \"Body Mass Index: Care Instructions. \"  Current as of: January 23, 2017  Content Version: 11.3  © 2812-6239 [employer]. Care instructions adapted under license by [employer] (which disclaims liability or warranty for this information). If you have questions about a medical condition or this instruction, always ask your healthcare professional. [place] [address] any warranty or liability for your use of this information. Follow-up Disposition:  Return in about 4 weeks (around 11/16/2017).       10 minutes of the 15 minutes face to face time with Sandi Salazar consisted of counseling & coordinating and/or discussing treatment plans in reference to her The primary encounter diagnosis was Morbid obesity due to excess calories ([address] Diagnoses of Pre-diabetes and Essential hypertension were also pertinent to this visit. The patient is to follow up as scheduled and will report to the ED or the office if symptoms change or increase. The patient has voiced understanding and will comply.

## 2017-10-19 NOTE — PROGRESS NOTES
Chief Complaint   Patient presents with    Weight Management     1. Have you been to the ER, urgent care clinic since your last visit? Hospitalized since your last visit? No    2. Have you seen or consulted any other health care providers outside of the [address] [place] [address] since your last visit? Include any pap smears or colon screening.  No

## 2017-10-19 NOTE — PATIENT INSTRUCTIONS
Health Maintenance Due   Topic Date Due    PAP AKA CERVICAL CYTOLOGY  05/01/2015     Monthly goal:    10-15 lbs    Continue with exercise, watch intensity- make sure you can talk, moderate intensity is max for now. Body Mass Index: Care Instructions  Your Care Instructions    Body mass index (BMI) can help you see if your weight is raising your risk for health problems. It uses a formula to compare how much you weigh with how tall you are. · A BMI lower than 18.5 is considered underweight. · A BMI between 18.5 and 24.9 is considered healthy. · A BMI between 25 and 29.9 is considered overweight. A BMI of 30 or higher is considered obese. If your BMI is in the normal range, it means that you have a lower risk for weight-related health problems. If your BMI is in the overweight or obese range, you may be at increased risk for weight-related health problems, such as high blood pressure, heart disease, stroke, arthritis or joint pain, and diabetes. If your BMI is in the underweight range, you may be at increased risk for health problems such as fatigue, lower protection (immunity) against illness, muscle loss, bone loss, hair loss, and hormone problems. BMI is just one measure of your risk for weight-related health problems. You may be at higher risk for health problems if you are not active, you eat an unhealthy diet, or you drink too much alcohol or use tobacco products. Follow-up care is a key part of your treatment and safety. Be sure to make and go to all appointments, and call your doctor if you are having problems. It's also a good idea to know your test results and keep a list of the medicines you take. How can you care for yourself at home? · Practice healthy eating habits. This includes eating plenty of fruits, vegetables, whole grains, lean protein, and low-fat dairy. · If your doctor recommends it, get more exercise. Walking is a good choice.  Bit by bit, increase the amount you walk every day. Try for at least 30 minutes on most days of the week. · Do not smoke. Smoking can increase your risk for health problems. If you need help quitting, talk to your doctor about stop-smoking programs and medicines. These can increase your chances of quitting for good. · Limit alcohol to 2 drinks a day for men and 1 drink a day for women. Too much alcohol can cause health problems. If you have a BMI higher than 25  · Your doctor may do other tests to check your risk for weight-related health problems. This may include measuring the distance around your waist. A waist measurement of more than 40 inches in men or 35 inches in women can increase the risk of weight-related health problems. · Talk with your doctor about steps you can take to stay healthy or improve your health. You may need to make lifestyle changes to lose weight and stay healthy, such as changing your diet and getting regular exercise. If you have a BMI lower than 18.5  · Your doctor may do other tests to check your risk for health problems. · Talk with your doctor about steps you can take to stay healthy or improve your health. You may need to make lifestyle changes to gain or maintain weight and stay healthy, such as getting more healthy foods in your diet and doing exercises to build muscle. Where can you learn more? Go to http://rickie-manjit.info/. Enter S176 in the search box to learn more about \"Body Mass Index: Care Instructions. \"  Current as of: January 23, 2017  Content Version: 11.3  © 0258-2802 [employer]. Care instructions adapted under license by [employer] (which disclaims liability or warranty for this information). If you have questions about a medical condition or this instruction, always ask your healthcare professional. [address] any warranty or liability for your use of this information.

## 2017-10-25 ENCOUNTER — CLINICAL SUPPORT (OUTPATIENT)
Dept: FAMILY MEDICINE CLINIC | Age: 32
End: 2017-10-25

## 2017-10-25 DIAGNOSIS — E66.01 MORBID OBESITY DUE TO EXCESS CALORIES (HCC): Primary | ICD-10-CM

## 2017-10-26 VITALS
HEART RATE: 76 BPM | SYSTOLIC BLOOD PRESSURE: 124 MMHG | HEIGHT: 64 IN | BODY MASS INDEX: 46.98 KG/M2 | DIASTOLIC BLOOD PRESSURE: 78 MMHG | WEIGHT: 275.2 LBS

## 2017-10-26 NOTE — PROGRESS NOTES
Progress Note: Weekly Education Class in the [facility]   Is there anything that you or the patient needs to let the [facility] Physician know about? no  Over the past week, have you experienced any side-effects? no    Demetrio Marroquin is a 34 y.o. female who is enrolled in [facility]    Visit Vitals    /78 (BP 1 Location: Right arm, BP Patient Position: Sitting)    Pulse 76    Ht 5' 4\" (1.626 m)    Wt 275 lb 3.2 oz (124.8 kg)    BMI 47.24 kg/m2     Weight Metrics 10/26/2017 10/25/2017 10/19/2017 10/19/2017 10/11/2017 10/4/2017 9/27/2017   Weight - 275 lb 3.2 oz - 274 lb 3.2 oz 280 lb 283 lb 6.4 oz 288 lb   Waist Measure Inches 55 - 53 - 53 52 56   Exercise Mins/week - - 125 - - - -   Body Fat % - - 47.6 - - - -   BMI - 47.24 kg/m2 - 47.07 kg/m2 48.06 kg/m2 48.65 kg/m2 49.44 kg/m2         Have you received any other medical care this week? no  If yes, where and for what? Have you had any change in your medications since your last visit? no  If yes what? Did you have any problems adhering to the program last week? no  If yes, please explain:       Eating Habits Over Last Week:  Did you take in 64 oz of non-caloric fluids? yes     Did you consume your 4 meal replacements each day?  yes       Physical Activity Over the Past Week:    Aerobic exercise: 120 min  Resistance exercise: 0 workouts / week

## 2017-11-01 ENCOUNTER — CLINICAL SUPPORT (OUTPATIENT)
Dept: FAMILY MEDICINE CLINIC | Age: 32
End: 2017-11-01

## 2017-11-01 VITALS
WEIGHT: 271.8 LBS | DIASTOLIC BLOOD PRESSURE: 86 MMHG | HEIGHT: 64 IN | BODY MASS INDEX: 46.4 KG/M2 | SYSTOLIC BLOOD PRESSURE: 122 MMHG | HEART RATE: 65 BPM

## 2017-11-01 DIAGNOSIS — E66.01 MORBID OBESITY DUE TO EXCESS CALORIES (HCC): Primary | ICD-10-CM

## 2017-11-01 NOTE — PROGRESS NOTES
Progress Note: Weekly Education Class in the [facility] Loss Program   Is there anything that you or the patient needs to let the [facility] Physician know about? no  Over the past week, have you experienced any side-effects? no    Elva Fields is a 34 y.o. female who is enrolled in [facility]    Visit Vitals    /86 (BP 1 Location: Right arm, BP Patient Position: Sitting)    Pulse 65    Ht 5' 4\" (1.626 m)    Wt 271 lb 12.8 oz (123.3 kg)    BMI 46.65 kg/m2     Weight Metrics 11/1/2017 10/26/2017 10/25/2017 10/19/2017 10/19/2017 10/11/2017 10/4/2017   Weight 271 lb 12.8 oz - 275 lb 3.2 oz - 274 lb 3.2 oz 280 lb 283 lb 6.4 oz   Waist Measure Inches 53 55 - 53 - 53 52   Exercise Mins/week - - - 125 - - -   Body Fat % - - - 47.6 - - -   BMI 46.65 kg/m2 - 47.24 kg/m2 - 47.07 kg/m2 48.06 kg/m2 48.65 kg/m2         Have you received any other medical care this week? no  If yes, where and for what? Have you had any change in your medications since your last visit? no  If yes what? Did you have any problems adhering to the program last week? no  If yes, please explain:       Eating Habits Over Last Week:  Did you take in 64 oz of non-caloric fluids?  yes     Did you consume your 4 meal replacements each day? no       Physical Activity Over the Past Week:    Aerobic exercise: 150 min  Resistance exercise: 0 workouts / week

## 2017-11-08 ENCOUNTER — CLINICAL SUPPORT (OUTPATIENT)
Dept: FAMILY MEDICINE CLINIC | Age: 32
End: 2017-11-08

## 2017-11-08 DIAGNOSIS — E66.01 MORBID OBESITY DUE TO EXCESS CALORIES (HCC): Primary | ICD-10-CM

## 2017-11-09 VITALS
DIASTOLIC BLOOD PRESSURE: 89 MMHG | HEART RATE: 63 BPM | HEIGHT: 64 IN | SYSTOLIC BLOOD PRESSURE: 147 MMHG | BODY MASS INDEX: 45.99 KG/M2 | WEIGHT: 269.4 LBS

## 2017-11-09 NOTE — PROGRESS NOTES
Progress Note: Weekly Education Class in the [facility]   Is there anything that you or the patient needs to let the [facility] Physician know about? No  Over the past week, have you experienced any side-effects? no    Kat Jasso is a 34 y.o. female who is enrolled in [facility]    Visit Vitals    /89 (BP 1 Location: Right arm, BP Patient Position: Sitting)    Pulse 63    Ht 5' 4\" (1.626 m)    Wt 269 lb 6.4 oz (122.2 kg)    BMI 46.24 kg/m2     Weight Metrics 11/9/2017 11/8/2017 11/1/2017 10/26/2017 10/25/2017 10/19/2017 10/19/2017   Weight - 269 lb 6.4 oz 271 lb 12.8 oz - 275 lb 3.2 oz - 274 lb 3.2 oz   Waist Measure Inches 52 - 53 55 - 53 -   Exercise Mins/week - - - - - 125 -   Body Fat % - - - - - 47.6 -   BMI - 46.24 kg/m2 46.65 kg/m2 - 47.24 kg/m2 - 47.07 kg/m2         Have you received any other medical care this week? no  If yes, where and for what? Have you had any change in your medications since your last visit? no  If yes what? Did you have any problems adhering to the program last week? no  If yes, please explain:       Eating Habits Over Last Week:  Did you take in 64 oz of non-caloric fluids?  no     Did you consume your 4 meal replacements each day? no       Physical Activity Over the Past Week:    Aerobic exercise: 140 min  Resistance exercise: 0 workouts / week

## 2017-11-14 ENCOUNTER — HOSPITAL ENCOUNTER (OUTPATIENT)
Dept: LAB | Age: 32
Discharge: HOME | End: 2017-11-14

## 2017-11-14 LAB — SENTARA SPECIMEN COL,SENBCF: NORMAL

## 2017-11-14 PROCEDURE — 99001 SPECIMEN HANDLING PT-LAB: CPT | Performed by: NURSE PRACTITIONER

## 2017-11-16 ENCOUNTER — OFFICE VISIT (OUTPATIENT)
Dept: INTERNAL MEDICINE CLINIC | Age: 32
End: 2017-11-16

## 2017-11-16 VITALS
HEART RATE: 66 BPM | BODY MASS INDEX: 45.05 KG/M2 | DIASTOLIC BLOOD PRESSURE: 69 MMHG | TEMPERATURE: 97 F | HEIGHT: 64 IN | WEIGHT: 263.9 LBS | SYSTOLIC BLOOD PRESSURE: 119 MMHG | RESPIRATION RATE: 14 BRPM | OXYGEN SATURATION: 97 %

## 2017-11-16 DIAGNOSIS — R73.03 PRE-DIABETES: ICD-10-CM

## 2017-11-16 DIAGNOSIS — E66.01 MORBID OBESITY DUE TO EXCESS CALORIES (HCC): Primary | ICD-10-CM

## 2017-11-16 DIAGNOSIS — E66.01 MORBID OBESITY DUE TO EXCESS CALORIES (HCC): ICD-10-CM

## 2017-11-16 DIAGNOSIS — I10 ESSENTIAL HYPERTENSION: ICD-10-CM

## 2017-11-16 NOTE — PROGRESS NOTES
Chief Complaint   Patient presents with    Weight Management     1. Have you been to the ER, urgent care clinic since your last visit? Hospitalized since your last visit? No    2. Have you seen or consulted any other health care providers outside of the [address] [place] [address] since your last visit? Include any pap smears or colon screening.  No

## 2017-11-16 NOTE — MR AVS SNAPSHOT
Visit Information Date & Time Provider Department Dept. Phone Encounter #  
 11/16/2017  8:30 AM Keven Velazquez NP [facility] [phone] Follow-up Instructions Return in about 4 weeks (around 12/14/2017). Upcoming Health Maintenance Date Due  
 PAP AKA CERVICAL CYTOLOGY 5/1/2015 DTaP/Tdap/Td series (2 - Td) 11/1/2022 Allergies as of 11/16/2017  Review Complete On: 11/16/2017 By: Keven Velazquez NP No Known Allergies Current Immunizations  Never Reviewed Name Date Influenza Vaccine 10/3/2017 Tdap 11/1/2012 Not reviewed this visit You Were Diagnosed With   
  
 Codes Comments Morbid [facility] excess calories [facility]    -  Primary ICD-10-CM: E66.01 
ICD-9-CM: 278.01 Pre-diabetes     ICD-10-CM: R73.03 
ICD-9-CM: 790.29 Essential hypertension     ICD-10-CM: I10 
ICD-9-CM: 401.9 Vitals BP Pulse Temp Resp Height(growth percentile) Weight(growth percentile)  
 119/69 (BP 1 Location: Left arm, BP Patient Position: Sitting) 66 97 °F (36.1 °C) (Oral) 14 5' 4\" (1.626 m) 263 lb 14.4 oz (119.7 kg) SpO2 BMI OB Status Smoking Status 97% 45.3 kg/m2 IUD Never Smoker BMI and BSA Data Body Mass Index Body Surface Area  
 45.3 kg/m 2 2.32 m 2 Preferred Pharmacy Pharmacy Name Phone [facility] [address] [facility], [address] [place] [address] [phone] Your Updated Medication List  
  
   
This list is accurate as of: 11/16/17  9:17 AM.  Always use your most recent med list.  
  
  
  
  
 lisinopril 20 mg tablet Commonly known as:  PRINIVIL, ZESTRIL  
TAKE ONE TABLET BY MOUTH ONCE DAILY MIRENA 20 mcg/24 hr (5 years) Iud  
Generic drug:  levonorgestrel 1 Each by IntraUTERine route once. NIFEdipine ER 90 mg ER tablet Commonly known as:  PROCARDIA XL  
TAKE ONE TABLET BY MOUTH ONCE DAILY Follow-up Instructions Return in about 4 weeks (around 12/14/2017). To-Do List   
 11/16/2017 Lab:  METABOLIC PANEL, COMPREHENSIVE   
  
 11/16/2017 Lab:  URIC ACID Patient Instructions Health Maintenance Due Topic Date Due  
 PAP AKA CERVICAL CYTOLOGY  05/01/2015 Monthly goal: 
 
10-15 lbs weight loss Continue exercising. Watch little snacks. - look at them- don't eat. Body Mass Index: Care Instructions Your Care Instructions Body mass index (BMI) can help you see if your weight is raising your risk for health problems. It uses a formula to compare how much you weigh with how tall you are. · A BMI lower than 18.5 is considered underweight. · A BMI between 18.5 and 24.9 is considered healthy. · A BMI between 25 and 29.9 is considered overweight. A BMI of 30 or higher is considered obese. If your BMI is in the normal range, it means that you have a lower risk for weight-related health problems. If your BMI is in the overweight or obese range, you may be at increased risk for weight-related health problems, such as high blood pressure, heart disease, stroke, arthritis or joint pain, and diabetes. If your BMI is in the underweight range, you may be at increased risk for health problems such as fatigue, lower protection (immunity) against illness, muscle loss, bone loss, hair loss, and hormone problems. BMI is just one measure of your risk for weight-related health problems. You may be at higher risk for health problems if you are not active, you eat an unhealthy diet, or you drink too much alcohol or use tobacco products. Follow-up care is a key part of your treatment and safety. Be sure to make and go to all appointments, and call your doctor if you are having problems. It's also a good idea to know your test results and keep a list of the medicines you take. How can you care for yourself at home? · Practice healthy eating habits.  This includes eating plenty of fruits, vegetables, whole grains, lean protein, and low-fat dairy. · If your doctor recommends it, get more exercise. Walking is a good choice. Bit by bit, increase the amount you walk every day. Try for at least 30 minutes on most days of the week. · Do not smoke. Smoking can increase your risk for health problems. If you need help quitting, talk to your doctor about stop-smoking programs and medicines. These can increase your chances of quitting for good. · Limit alcohol to 2 drinks a day for men and 1 drink a day for women. Too much alcohol can cause health problems. If you have a BMI higher than 25 · Your doctor may do other tests to check your risk for weight-related health problems. This may include measuring the distance around your waist. A waist measurement of more than 40 inches in men or 35 inches in women can increase the risk of weight-related health problems. · Talk with your doctor about steps you can take to stay healthy or improve your health. You may need to make lifestyle changes to lose weight and stay healthy, such as changing your diet and getting regular exercise. If you have a BMI lower than 18.5 · Your doctor may do other tests to check your risk for health problems. · Talk with your doctor about steps you can take to stay healthy or improve your health. You may need to make lifestyle changes to gain or maintain weight and stay healthy, such as getting more healthy foods in your diet and doing exercises to build muscle. Where can you learn more? Go to http://rickie-manjit.info/. Enter S176 in the search box to learn more about \"Body Mass Index: Care Instructions. \" Current as of: October 13, 2016 Content Version: 11.4 © 5705-9620 Healthwise, Incorporated. Care instructions adapted under license by [employer] (which disclaims liability or warranty for this information).  If you have questions about a medical condition or this instruction, always ask your healthcare professional. [address] any warranty or liability for your use of this information. Introducing [facility] [place] [facility]! Dear Madelaine Carcamo: Thank you for requesting a [employer] account. Our records indicate that you already have an active [employer] account. You can access your account anytime at [email or web address] Did you know that you can access your hospital and ER discharge instructions at any time in [employer]? You can also review all of your test results from your hospital stay or ER visit. Additional Information If you have questions, please visit the Frequently Asked Questions section of the [employer] website at [email or web address] Remember, [employer] is NOT to be used for urgent needs. For medical emergencies, dial 911. Now available from your iPhone and Android! Please provide this summary of care documentation to your next provider. Your primary care clinician is listed as Laith Patten. If you have any questions after today's visit, please call [phone].

## 2017-11-16 NOTE — PROGRESS NOTES
[facility] Progress Note:   F/up Physician Visit    CC: Obesity      Roopa Apple is a 34 y.o. female who is here for her  f/up physician visit for the VLCD Program. Charli Voss has completed 13 weeks of the program to date. 11 lbs weight loss since last visit. Starting to exercise.     Starting weight 307.2 lb Body mass index is 52.73 kg/(m^2). Current weight: 263 lbs  Goal weight 180 lb       lbs, next due 257 lbs    Weight Metrics 11/16/2017 11/16/2017 11/9/2017 11/8/2017 11/1/2017 10/26/2017 10/25/2017   Weight - 263 lb 14.4 oz - 269 lb 6.4 oz 271 lb 12.8 oz - 275 lb 3.2 oz   Waist Measure Inches 51 - 52 - 53 55 -   Exercise Mins/week 100 - - - - - -   Body Fat % 46.7 - - - - - -   BMI - 45.3 kg/m2 - 46.24 kg/m2 46.65 kg/m2 - 47.24 kg/m2         Current Outpatient Prescriptions   Medication Sig Dispense Refill    NIFEdipine ER (PROCARDIA XL) 90 mg ER tablet TAKE ONE TABLET BY MOUTH ONCE DAILY 90 Tab 1    lisinopril (PRINIVIL, ZESTRIL) 20 mg tablet TAKE ONE TABLET BY MOUTH ONCE DAILY 90 Tab 1    levonorgestrel (MIRENA) 20 mcg/24 hr IUD 1 Each by IntraUTERine route once. Participation   Did you attend clinic and class last week? yes    Review of Systems  Since your last visit, have you experienced any complications? no  If yes, please list:     No CP, SOB, palpitations, lightheadedness, dizziness, constipation. Positives highlight in BOLD. Are you taking an appetite suppressant? no  If so, is there any Chest Pain, Palpitations or Dizziness? BP Readings from Last 10 Encounters:   11/16/17 119/69   11/09/17 147/89   11/01/17 122/86   10/26/17 124/78   10/19/17 111/68   10/11/17 126/84   10/04/17 123/80   09/27/17 116/79   09/20/17 114/79   09/14/17 128/87         Have you received any other medical care this week? no  If yes, where and for what?        Have you discontinued or changed any medicine or dose of your medicine since your last visit? no  If yes, where and for what? Diet  How many ounces of calorie-free liquids did you consume each day?  60 oz    How many meal replacements did you take each day? 4    Did you have any problems adhering to the program?  no If yes, please explain:       Exercise  Aerobic exercise: 100 min  Resistance exercise: 0 workouts / week  Any discomfort?  no     If yes, where? Review of Systems  Complete ROS negative except where noted above    Objective  Visit Vitals    /69 (BP 1 Location: Left arm, BP Patient Position: Sitting)    Pulse 66    Temp 97 °F (36.1 °C) (Oral)    Resp 14    Ht 5' 4\" (1.626 m)    Wt 263 lb 14.4 oz (119.7 kg)    SpO2 97%    BMI 45.3 kg/m2     No LMP recorded. Patient is not currently having periods (Reason: IUD). Waist Circumference: I personally reviewed patient's Weight Management Doc Flowsheet  Neck Circumference: I personally reviewed patient's Weight Management Doc Flowsheet  Percent Body Fat: I personally reviewed patient's Weight Management Doc Flowsheet    Physical Exam  Appearance: well appearing, obese, A&O, NAD  HEENT:  NC/AT, PERRL, No scleral icterus  Heart:  RRR without M/R/G  Lungs:  CTAB, no rhonchi, rales, or wheezes with good air exchange   Ext:  No LE Edema    Assessment / Plan    Encounter Diagnoses   Name Primary?  Morbid obesity due to excess calories ([address] Yes    Pre-diabetes     Essential hypertension        1. Weight management well controlled   Progress was reviewed with patient    2. Labs    Latest results reviewed with patient   Lab slip given to pt for f/up HDL labs    3.  Diet regimen   # of meal replacements prescribed: 4   If modified LCD-nutritional guidelines:    Monthly Goal   As below    Medical monitoring schedule:   Weekly BP/Weight checks   Monthly provider appointments          Patient Instructions     Health Maintenance Due   Topic Date Due    PAP AKA CERVICAL CYTOLOGY  05/01/2015     Monthly goal:    10-15 lbs weight loss    Continue exercising. Watch little snacks. - look at them- don't eat. Body Mass Index: Care Instructions  Your Care Instructions    Body mass index (BMI) can help you see if your weight is raising your risk for health problems. It uses a formula to compare how much you weigh with how tall you are. · A BMI lower than 18.5 is considered underweight. · A BMI between 18.5 and 24.9 is considered healthy. · A BMI between 25 and 29.9 is considered overweight. A BMI of 30 or higher is considered obese. If your BMI is in the normal range, it means that you have a lower risk for weight-related health problems. If your BMI is in the overweight or obese range, you may be at increased risk for weight-related health problems, such as high blood pressure, heart disease, stroke, arthritis or joint pain, and diabetes. If your BMI is in the underweight range, you may be at increased risk for health problems such as fatigue, lower protection (immunity) against illness, muscle loss, bone loss, hair loss, and hormone problems. BMI is just one measure of your risk for weight-related health problems. You may be at higher risk for health problems if you are not active, you eat an unhealthy diet, or you drink too much alcohol or use tobacco products. Follow-up care is a key part of your treatment and safety. Be sure to make and go to all appointments, and call your doctor if you are having problems. It's also a good idea to know your test results and keep a list of the medicines you take. How can you care for yourself at home? · Practice healthy eating habits. This includes eating plenty of fruits, vegetables, whole grains, lean protein, and low-fat dairy. · If your doctor recommends it, get more exercise. Walking is a good choice. Bit by bit, increase the amount you walk every day. Try for at least 30 minutes on most days of the week. · Do not smoke. Smoking can increase your risk for health problems.  If you need help quitting, talk to your doctor about stop-smoking programs and medicines. These can increase your chances of quitting for good. · Limit alcohol to 2 drinks a day for men and 1 drink a day for women. Too much alcohol can cause health problems. If you have a BMI higher than 25  · Your doctor may do other tests to check your risk for weight-related health problems. This may include measuring the distance around your waist. A waist measurement of more than 40 inches in men or 35 inches in women can increase the risk of weight-related health problems. · Talk with your doctor about steps you can take to stay healthy or improve your health. You may need to make lifestyle changes to lose weight and stay healthy, such as changing your diet and getting regular exercise. If you have a BMI lower than 18.5  · Your doctor may do other tests to check your risk for health problems. · Talk with your doctor about steps you can take to stay healthy or improve your health. You may need to make lifestyle changes to gain or maintain weight and stay healthy, such as getting more healthy foods in your diet and doing exercises to build muscle. Where can you learn more? Go to http://rickie-manjit.info/. Enter S176 in the search box to learn more about \"Body Mass Index: Care Instructions. \"  Current as of: October 13, 2016  Content Version: 11.4  © 9389-1251 Healthwise, Incorporated. Care instructions adapted under license by [employer] (which disclaims liability or warranty for this information). If you have questions about a medical condition or this instruction, always ask your healthcare professional. [place] [address] any warranty or liability for your use of this information. Follow-up Disposition:  Return in about 4 weeks (around 12/14/2017).       10 minutes of the 15 minutes face to face time with Sandinalini Hightower consisted of counseling & coordinating and/or discussing treatment plans in reference to her The primary encounter diagnosis was Morbid obesity due to excess calories ([address] Diagnoses of Pre-diabetes and Essential hypertension were also pertinent to this visit. The patient is to follow up as scheduled and will report to the ED or the office if symptoms change or increase. The patient has voiced understanding and will comply.

## 2017-11-16 NOTE — PATIENT INSTRUCTIONS
Health Maintenance Due   Topic Date Due    PAP AKA CERVICAL CYTOLOGY  05/01/2015     Monthly goal:    10-15 lbs weight loss    Continue exercising. Watch little snacks. - look at them- don't eat. Body Mass Index: Care Instructions  Your Care Instructions    Body mass index (BMI) can help you see if your weight is raising your risk for health problems. It uses a formula to compare how much you weigh with how tall you are. · A BMI lower than 18.5 is considered underweight. · A BMI between 18.5 and 24.9 is considered healthy. · A BMI between 25 and 29.9 is considered overweight. A BMI of 30 or higher is considered obese. If your BMI is in the normal range, it means that you have a lower risk for weight-related health problems. If your BMI is in the overweight or obese range, you may be at increased risk for weight-related health problems, such as high blood pressure, heart disease, stroke, arthritis or joint pain, and diabetes. If your BMI is in the underweight range, you may be at increased risk for health problems such as fatigue, lower protection (immunity) against illness, muscle loss, bone loss, hair loss, and hormone problems. BMI is just one measure of your risk for weight-related health problems. You may be at higher risk for health problems if you are not active, you eat an unhealthy diet, or you drink too much alcohol or use tobacco products. Follow-up care is a key part of your treatment and safety. Be sure to make and go to all appointments, and call your doctor if you are having problems. It's also a good idea to know your test results and keep a list of the medicines you take. How can you care for yourself at home? · Practice healthy eating habits. This includes eating plenty of fruits, vegetables, whole grains, lean protein, and low-fat dairy. · If your doctor recommends it, get more exercise. Walking is a good choice. Bit by bit, increase the amount you walk every day.  Try for at least 30 minutes on most days of the week. · Do not smoke. Smoking can increase your risk for health problems. If you need help quitting, talk to your doctor about stop-smoking programs and medicines. These can increase your chances of quitting for good. · Limit alcohol to 2 drinks a day for men and 1 drink a day for women. Too much alcohol can cause health problems. If you have a BMI higher than 25  · Your doctor may do other tests to check your risk for weight-related health problems. This may include measuring the distance around your waist. A waist measurement of more than 40 inches in men or 35 inches in women can increase the risk of weight-related health problems. · Talk with your doctor about steps you can take to stay healthy or improve your health. You may need to make lifestyle changes to lose weight and stay healthy, such as changing your diet and getting regular exercise. If you have a BMI lower than 18.5  · Your doctor may do other tests to check your risk for health problems. · Talk with your doctor about steps you can take to stay healthy or improve your health. You may need to make lifestyle changes to gain or maintain weight and stay healthy, such as getting more healthy foods in your diet and doing exercises to build muscle. Where can you learn more? Go to http://rickie-manjit.info/. Enter S176 in the search box to learn more about \"Body Mass Index: Care Instructions. \"  Current as of: October 13, 2016  Content Version: 11.4  © 9802-5441 Healthwise, Incorporated. Care instructions adapted under license by [employer] (which disclaims liability or warranty for this information). If you have questions about a medical condition or this instruction, always ask your healthcare professional. [address] any warranty or liability for your use of this information.

## 2017-11-29 ENCOUNTER — CLINICAL SUPPORT (OUTPATIENT)
Dept: FAMILY MEDICINE CLINIC | Age: 32
End: 2017-11-29

## 2017-11-29 VITALS
SYSTOLIC BLOOD PRESSURE: 130 MMHG | HEART RATE: 85 BPM | WEIGHT: 261.2 LBS | BODY MASS INDEX: 44.59 KG/M2 | HEIGHT: 64 IN | DIASTOLIC BLOOD PRESSURE: 85 MMHG

## 2017-11-29 DIAGNOSIS — E66.01 MORBID OBESITY DUE TO EXCESS CALORIES (HCC): Primary | ICD-10-CM

## 2017-11-29 NOTE — PROGRESS NOTES
Progress Note: Weekly Education Class in the [facility]   Is there anything that you or the patient needs to let the [facility] Physician know about? no  Over the past week, have you experienced any side-effects? no    Demetrio Marroquin is a 34 y.o. female who is enrolled in [facility]    Visit Vitals    /85 (BP 1 Location: Right arm, BP Patient Position: Sitting)    Pulse 85    Ht 5' 4\" (1.626 m)    Wt 261 lb 3.2 oz (118.5 kg)    BMI 44.83 kg/m2     Weight Metrics 11/29/2017 11/16/2017 11/16/2017 11/9/2017 11/8/2017 11/1/2017 10/26/2017   Weight 261 lb 3.2 oz - 263 lb 14.4 oz - 269 lb 6.4 oz 271 lb 12.8 oz -   Waist Measure Inches 53 51 - 52 - 53 55   Exercise Mins/week - 100 - - - - -   Body Fat % - 46.7 - - - - -   BMI 44.83 kg/m2 - 45.3 kg/m2 - 46.24 kg/m2 46.65 kg/m2 -         Have you received any other medical care this week? no  If yes, where and for what? Have you had any change in your medications since your last visit? no  If yes what? Did you have any problems adhering to the program last week? no  If yes, please explain:       Eating Habits Over Last Week:  Did you take in 64 oz of non-caloric fluids?  no     Did you consume your 4 meal replacements each day? no       Physical Activity Over the Past Week:    Aerobic exercise: 0 min  Resistance exercise: 0 workouts / week

## 2017-12-06 ENCOUNTER — CLINICAL SUPPORT (OUTPATIENT)
Dept: FAMILY MEDICINE CLINIC | Age: 32
End: 2017-12-06

## 2017-12-06 VITALS
HEART RATE: 69 BPM | HEIGHT: 64 IN | WEIGHT: 259.4 LBS | DIASTOLIC BLOOD PRESSURE: 69 MMHG | SYSTOLIC BLOOD PRESSURE: 100 MMHG | BODY MASS INDEX: 44.28 KG/M2

## 2017-12-06 DIAGNOSIS — E66.01 MORBID OBESITY DUE TO EXCESS CALORIES (HCC): Primary | ICD-10-CM

## 2017-12-06 NOTE — PROGRESS NOTES
Progress Note: Weekly Education Class in the [facility]   Is there anything that you or the patient needs to let the [address] [facility] Physician know about? no  Over the past week, have you experienced any side-effects? no    Elva Fields is a 34 y.o. female who is enrolled in [facility]    Visit Vitals    /69 (BP 1 Location: Left arm, BP Patient Position: Sitting)    Pulse 69    Ht 5' 4\" (1.626 m)    Wt 259 lb 6.4 oz (117.7 kg)    BMI 44.53 kg/m2     Weight Metrics 12/6/2017 11/29/2017 11/16/2017 11/16/2017 11/9/2017 11/8/2017 11/1/2017   Weight 259 lb 6.4 oz 261 lb 3.2 oz - 263 lb 14.4 oz - 269 lb 6.4 oz 271 lb 12.8 oz   Waist Measure Inches 52 53 51 - 52 - 53   Exercise Mins/week - - 100 - - - -   Body Fat % - - 46.7 - - - -   BMI 44.53 kg/m2 44.83 kg/m2 - 45.3 kg/m2 - 46.24 kg/m2 46.65 kg/m2         Have you received any other medical care this week? no  If yes, where and for what? Have you had any change in your medications since your last visit? no  If yes what? Did you have any problems adhering to the program last week? no  If yes, please explain:       Eating Habits Over Last Week:  Did you take in 64 oz of non-caloric fluids?  no     Did you consume your 4 meal replacements each day? no       Physical Activity Over the Past Week:    Aerobic exercise: 180 min  Resistance exercise: 0 workouts / week

## 2017-12-14 ENCOUNTER — DOCUMENTATION ONLY (OUTPATIENT)
Dept: BARIATRICS/WEIGHT MGMT | Age: 32
End: 2017-12-14

## 2017-12-14 NOTE — PROGRESS NOTES
Medically Supervised Weight Loss Program   Transition to Maintenance      Patients Name: Brad Graves      :  1985          Patient has been in the medically supervised program for 17  weeks. Height: 5 f 4      Starting weight: 307 Current Weight: 259   Goal Weight:            Patient has lost  48  pounds    Current BMI:  44.6    Starting BMI:     Reason for Visit: Transition to Maintenance    Summary:   Patient is currently on 0 New Direction products per day. Food outside of meal replacement includes:      Patient's current activity level is: Patient is doing 20 minutes per day, sometimes more. She goes to PT during her lunch break and walks on a treadmill. Patient is encouraged to keep their daily protein intake around 80 grams per day and keep their daily carbohydrate intake under 100 grams per day. Patient had been educated on the exchange list at least one time during the reducing phase. I spent some time reviewing the exchange list during today's visit. Patient has been educated on the adapting phase and understands the 5 week transition back to food. Patient was  enrolled in the S.T.A.R. Maintenance Program.  Patient understands they are required to attend at least one class per month in order to be an active member in the maintenance program and are required to see the physician 1 x every 3 months. Patient understands they are allowed to purchase 8 boxes of food per month    Patient was given a list of protein shakes and bars that I feel are close in comparison, in terms of protein, calories, and carbohydrates to what her New Direction supplements provide. Safety Weight Zones    Patient's S.T. A. Rting Weight is 259 pounds (goal weight/starting maintenance weight). Green Safety Zone (No more than 2.5% over your S.T. A. Rting weight). - Your GREEN Safety Zone weight is: 261.5    Yellow Caution Zone (2.6%-5% over S.T. A. R ting weight)  - Your YELLOW Caution Zone Weight is: 261.6-264    Red Correction Zone (Weekly average weight is greater than 10% over the S.T. A. R ting weight)  Your RED Correction Zone weight is: 285    Questions that patient has includes:     WOW Movement or Improvement in overall health:       Patient was provided my E-mail address and phone number and may contact me with any nutrition questions.     Nasra Lomas MS RD  12/14/17

## 2017-12-20 ENCOUNTER — DOCUMENTATION ONLY (OUTPATIENT)
Dept: BARIATRICS/WEIGHT MGMT | Age: 32
End: 2017-12-20

## 2017-12-20 NOTE — PROGRESS NOTES
Medically Supervised Weight Loss Program    Star Maintenance Program    Patient's Name: Charma Hamman   Age: 34 y.o. YOB: 1985   Sex: female      Current Weight:  258.4    Last Recorded Weight:      Topic:  In today's STAR Maintenance class, we started off by having an open discussion on things that the patient may be struggling with. Today we worked on how to strengthen their self-esteem through personal reflection on when the client was at his/her best by affirming their personal traits, addressing body image issues, and ways to silence their inner critic. Patient was asked to evaluate things they do well in their daily living, work or skills, personal appearance, relationships, and personality. We talked about ways to improve a better body image and ways to silence the inner critic. We closed the group by having an open discussion. Next Star Maintenance Class will be held on:  Wednesday, January 17, 2018      Leah Roblero MS RD  December 20, 2017

## 2018-01-02 ENCOUNTER — OFFICE VISIT (OUTPATIENT)
Dept: INTERNAL MEDICINE CLINIC | Age: 33
End: 2018-01-02

## 2018-01-02 VITALS
SYSTOLIC BLOOD PRESSURE: 110 MMHG | HEIGHT: 64 IN | TEMPERATURE: 99 F | WEIGHT: 258 LBS | BODY MASS INDEX: 44.05 KG/M2 | RESPIRATION RATE: 14 BRPM | DIASTOLIC BLOOD PRESSURE: 90 MMHG | OXYGEN SATURATION: 99 % | HEART RATE: 56 BPM

## 2018-01-02 DIAGNOSIS — H10.33 ACUTE CONJUNCTIVITIS OF BOTH EYES, UNSPECIFIED ACUTE CONJUNCTIVITIS TYPE: Primary | ICD-10-CM

## 2018-01-02 RX ORDER — GENTAMICIN SULFATE 3 MG/ML
1 SOLUTION/ DROPS OPHTHALMIC EVERY 4 HOURS
Qty: 1 BOTTLE | Refills: 0 | Status: SHIPPED | OUTPATIENT
Start: 2018-01-02 | End: 2018-01-12

## 2018-01-02 NOTE — PROGRESS NOTES
34 y.o. WHITE OR  female who presents for evaluation. She noted onset of pinkeye about 2 days ago, started on the right side but is now moving onto the left. No vision change, photophobia, actual eye pain. In the morning, she has noted some crustiness but not high volume purulence. She does not know who the exposure was but she does work at the  at [employer] Medical History:   Diagnosis Date    HTN (hypertension) 1/27/2011    Morbid obesity due to excess calories ([facility] [address] 8/27/2017    Pap smear 2012    [facility]. Current Outpatient Prescriptions   Medication Sig    NIFEdipine ER (PROCARDIA XL) 90 mg ER tablet TAKE ONE TABLET BY MOUTH ONCE DAILY    lisinopril (PRINIVIL, ZESTRIL) 20 mg tablet TAKE ONE TABLET BY MOUTH ONCE DAILY    levonorgestrel (MIRENA) 20 mcg/24 hr IUD 1 Each by IntraUTERine route once. No current facility-administered medications for this visit. No Known Allergies    Visit Vitals    /90 (BP 1 Location: Left arm, BP Patient Position: Sitting)    Pulse (!) 56    Temp 99 °F (37.2 °C) (Oral)    Resp 14    Ht 5' 4\" (1.626 m)    Wt 258 lb (117 kg)    SpO2 99%    BMI 44.29 kg/m2   tm wnl, sinuses nt, op benign. Injected conjunctiva on the right, less so on left, no obvious purulence    Assessment and plan:  1. Conjunctivitis. Probably viral at this time. continue local care although I did call in garamycin in case sx progress. Call back if she develops eye pain, vision change, photophobia, etc        Above conditions discussed at length and patient vocalized understanding.   All questions answered to patient satisfaction

## 2018-01-02 NOTE — MR AVS SNAPSHOT
Visit Information Date & Time Provider Department Dept. Phone Encounter #  
 1/2/2018  1:30 PM Teresa Lemus MD [facility] [id number] Your Appointments 2/22/2018  4:00 AM  
METABOLIC PROGRAM 15 with Juan Leigh NP [facility] ([facility]) Appt Note: mwl; mwl  
 [address] [facility], [address] [place] [facility] [address] [address]  
  
   
 [address] [facility] [address] Upcoming Health Maintenance Date Due  
 PAP AKA CERVICAL CYTOLOGY 5/1/2015 DTaP/Tdap/Td series (2 - Td) 11/1/2022 Allergies as of 1/2/2018  Review Complete On: 1/2/2018 By: Alton Sears No Known Allergies Current Immunizations  Never Reviewed Name Date Influenza Vaccine 10/3/2017 Tdap 11/1/2012 Not reviewed this visit Vitals BP Pulse Temp Resp Height(growth percentile) Weight(growth percentile) 110/90 (BP 1 Location: Left arm, BP Patient Position: Sitting) (!) 56 99 °F (37.2 °C) (Oral) 14 5' 4\" (1.626 m) 258 lb (117 kg) SpO2 BMI OB Status Smoking Status 99% 44.29 kg/m2 IUD Never Smoker Vitals History BMI and BSA Data Body Mass Index Body Surface Area  
 44.29 kg/m 2 2.3 m 2 Preferred Pharmacy Pharmacy Name Phone [facility] [address] [phone] Your Updated Medication List  
  
   
This list is accurate as of: 1/2/18  1:50 PM.  Always use your most recent med list.  
  
  
  
  
 lisinopril 20 mg tablet Commonly known as:  PRINIVIL, ZESTRIL  
TAKE ONE TABLET BY MOUTH ONCE DAILY MIRENA 20 mcg/24 hr (5 years) Iud  
Generic drug:  levonorgestrel 1 Each by IntraUTERine route once. NIFEdipine ER 90 mg ER tablet Commonly known as:  PROCARDIA XL  
TAKE ONE TABLET BY MOUTH ONCE DAILY [facility]! Dear Beverley Garcia: Thank you for requesting a Shopcaster account. Our records indicate that you already have an active Shopcaster account. You can access your account anytime at [email or web address] Did you know that you can access your hospital and ER discharge instructions at any time in Shopcaster? You can also review all of your test results from your hospital stay or ER visit. Additional Information If you have questions, please visit the Frequently Asked Questions section of the Shopcaster website at [email or web address] Remember, Shopcaster is NOT to be used for urgent needs. For medical emergencies, dial 911. Now available from your iPhone and Android! Please provide this summary of care documentation to your next provider. Your primary care clinician is listed as Shabbir Mcmahon. If you have any questions after today's visit, please call [phone].

## 2018-01-02 NOTE — PROGRESS NOTES
1. Have you been to the ER, urgent care clinic or hospitalized since your last visit? NO.     2. Have you seen or consulted any other health care providers outside of the [address] [place] [address] since your last visit (Include any pap smears or colon screening)? NO      Do you have an Advanced Directive? NO    Would you like information on Advanced Directives?  NO

## 2018-01-18 NOTE — PROGRESS NOTES
Discharge Planning Assessment  [facility]     Patient Name: Sultana Siegel  MRN: [id number]  Today's Date: 1/18/2018    Admit Date: 1/17/2018          Discharge Needs Assessment       01/18/18 1234    Living Environment    Lives With alone    Living Arrangements apartment    Home Accessibility no concerns    Type of Financial/Environmental Concern none    Transportation Available car    Living Environment Comment CM spoke with pt in room with permission in regards to discharge planning. Pt resides in an apartment alone in [place] [facility]. Pt is independent of ADLs. Pt states parents live in town and very supportive and will help her.     Living Environment    Provides Primary Care For no one    Quality Of Family Relationships supportive    Able to Return to Prior Living Arrangements yes    Living Arrangement Comments Plan is to return home when medically ready for discharge.     Discharge Needs Assessment    Concerns To Be Addressed denies needs/concerns at this time    Readmission Within The Last 30 Days no previous admission in last 30 days    Anticipated Changes Related to Illness other (see comments)   Denies discharge needs at this time.    Equipment Currently Used at Home none    Equipment Needed After Discharge none    Discharge Disposition still a patient    Discharge Contact Information if Applicable Han Siegel(father):  [phone]    Discharge Planning Comments Pt has [place] [employer] insurance and denies recent changes in insurance. Pt states her prescriptions require a copay, but copay is affordable. Pt uses [facility] in [place] [facility]. Plan is home when medically ready for discharge. Pt states parents are supportive and will help her. Pt denies discharge needs.  CM will cont to follow            Discharge Plan       01/18/18 1241    Case Management/Social Work Plan    Plan discharge plan    Patient/Family In Agreement With Plan yes    Additional Comments Plan is home with spouse when  Progress Note: Weekly Education Class in the [facility] Weight Loss Program   Is there anything that you or the patient needs to let the [address] [facility] Physician know about? no  Over the past week, have you experienced any side-effects? no    Mervin Blanco is a 34 y.o. female who is enrolled in [facility] Loss Program    Visit Vitals    /81 (BP 1 Location: Right arm, BP Patient Position: Sitting)    Pulse 81    Ht 5' 4\" (1.626 m)    Wt 295 lb 12.8 oz (134.2 kg)    BMI 50.77 kg/m2     Weight Metrics 9/6/2017 9/6/2017 8/30/2017 8/23/2017 8/17/2017 8/17/2017 7/25/2017   Weight - 295 lb 12.8 oz 299 lb 303 lb 6.4 oz - 307 lb 3.2 oz 312 lb 3.2 oz   Waist Measure Inches 57 - 58 58 57 - -   Exercise Mins/week - - - - 0 - -   Body Fat % - - - - 50 - -   BMI - 50.77 kg/m2 51.32 kg/m2 52.08 kg/m2 - 52.73 kg/m2 53.59 kg/m2         Have you received any other medical care this week? no  If yes, where and for what? Have you had any change in your medications since your last visit? no  If yes what? Did you have any problems adhering to the program last week? no  If yes, please explain:       Eating Habits Over Last Week:  Did you take in 64 oz of non-caloric fluids?  no     Did you consume your 4 meal replacements each day? no       Physical Activity Over the Past Week:    Aerobic exercise: 100 min  Resistance exercise: 0 workouts / week medically ready for dischrge. Pt states parents will help her if needed. Pt denies discharge needs. CM will cont to follow        Discharge Placement     No information found        Expected Discharge Date and Time     Expected Discharge Date Expected Discharge Time    Jan 21, 2018               Demographic Summary       01/18/18 1233    Primary Care Physician Information    Name Lori Almanzar            Functional Status       01/18/18 1234    Functional Status Prior    Ambulation 0-->independent    Transferring 0-->independent    Toileting 0-->independent    Bathing 0-->independent    Dressing 0-->independent    Eating 0-->independent    Communication 0-->understands/communicates without difficulty    Swallowing 0-->swallows foods/liquids without difficulty            Psychosocial     None            Abuse/Neglect     None            Legal     None            Substance Abuse     None            Patient Forms     None          Ashlee Hanson RN

## 2018-01-31 ENCOUNTER — DOCUMENTATION ONLY (OUTPATIENT)
Dept: BARIATRICS/WEIGHT MGMT | Age: 33
End: 2018-01-31

## 2018-01-31 NOTE — PROGRESS NOTES
Medically Supervised Weight Loss Program        Patient's Name: Verna Valdes   Age: 34 y.o. YOB: 1985   Sex: female      Current Weight:  256.8    Patient is in the STAR Maintenance Class. Patient attended the weekly class facilitated by Registered Dietitian.   Topic discussed was: Dining Out and Alcohol    Jorge Servin MS RD  [unfilled]

## 2018-02-22 ENCOUNTER — OFFICE VISIT (OUTPATIENT)
Dept: INTERNAL MEDICINE CLINIC | Age: 33
End: 2018-02-22

## 2018-02-22 VITALS
TEMPERATURE: 97.1 F | HEIGHT: 64 IN | SYSTOLIC BLOOD PRESSURE: 110 MMHG | BODY MASS INDEX: 43.72 KG/M2 | RESPIRATION RATE: 20 BRPM | DIASTOLIC BLOOD PRESSURE: 73 MMHG | HEART RATE: 80 BPM | WEIGHT: 256.1 LBS | OXYGEN SATURATION: 99 %

## 2018-02-22 DIAGNOSIS — I10 ESSENTIAL HYPERTENSION: ICD-10-CM

## 2018-02-22 DIAGNOSIS — R73.03 PRE-DIABETES: ICD-10-CM

## 2018-02-22 DIAGNOSIS — R63.4 RAPID WEIGHT LOSS: ICD-10-CM

## 2018-02-22 DIAGNOSIS — E66.01 MORBID OBESITY DUE TO EXCESS CALORIES (HCC): Primary | ICD-10-CM

## 2018-02-22 NOTE — MR AVS SNAPSHOT
[address] 
[phone] Patient: Carroll Carroll MRN: [id number] FRS:[birth year] Visit Information Date & Time Provider Department Dept. Phone Encounter #  
 2/22/2018  8:15 AM Rajesh Leyva NP [facility] [phone] [id number] Follow-up Instructions Return in about 4 weeks (around 3/22/2018). Follow-up and Disposition History Upcoming Health Maintenance Date Due  
 PAP AKA CERVICAL CYTOLOGY 5/1/2015 DTaP/Tdap/Td series (2 - Td) 11/1/2022 Allergies as of 2/22/2018  Review Complete On: 2/22/2018 By: Rajesh Leyva NP No Known Allergies Current Immunizations  Never Reviewed Name Date Influenza Vaccine 10/3/2017 Tdap 11/1/2012 Not reviewed this visit You Were Diagnosed With   
  
 Codes Comments Mor[facility]    -  Primary ICD-10-CM: E66.01 
ICD-9-CM: 278.01 Pre-diabetes     ICD-10-CM: R73.03 
ICD-9-CM: 790.29 Essential hypertension     ICD-10-CM: I10 
ICD-9-CM: 401.9 Rapid weight loss     ICD-10-CM: R63.4 ICD-9-CM: 783.21 Vitals BP Pulse Temp Resp Height(growth percentile) Weight(growth percentile) 110/73 (BP 1 Location: Left arm, BP Patient Position: Sitting) 80 97.1 °F (36.2 °C) (Oral) 20 5' 4\" (1.626 m) 256 lb 1.6 oz (116.2 kg) SpO2 BMI OB Status Smoking Status 99% 43.96 kg/m2 IUD Never Smoker BMI and BSA Data Body Mass Index Body Surface Area 43.96 kg/m 2 2.29 m 2 Preferred Pharmacy Pharmacy Name Phone [address] [phone] Your Updated Medication List  
  
   
This list is accurate as of 2/22/18  9:28 AM.  Always use your most recent med list.  
  
  
  
  
 lisinopril 20 mg tablet Commonly known as:  PRINIVIL, ZESTRIL  
TAKE ONE TABLET BY MOUTH ONCE DAILY MIRENA 20 mcg/24 hr (5 years) Iud  
Generic drug:  levonorgestrel 1 Each by IntraUTERine route once. NIFEdipine ER 90 mg ER tablet Commonly known as:  PROCARDIA XL  
TAKE ONE TABLET BY MOUTH ONCE DAILY We Performed the Following AMB POC EKG ROUTINE W/ 12 LEADS, INTER & REP [85701 CPT(R)] AMB POC EKG ROUTINE W/ 12 LEADS, INTER & REP [77395 CPT(R)] Follow-up Instructions Return in about 4 weeks (around 3/22/2018). Patient Instructions Health Maintenance Due Topic Date Due  
 PAP AKA CERVICAL CYTOLOGY  05/01/2015 Monthly goasl: 
 
1lb/week so next visit about 10 lbs Restart exercise constistantly Body Mass Index: Care Instructions Your Care Instructions Body mass index (BMI) can help you see if your weight is raising your risk for health problems. It uses a formula to compare how much you weigh with how tall you are. · A BMI lower than 18.5 is considered underweight. · A BMI between 18.5 and 24.9 is considered healthy. · A BMI between 25 and 29.9 is considered overweight. A BMI of 30 or higher is considered obese. If your BMI is in the normal range, it means that you have a lower risk for weight-related health problems. If your BMI is in the overweight or obese range, you may be at increased risk for weight-related health problems, such as high blood pressure, heart disease, stroke, arthritis or joint pain, and diabetes. If your BMI is in the underweight range, you may be at increased risk for health problems such as fatigue, lower protection (immunity) against illness, muscle loss, bone loss, hair loss, and hormone problems. BMI is just one measure of your risk for weight-related health problems. You may be at higher risk for health problems if you are not active, you eat an unhealthy diet, or you drink too much alcohol or use tobacco products. Follow-up care is a key part of your treatment and safety.  Be sure to make and go to all appointments, and call your doctor if you are having problems. It's also a good idea to know your test results and keep a list of the medicines you take. How can you care for yourself at home? · Practice healthy eating habits. This includes eating plenty of fruits, vegetables, whole grains, lean protein, and low-fat dairy. · If your doctor recommends it, get more exercise. Walking is a good choice. Bit by bit, increase the amount you walk every day. Try for at least 30 minutes on most days of the week. · Do not smoke. Smoking can increase your risk for health problems. If you need help quitting, talk to your doctor about stop-smoking programs and medicines. These can increase your chances of quitting for good. · Limit alcohol to 2 drinks a day for men and 1 drink a day for women. Too much alcohol can cause health problems. If you have a BMI higher than 25 · Your doctor may do other tests to check your risk for weight-related health problems. This may include measuring the distance around your waist. A waist measurement of more than 40 inches in men or 35 inches in women can increase the risk of weight-related health problems. · Talk with your doctor about steps you can take to stay healthy or improve your health. You may need to make lifestyle changes to lose weight and stay healthy, such as changing your diet and getting regular exercise. If you have a BMI lower than 18.5 · Your doctor may do other tests to check your risk for health problems. · Talk with your doctor about steps you can take to stay healthy or improve your health. You may need to make lifestyle changes to gain or maintain weight and stay healthy, such as getting more healthy foods in your diet and doing exercises to build muscle. Where can you learn more? Go to http://rickie-manjit.info/. Enter S176 in the search box to learn more about \"Body Mass Index: Care Instructions. \" 
 Current as of: October 13, 2016 Content Version: 11.4 © 4591-0397 Healthwise, XDx. Care instructions adapted under license by [employer]al[employer]ains (which disclaims liability or warranty for this information). If you have questions about a medical condition or this instruction, always ask your healthcare professional. [address] any warranty or liability for your use of this information. Patient Instructions History Introducing [facility]! Dear Mia Oliveira: Thank you for requesting a Dokogeo account. Our records indicate that you already have an active Dokogeo account. You can access your account anytime at [email or web address] Did you know that you can access your hospital and ER discharge instructions at any time in Dokogeo? You can also review all of your test results from your hospital stay or ER visit. Additional Information If you have questions, please visit the Frequently Asked Questions section of the Dokogeo website at [email or web address] Remember, Dokogeo is NOT to be used for urgent needs. For medical emergencies, dial 911. Now available from your iPhone and Android! Please provide this summary of care documentation to your next provider. Your primary care clinician is listed as Zully Rodrigues. If you have any questions after today's visit, please call [phone].

## 2018-02-22 NOTE — PATIENT INSTRUCTIONS
Health Maintenance Due   Topic Date Due    PAP AKA CERVICAL CYTOLOGY  05/01/2015     Monthly goasl:    1lb/week so next visit about 10 lbs  Restart exercise constistantly     Body Mass Index: Care Instructions  Your Care Instructions    Body mass index (BMI) can help you see if your weight is raising your risk for health problems. It uses a formula to compare how much you weigh with how tall you are. · A BMI lower than 18.5 is considered underweight. · A BMI between 18.5 and 24.9 is considered healthy. · A BMI between 25 and 29.9 is considered overweight. A BMI of 30 or higher is considered obese. If your BMI is in the normal range, it means that you have a lower risk for weight-related health problems. If your BMI is in the overweight or obese range, you may be at increased risk for weight-related health problems, such as high blood pressure, heart disease, stroke, arthritis or joint pain, and diabetes. If your BMI is in the underweight range, you may be at increased risk for health problems such as fatigue, lower protection (immunity) against illness, muscle loss, bone loss, hair loss, and hormone problems. BMI is just one measure of your risk for weight-related health problems. You may be at higher risk for health problems if you are not active, you eat an unhealthy diet, or you drink too much alcohol or use tobacco products. Follow-up care is a key part of your treatment and safety. Be sure to make and go to all appointments, and call your doctor if you are having problems. It's also a good idea to know your test results and keep a list of the medicines you take. How can you care for yourself at home? · Practice healthy eating habits. This includes eating plenty of fruits, vegetables, whole grains, lean protein, and low-fat dairy. · If your doctor recommends it, get more exercise. Walking is a good choice. Bit by bit, increase the amount you walk every day.  Try for at least 30 minutes on most days of the week. · Do not smoke. Smoking can increase your risk for health problems. If you need help quitting, talk to your doctor about stop-smoking programs and medicines. These can increase your chances of quitting for good. · Limit alcohol to 2 drinks a day for men and 1 drink a day for women. Too much alcohol can cause health problems. If you have a BMI higher than 25  · Your doctor may do other tests to check your risk for weight-related health problems. This may include measuring the distance around your waist. A waist measurement of more than 40 inches in men or 35 inches in women can increase the risk of weight-related health problems. · Talk with your doctor about steps you can take to stay healthy or improve your health. You may need to make lifestyle changes to lose weight and stay healthy, such as changing your diet and getting regular exercise. If you have a BMI lower than 18.5  · Your doctor may do other tests to check your risk for health problems. · Talk with your doctor about steps you can take to stay healthy or improve your health. You may need to make lifestyle changes to gain or maintain weight and stay healthy, such as getting more healthy foods in your diet and doing exercises to build muscle. Where can you learn more? Go to http://rickie-manjit.info/. Enter S176 in the search box to learn more about \"Body Mass Index: Care Instructions. \"  Current as of: October 13, 2016  Content Version: 11.4  © 5522-6388 Healthwise, Incorporated. Care instructions adapted under license by Oncov[employer] (which disclaims liability or warranty for this information). If you have questions about a medical condition or this instruction, always ask your healthcare professional. [address] any warranty or liability for your use of this information.

## 2018-02-22 NOTE — PROGRESS NOTES
Chief Complaint   Patient presents with    Weight Management     Star Maintenance      1. Have you been to the ER, urgent care clinic since your last visit? Hospitalized since your last visit? No    2. Have you seen or consulted any other health care providers outside of the [address] [place] [address] since your last visit? Include any pap smears or colon screening.  No

## 2018-02-22 NOTE — PROGRESS NOTES
[facility] Progress Note:   F/up Physician Visit    CC: La Hook is a 34 y.o. female who is here for her  f/up physician visit for the Maintenance Program. Gregory Halsted has completed 6 weeks on the [facility] program to date. 7 lbs weight loss since last visit.  Starting to exercise. 51 lbs weight loss!      Starting weight 307.2 lb Body mass index is 52.73 kg/(m^2). Current weight: 256 lbs  Goal weight 180 lb       lbs, next due 206   lbs    Weight Metrics 2/22/2018 1/2/2018 12/6/2017 11/29/2017 11/16/2017 11/16/2017 11/9/2017   Weight 256 lb 1.6 oz 258 lb 259 lb 6.4 oz 261 lb 3.2 oz - 263 lb 14.4 oz -   Waist Measure Inches 51 - 52 53 51 - 52   Exercise Mins/week 0 - - - 100 - -   Body Fat % 46 - - - 46.7 - -   BMI 43.96 kg/m2 44.29 kg/m2 44.53 kg/m2 44.83 kg/m2 - 45.3 kg/m2 -         Current Outpatient Prescriptions   Medication Sig Dispense Refill    NIFEdipine ER (PROCARDIA XL) 90 mg ER tablet TAKE ONE TABLET BY MOUTH ONCE DAILY 90 Tab 1    lisinopril (PRINIVIL, ZESTRIL) 20 mg tablet TAKE ONE TABLET BY MOUTH ONCE DAILY 90 Tab 1    levonorgestrel (MIRENA) 20 mcg/24 hr IUD 1 Each by IntraUTERine route once. Participation   Did you attend clinic and class last week? no    Review of Systems  Since your last visit, have you experienced any complications? no  If yes, please list:     No CP, SOB, palpitations, lightheadedness, dizziness, constipation. Positives highlight in BOLD. Are you taking an appetite suppressant? no  If so, is there any Chest Pain, Palpitations or Dizziness? BP Readings from Last 10 Encounters:   02/22/18 110/73   01/02/18 110/90   12/06/17 100/69   11/29/17 130/85   11/16/17 119/69   11/09/17 147/89   11/01/17 122/86   10/26/17 124/78   10/19/17 111/68   10/11/17 126/84         Have you received any other medical care this week? no  If yes, where and for what?        Have you discontinued or changed any medicine or dose of your medicine since your last visit? no  If yes, where and for what? Diet  How many ounces of calorie-free liquids did you consume each day? 48-50 oz    How many meal replacements did you take each day? 0    Did you have any problems adhering to the program?  no If yes, please explain:       Exercise  Aerobic exercise: 0 min  Resistance exercise: 0 workouts / week  Any discomfort?  no     If yes, where? Review of Systems  Complete ROS negative except where noted above    Objective  Visit Vitals    /73 (BP 1 Location: Left arm, BP Patient Position: Sitting)    Pulse 80    Temp 97.1 °F (36.2 °C) (Oral)    Resp 20    Ht 5' 4\" (1.626 m)    Wt 256 lb 1.6 oz (116.2 kg)    SpO2 99%    BMI 43.96 kg/m2     No LMP recorded. Patient is not currently having periods (Reason: IUD). PHQ over the last two weeks 1/2/2018   Little interest or pleasure in doing things Not at all   Feeling down, depressed or hopeless Not at all   Total Score PHQ 2 0         Waist Circumference: I personally reviewed patient's Weight Management Doc Flowsheet  Neck Circumference: I personally reviewed patient's Weight Management Doc Flowsheet  Percent Body Fat: I personally reviewed patient's Weight Management Doc Flowsheet    Physical Exam  Appearance: well appearing, obese, A&O, NAD  HEENT:  NC/AT, PERRL, No scleral icterus  Heart:  RRR without M/R/G  Lungs:  CTAB, no rhonchi, rales, or wheezes with good air exchange   Ext:  No LE Edema    Assessment / Plan    Encounter Diagnoses   Name Primary?  Morbid obesity due to excess calories (HCC) Yes    Pre-diabetes     Essential hypertension     Rapid weight loss        1. Weight management well controlled   Progress was reviewed with patient    2. Labs    Latest results reviewed with patient   Lab slip given to pt for f/up HDL labs    3.  Diet regimen   # of meal replacements prescribed: 1-2 trying to hit 4738-2577 calories/day   If modified LCD-nutritional guidelines:    Monthly Goal   As below    Medical monitoring schedule:   monthly BP/Weight checks   Every 3 Monthly provider appointments    I have reviewed/discussed the above normal BMI with the patient. I have recommended the following interventions: dietary management education, guidance, and counseling, encourage exercise and prescribed dietary intake . Kimani Abarca Ms. Alisha Brush has a reminder for a \"due or due soon\" health maintenance. I have asked that she contact her primary care provider for follow-up on this health maintenance. Patient Instructions     Health Maintenance Due   Topic Date Due    PAP AKA CERVICAL CYTOLOGY  05/01/2015     Monthly goasl:    1lb/week so next visit about 10 lbs  Restart exercise constistantly     Body Mass Index: Care Instructions  Your Care Instructions    Body mass index (BMI) can help you see if your weight is raising your risk for health problems. It uses a formula to compare how much you weigh with how tall you are. · A BMI lower than 18.5 is considered underweight. · A BMI between 18.5 and 24.9 is considered healthy. · A BMI between 25 and 29.9 is considered overweight. A BMI of 30 or higher is considered obese. If your BMI is in the normal range, it means that you have a lower risk for weight-related health problems. If your BMI is in the overweight or obese range, you may be at increased risk for weight-related health problems, such as high blood pressure, heart disease, stroke, arthritis or joint pain, and diabetes. If your BMI is in the underweight range, you may be at increased risk for health problems such as fatigue, lower protection (immunity) against illness, muscle loss, bone loss, hair loss, and hormone problems. BMI is just one measure of your risk for weight-related health problems. You may be at higher risk for health problems if you are not active, you eat an unhealthy diet, or you drink too much alcohol or use tobacco products.   Follow-up care is a key part of your treatment and safety. Be sure to make and go to all appointments, and call your doctor if you are having problems. It's also a good idea to know your test results and keep a list of the medicines you take. How can you care for yourself at home? · Practice healthy eating habits. This includes eating plenty of fruits, vegetables, whole grains, lean protein, and low-fat dairy. · If your doctor recommends it, get more exercise. Walking is a good choice. Bit by bit, increase the amount you walk every day. Try for at least 30 minutes on most days of the week. · Do not smoke. Smoking can increase your risk for health problems. If you need help quitting, talk to your doctor about stop-smoking programs and medicines. These can increase your chances of quitting for good. · Limit alcohol to 2 drinks a day for men and 1 drink a day for women. Too much alcohol can cause health problems. If you have a BMI higher than 25  · Your doctor may do other tests to check your risk for weight-related health problems. This may include measuring the distance around your waist. A waist measurement of more than 40 inches in men or 35 inches in women can increase the risk of weight-related health problems. · Talk with your doctor about steps you can take to stay healthy or improve your health. You may need to make lifestyle changes to lose weight and stay healthy, such as changing your diet and getting regular exercise. If you have a BMI lower than 18.5  · Your doctor may do other tests to check your risk for health problems. · Talk with your doctor about steps you can take to stay healthy or improve your health. You may need to make lifestyle changes to gain or maintain weight and stay healthy, such as getting more healthy foods in your diet and doing exercises to build muscle. Where can you learn more? Go to http://rickie-manjit.info/.   Enter S176 in the search box to learn more about \"Body Mass Index: Care Instructions. \"  Current as of: 2016  Content Version: 11.4  © 2012-5784 [employer]. Care instructions adapted under license by [employer] (which disclaims liability or warranty for this information). If you have questions about a medical condition or this instruction, always ask your healthcare professional. [place] [address] any warranty or liability for your use of this information. Follow-up Disposition:  Return in about 4 weeks (around 3/22/2018). 10 minutes of the 15 minutes face to face time with Dalton Colunga consisted of counseling & coordinating and/or discussing treatment plans in reference to her The primary encounter diagnosis was Morbid obesity due to excess calories ([facility] [address] Diagnoses of Pre-diabetes, Essential hypertension, and Rapid weight loss were also pertinent to this visit. The patient is to follow up as scheduled and will report to the ED or the office if symptoms change or increase. The patient has voiced understanding and will comply.

## 2018-02-28 ENCOUNTER — DOCUMENTATION ONLY (OUTPATIENT)
Dept: BARIATRICS/WEIGHT MGMT | Age: 33
End: 2018-02-28

## 2018-02-28 NOTE — PROGRESS NOTES
Medically Supervised Weight Loss Program        Patient's Name: Daniel Pagan   Age: 34 y.o. YOB: 1985   Sex: female      Current Weight:  264.0    Patient is in the [facility] Maintenance Class. Patient attended the weekly class facilitated by Registered Dietitian.   Topic discussed was: [employer], Main Meals    Leola Jamison [address]  [unfilled]

## 2018-04-25 ENCOUNTER — OFFICE VISIT (OUTPATIENT)
Dept: INTERNAL MEDICINE CLINIC | Age: 33
End: 2018-04-25

## 2018-04-25 VITALS
OXYGEN SATURATION: 97 % | DIASTOLIC BLOOD PRESSURE: 86 MMHG | RESPIRATION RATE: 18 BRPM | HEIGHT: 64 IN | SYSTOLIC BLOOD PRESSURE: 129 MMHG | BODY MASS INDEX: 43.71 KG/M2 | WEIGHT: 256 LBS | TEMPERATURE: 99.3 F | HEART RATE: 87 BPM

## 2018-04-25 DIAGNOSIS — J02.9 SORE THROAT: Primary | ICD-10-CM

## 2018-04-25 LAB
S PYO AG THROAT QL: NEGATIVE
VALID INTERNAL CONTROL?: YES

## 2018-04-25 NOTE — PROGRESS NOTES
1. Have you been to the ER, urgent care clinic since your last visit? Hospitalized since your last visit? No    2. Have you seen or consulted any other health care providers outside of the [address] [place] [address] since your last visit? Include any pap smears or colon screening.  No

## 2018-04-25 NOTE — PROGRESS NOTES
[facility] OF [facility]:  4/25/2018, MRN: [id number]      Marilu Babcock is a 34 y.o. female and presents to clinic for Sore Throat (with fever yesterday)    Subjective: The patient is a 39-year-old female with history of obesity, prediabetes, and hypertension. Viral URI: She has sx of sore throat beginning Monday night. She has associated myalgias. No cough or SOB. She had subjective fever/chills. Her son had similar sx. No ear pain or eye pain. Sx have resolved except for her sore throat. Her rapid strep test is negative today. She has been taking Motrin off and on for sore throat symptoms. Patient Active Problem List    Diagnosis Date Noted    Morbid obesity due to excess calories ([address] 08/27/2017    Pre-diabetes 12/24/2015    HTN (hypertension) 01/27/2011       Current Outpatient Prescriptions   Medication Sig Dispense Refill    NIFEdipine ER (PROCARDIA XL) 90 mg ER tablet TAKE ONE TABLET BY MOUTH ONCE DAILY 90 Tab 1    lisinopril (PRINIVIL, ZESTRIL) 20 mg tablet TAKE ONE TABLET BY MOUTH ONCE DAILY 90 Tab 1    levonorgestrel (MIRENA) 20 mcg/24 hr IUD 1 Each by IntraUTERine route once. No Known Allergies    Past Medical History:   Diagnosis Date    HTN (hypertension) 1/27/2011    Morbid obesity due to excess calories ([address] 8/27/2017    Pap smear 2012    total care for women.         Past Surgical History:   Procedure Laterality Date    HX HEENT  2 months old    tear duct stenosis    HX UROLOGICAL      one kidney a little small, but OK with maturation       Family History   Problem Relation Age of Onset    Hypertension Mother     Elevated Lipids Mother     Hypertension Maternal Grandmother     Elevated Lipids Maternal Grandmother     Stroke Maternal Grandmother     Hypertension Maternal Grandfather     Elevated Lipids Maternal Grandfather     Heart Disease Maternal Grandfather     Hypertension Paternal Grandmother     Elevated Lipids Paternal Grandmother     Hypertension Paternal Grandfather     Elevated Lipids Paternal Grandfather     Cancer Maternal Aunt      leukemia    Hypertension Paternal Aunt        Social History   Substance Use Topics    Smoking status: Never Smoker    Smokeless tobacco: Never Used    Alcohol use 0.0 oz/week     0 Standard drinks or equivalent per week      Comment: occasionally, socially       ROS   Review of Systems   Constitutional: Positive for chills and fever. HENT: Positive for sore throat. Negative for ear pain. Eyes: Negative for blurred vision and pain. Respiratory: Negative for cough and shortness of breath. Cardiovascular: Negative for chest pain. Gastrointestinal: Negative for abdominal pain, blood in stool and melena. Genitourinary: Negative for dysuria and hematuria. Musculoskeletal: Negative for joint pain and myalgias. Skin: Negative for rash. Neurological: Negative for tingling, focal weakness and headaches. Endo/Heme/Allergies: Does not bruise/bleed easily. Psychiatric/Behavioral: Negative for substance abuse. Objective     Vitals:    04/25/18 1209   BP: 129/86   Pulse: 87   Resp: 18   Temp: 99.3 °F (37.4 °C)   SpO2: 97%   Weight: 256 lb (116.1 kg)   Height: 5' 4\" (1.626 m)   PainSc:   0 - No pain       Physical Exam   Constitutional: She is oriented to person, place, and time and well-developed, well-nourished, and in no distress. HENT:   Head: Normocephalic and atraumatic. Right Ear: External ear normal.   Left Ear: External ear normal.   Nose: Nose normal.   Mouth/Throat: No oropharyngeal exudate. Clear TMs. Slightly erythematous posterior oropharynx. Eyes: Conjunctivae and EOM are normal. Right eye exhibits no discharge. Left eye exhibits no discharge. No scleral icterus. Neck: Neck supple. Cardiovascular: Normal rate, regular rhythm, normal heart sounds and intact distal pulses. Exam reveals no gallop and no friction rub. No murmur heard.   Pulmonary/Chest: Effort normal and breath sounds normal. No respiratory distress. She has no wheezes. She has no rales. Abdominal: Soft. Bowel sounds are normal. She exhibits no distension. There is no tenderness. There is no rebound and no guarding. No organomegaly   Musculoskeletal: She exhibits no edema or tenderness (BUE). Lymphadenopathy:     She has no cervical adenopathy. Neurological: She is alert and oriented to person, place, and time. She exhibits normal muscle tone. Gait normal.   Skin: Skin is warm and dry. No erythema. Psychiatric: Affect normal.   Nursing note and vitals reviewed. LABS   Data Review:   No results found for: WBC, WBCLT, HGBPOC, HGB, HGBP, HCTPOC, HCT, PHCT, RBCH, PLT, MCV, HGBEXT, HCTEXT, PLTEXT, HGBEXT, HCTEXT, PLTEXT    Lab Results   Component Value Date/Time    Sodium 140 08/04/2017 11:08 PM    Potassium 5.4 08/04/2017 11:08 PM    Chloride 102 08/04/2017 11:08 PM    CO2 25 08/04/2017 11:08 PM    Anion gap 13.0 08/04/2017 11:08 PM    Glucose 99 08/04/2017 11:08 PM    BUN 14 08/04/2017 11:08 PM    Creatinine 0.8 08/04/2017 11:08 PM    BUN/Creatinine ratio 20 04/19/2013 09:35 AM    GFR est AA >60 04/19/2013 09:35 AM    GFR est non-AA >60 04/19/2013 09:35 AM    Calcium 8.8 08/04/2017 11:08 PM       Lab Results   Component Value Date/Time    Cholesterol, total 154 02/21/2011 11:15 AM    HDL Cholesterol 47 02/21/2011 11:15 AM    LDL, calculated 88 02/21/2011 11:15 AM    VLDL, calculated 19 02/21/2011 11:15 AM    Triglyceride 97 02/21/2011 11:15 AM       Lab Results   Component Value Date/Time    Hemoglobin A1c 5.7 08/04/2017 11:08 PM    Hemoglobin A1c (POC) 5.8 12/24/2015 11:42 AM       Assessment/Plan:   1. Viral URI: PE findings are reassuring. Her rapid strep test is negative.  -Rest.  Hydration. Motrin and Tylenol were recommended for symptomatically relief of underlying pharyngitis. -Return to clinic if symptoms worsen.  -A work note was given to the patient today. ORDERS:  - AMB POC RAPID STREP A    2.   [facility] maintenance:  -Requesting her last Pap smear. Health Maintenance Due   Topic Date Due    PAP AKA CERVICAL CYTOLOGY  05/01/2015     Lab review: labs are reviewed in the EHR    I have discussed the diagnosis with the patient and the intended plan as seen in the above orders. The patient has received an after-visit summary and questions were answered concerning future plans. I have discussed medication side effects and warnings with the patient as well. I have reviewed the plan of care with the patient, accepted their input and they are in agreement with the treatment goals. All questions were answered. The patient understands the plan of care. Handouts provided today with above information. Pt instructed if symptoms worsen to call the office or report to the ED for continued care. Greater than 50% of the visit time was spent in counseling and/or coordination of care. Follow-up Disposition:  Return if symptoms worsen or fail to improve.     Tarik Zee MD

## 2018-04-25 NOTE — LETTER
NOTIFICATION OF RETURN TO WORK / SCHOOL 
 
4/25/2018 Ms. Favio Eric [address] [facility] [address] To Whom It May Concern: 
 
Favio Eric is under medical care. Please excuse her absence from work beginning 4/24/18. She can return to work on 4/26/18 with regular duties and/or activities . If there are questions or concerns please have the patient contact our office.  
 
 
 
Sincerely, 
 
 
 
 
 
 
Antonio Marcelo MD

## 2018-07-04 DIAGNOSIS — I10 ESSENTIAL HYPERTENSION: ICD-10-CM

## 2018-07-04 DIAGNOSIS — E66.01 OBESITY, MORBID, BMI 50 OR HIGHER (HCC): ICD-10-CM

## 2018-07-05 NOTE — TELEPHONE ENCOUNTER
From: Tino Adams  To: LOLA Lynn  Sent: 7/4/2018 9:30 AM EDT  Subject: Medication Renewal Request    Original authorizing provider: LOLA Lynn    Rachel Shima Jackson would like a refill of the following medications:  NIFEdipine ER (PROCARDIA XL) 90 mg ER tablet [LOLA Hurt]  lisinopril (PRINIVIL, ZESTRIL) 20 mg tablet [LOLA Hurt]    [facility] [address]    Comment:

## 2018-07-06 RX ORDER — NIFEDIPINE 90 MG/1
TABLET, EXTENDED RELEASE ORAL
Qty: 90 TAB | Refills: 1 | Status: SHIPPED | OUTPATIENT
Start: 2018-07-06 | End: 2019-01-16 | Stop reason: SDUPTHER

## 2018-07-06 RX ORDER — LISINOPRIL 20 MG/1
TABLET ORAL
Qty: 90 TAB | Refills: 1 | Status: SHIPPED | OUTPATIENT
Start: 2018-07-06 | End: 2019-01-16 | Stop reason: SDUPTHER

## 2019-01-15 ENCOUNTER — HOSPITAL ENCOUNTER (EMERGENCY)
Age: 34
Discharge: HOME | End: 2019-01-15
Attending: EMERGENCY MEDICINE
Payer: COMMERCIAL

## 2019-01-15 VITALS
WEIGHT: 280 LBS | DIASTOLIC BLOOD PRESSURE: 93 MMHG | TEMPERATURE: 98.4 F | OXYGEN SATURATION: 100 % | HEART RATE: 89 BPM | BODY MASS INDEX: 48.06 KG/M2 | RESPIRATION RATE: 16 BRPM | SYSTOLIC BLOOD PRESSURE: 148 MMHG

## 2019-01-15 DIAGNOSIS — H54.7 VISION PROBLEM: ICD-10-CM

## 2019-01-15 DIAGNOSIS — I10 ESSENTIAL HYPERTENSION: Primary | ICD-10-CM

## 2019-01-15 PROCEDURE — 99283 EMERGENCY DEPT VISIT LOW MDM: CPT

## 2019-01-15 RX ORDER — LISINOPRIL 20 MG/1
20 TABLET ORAL DAILY
Qty: 14 TAB | Refills: 0 | Status: SHIPPED | OUTPATIENT
Start: 2019-01-15 | End: 2019-01-16 | Stop reason: SDUPTHER

## 2019-01-15 NOTE — ED TRIAGE NOTES
Pt C/O HA pain when she got up this am with intermiittent Lt eye blurriness x 2 weeks. Pt has not had her HBP since October.

## 2019-01-15 NOTE — ED PROVIDER NOTES
EMERGENCY DEPARTMENT HISTORY AND PHYSICAL EXAM 
 
11:50 AM 
 
 
Date: 1/15/2019 Patient Name: Trenton Rg History of Presenting Illness Chief Complaint Patient presents with  
 Headache  Blurred Vision History Provided By: Patient Chief Complaint: Headache Duration: \"this morning\" Hours Timing:  Acute Location: [place] area Quality: Aching Severity: Moderate Modifying Factors: Tried Ibuprofen Associated Symptoms: left eye visual disturbance Additional History (Context): Trenton Rg is a 28 y.o. female with PMHx of HTN and morbid obesity presents c/o an acute onset of an aching moderate headache in her temple area that started \"this morning\". Pt tried Ibuprofen with no relief of Sx. Associated Sx include left eye visual disturbance that has been intermittent for x 2 weeks. There is no decrease in vision while reading. Pt has Hx of HTN and is on medication, however, has not taken the medication in x 3 months secondary to not following up with her PCP. Denies tobacco and recreational drug use. Mild ETOH use. Denies fever, chills, weakness, injuries. Denies any further complaints or symptoms at the moment. PCP: Alex Merrill MD 
 
Current Outpatient Medications Medication Sig Dispense Refill  lisinopril (PRINIVIL, ZESTRIL) 20 mg tablet Take 1 Tab by mouth daily for 14 days. 14 Tab 0  
 NIFEdipine ER (PROCARDIA XL) 90 mg ER tablet TAKE ONE TABLET BY MOUTH ONCE DAILY 90 Tab 1  
 lisinopril (PRINIVIL, ZESTRIL) 20 mg tablet TAKE ONE TABLET BY MOUTH ONCE DAILY 90 Tab 1  
 levonorgestrel (MIRENA) 20 mcg/24 hr IUD 1 Each by IntraUTERine route once. Past History Past Medical History: 
Past Medical History:  
Diagnosis Date  
 HTN (hypertension) 1/27/2011  Morbid obesity due to excess calories ([facility] [address] 8/27/2017  Pap smear 2012  
 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 Past Surgical History: 
Past Surgical History:  
Procedure Laterality Date  HX HEENT  2 months old  
 tear duct stenosis  HX UROLOGICAL    
 one kidney a little small, but OK with maturation Family History: 
Family History Problem Relation Age of Onset  Hypertension Mother  Elevated Lipids Mother  Hypertension Maternal Grandmother  Elevated Lipids Maternal Grandmother  Stroke Maternal Grandmother  Hypertension Maternal Grandfather  Elevated Lipids Maternal Grandfather  Heart Disease Maternal Grandfather  Hypertension Paternal Grandmother  Elevated Lipids Paternal Grandmother  Hypertension Paternal Grandfather  Elevated Lipids Paternal Grandfather  Cancer Maternal Aunt   
     leukemia  Hypertension Paternal Aunt Social History: 
Social History Tobacco Use  Smoking status: Never Smoker  Smokeless tobacco: Never Used Substance Use Topics  Alcohol use: Yes Alcohol/week: 0.0 oz  
  Comment: occasionally, socially  Drug use: Yes Types: Prescription, OTC Allergies: 
No Known Allergies Review of Systems Review of Systems Constitutional: Negative for chills, fatigue and fever. HENT: Negative for congestion, rhinorrhea and sore throat. Eyes: Positive for visual disturbance. Respiratory: Negative for cough, shortness of breath and wheezing. Cardiovascular: Negative for chest pain, palpitations and leg swelling. Gastrointestinal: Negative for abdominal pain, diarrhea, nausea and vomiting. Genitourinary: Negative for difficulty urinating and dysuria. Musculoskeletal: Negative for arthralgias. Skin: Negative for rash. Neurological: Positive for headaches. Negative for weakness. All other systems reviewed and are negative. Physical Exam  
 
Visit Vitals BP (!) 148/93 Pulse 89 Temp 98.4 °F (36.9 °C) Resp 16 Wt 127 kg (280 lb) SpO2 100% BMI 48.06 kg/m² Physical Exam  
Constitutional: She is oriented to person, place, and time.  She appears well-developed and well-nourished. No distress. HENT:  
Head: Normocephalic and atraumatic. Mouth/Throat: Oropharynx is clear and moist and mucous membranes are normal. No oropharyngeal exudate. Eyes: Conjunctivae and EOM are normal. No scleral icterus. The pupils are 5 mm reactive bilaterally. EOM intact. Conjunctiva is normal. No pain with light reflection. No obvious large field deficit through bilateral confrontation. Neck: Normal range of motion. Neck supple. No JVD present. No thyromegaly present. Cardiovascular: Normal rate, regular rhythm, S1 normal, S2 normal, normal heart sounds and intact distal pulses. Exam reveals no gallop and no friction rub. No murmur heard. Pulmonary/Chest: Effort normal and breath sounds normal. No accessory muscle usage. No respiratory distress. She has no wheezes. She has no rhonchi. She has no rales. She exhibits no tenderness. Abdominal: Soft. Normal appearance and bowel sounds are normal. She exhibits no distension and no pulsatile midline mass. There is no tenderness. There is no rebound and no guarding. Musculoskeletal: Normal range of motion. Lymphadenopathy:  
     Head (right side): No submandibular adenopathy present. She has no cervical adenopathy. Neurological: She is alert and oriented to person, place, and time. Moving all extremities. No obvious deficits or facial asymmetry. Skin: Skin is warm, dry and intact. No rash noted. No erythema. Psychiatric: She has a normal mood and affect. Her speech is normal and behavior is normal.  
Nursing note and vitals reviewed. Diagnostic Study Results Labs - No results found for this or any previous visit (from the past 12 hour(s)). Radiologic Studies - No results found. Medical Decision Making I am the first provider for this patient. I reviewed the vital signs, available nursing notes, past medical history, past surgical history, family history and social history. Vital Signs-Reviewed the patient's vital signs. Pulse Oximetry Analysis -  100 % on RA, normal  
 
Records Reviewed: Nursing Notes and Old Medical Records (Time of Review: 11:50 AM) Provider Notes (Medical Decision Making): ASSESSMENT / PLAN: 
  33y/o  woman, PMHx of morbid obesity and HTN who presents to the ED at the direction of her co-workers. She hasn't taken her antihypertensives for sevearl months (ran out, hasn't seen PCP). This am, she woke up with bitemporal HA, she took Ibuprofen and went back to sleep for 2hrs, HA completely gone. She was late to work (medical office), they checked her BP and it was ~636 systolic. They told her to go to the ER. On ROS, she also reports that she has worn glasses for years, no vision change but for the past ~3wks she has noticed a bright light spot on the lateral peripheral vision on her left eye. No injury, no pain, no contacts. It is usually more pronounced when in bright light and less in dark. No floaters or visual loss. Hasn't seen Ophtho for this. On exam, obese  woman, NAD, pleasant, ambulated to bed w/out difficulty. /93, rest vitals normal.  CV, lung, Abd benign. Alert/Oriented x 3, CN 2-12 grossly intact bilaterally. 5/5 UE and LE strength and normal sensation to light touch. No Pronator Drift. Normal gait. Pupils 5mm/reactive bilat, no pain, normal conjunctiva, PERRLA, EOMI, no obvious deficits with bilat confrontation. VA: 20/20 in OD, OS and OU (with her corrective lenses in place) She has asymptoamtic HTN due to med noncomplaince. Will refill Lisinopril today. She has no obvious field deficit or concerns for acute retinal detachment, infection, corneal abrasion/ulceration, or CVA. -DC home 
-Rx Lisinopril 
-Pt to call her PCP and Ophtho today to be seen for folllowup. 
-Return to ED any problems/concerns. Meliza Carrion MD 
EM-IM Physician Diagnosis Clinical Impression: 1. Essential hypertension 2. Vision problem Disposition: Home Follow-up Information Follow up With Specialties Details Why Contact Info Angel Luis Garza MD Family Practice Call in 1 day  [place] [address] [address] [facility] 
[phone] Call your EYE doctor today to schedule folllowup for your visual problems. [id number] [facility] EMERGENCY DEPT Emergency Medicine  As needed, If symptoms worsen [place] [id number] [phone] Medication List  
  
CHANGE how you take these medications * lisinopril 20 mg tablet Commonly known as:  PRINIVIL, ZESTRIL 
TAKE ONE TABLET BY MOUTH ONCE DAILY What changed:  Another medication with the same name was added. Make sure you understand how and when to take each. * lisinopril 20 mg tablet Commonly known as:  PRINIVIL Take 1 Tab by mouth daily for 14 days. What changed: You were already taking a medication with the same name, and this prescription was added. Make sure you understand how and when to take each. * This list has 2 medication(s) that are the same as other medications prescribed for you. Read the directions carefully, and ask your doctor or other care provider to review them with you. CONTINUE taking these medications MIRENA 20 mcg/24 hr (5 years) Iud 
Generic drug:  levonorgestrel NIFEdipine ER 90 mg ER tablet Commonly known as:  PROCARDIA XL 
TAKE ONE TABLET BY MOUTH ONCE DAILY Where to Get Your Medications Information about where to get these medications is not yet available Ask your nurse or doctor about these medications · lisinopril 20 mg tablet 
  
 
_______________________________ Attestations: 
Scribe Attestation Jose Alberto Pascual (Aj) acting as a scribe for and in the presence of Michael Markham MD     
January 15, 2019 at 11:59 AM 
    
Provider Attestation: I personally performed the services described in the documentation, reviewed the documentation, as recorded by the scribe in my presence, and it accurately and completely records my words and actions. January 15, 2019 at 11:59 AM - Celina Koch MD   
_______________________________

## 2019-01-15 NOTE — DISCHARGE INSTRUCTIONS
Patient Education        High Blood Pressure: Care Instructions  Your Care Instructions    If your blood pressure is usually above 130/80, you have high blood pressure, or hypertension. That means the top number is 130 or higher or the bottom number is 80 or higher, or both. Despite what a lot of people think, high blood pressure usually doesn't cause headaches or make you feel dizzy or lightheaded. It usually has no symptoms. But it does increase your risk for heart attack, stroke, and kidney or eye damage. The higher your blood pressure, the more your risk increases. Your doctor will give you a goal for your blood pressure. Your goal will be based on your health and your age. Lifestyle changes, such as eating healthy and being active, are always important to help lower blood pressure. You might also take medicine to reach your blood pressure goal.  Follow-up care is a key part of your treatment and safety. Be sure to make and go to all appointments, and call your doctor if you are having problems. It's also a good idea to know your test results and keep a list of the medicines you take. How can you care for yourself at home? Medical treatment  · If you stop taking your medicine, your blood pressure will go back up. You may take one or more types of medicine to lower your blood pressure. Be safe with medicines. Take your medicine exactly as prescribed. Call your doctor if you think you are having a problem with your medicine. · Talk to your doctor before you start taking aspirin every day. Aspirin can help certain people lower their risk of a heart attack or stroke. But taking aspirin isn't right for everyone, because it can cause serious bleeding. · See your doctor regularly. You may need to see the doctor more often at first or until your blood pressure comes down.   · If you are taking blood pressure medicine, talk to your doctor before you take decongestants or anti-inflammatory medicine, such as ibuprofen. Some of these medicines can raise blood pressure. · Learn how to check your blood pressure at home. Lifestyle changes  · Stay at a healthy weight. This is especially important if you put on weight around the waist. Losing even 10 pounds can help you lower your blood pressure. · If your doctor recommends it, get more exercise. Walking is a good choice. Bit by bit, increase the amount you walk every day. Try for at least 30 minutes on most days of the week. You also may want to swim, bike, or do other activities. · Avoid or limit alcohol. Talk to your doctor about whether you can drink any alcohol. · Try to limit how much sodium you eat to less than 2,300 milligrams (mg) a day. Your doctor may ask you to try to eat less than 1,500 mg a day. · Eat plenty of fruits (such as bananas and oranges), vegetables, legumes, whole grains, and low-fat dairy products. · Lower the amount of saturated fat in your diet. Saturated fat is found in animal products such as milk, cheese, and meat. Limiting these foods may help you lose weight and also lower your risk for heart disease. · Do not smoke. Smoking increases your risk for heart attack and stroke. If you need help quitting, talk to your doctor about stop-smoking programs and medicines. These can increase your chances of quitting for good. When should you call for help? Call 911 anytime you think you may need emergency care. This may mean having symptoms that suggest that your blood pressure is causing a serious heart or blood vessel problem. Your blood pressure may be over 180/120.   For example, call 911 if:    · You have symptoms of a heart attack. These may include:  ? Chest pain or pressure, or a strange feeling in the chest.  ? Sweating. ? Shortness of breath. ? Nausea or vomiting. ? Pain, pressure, or a strange feeling in the back, neck, jaw, or upper belly or in one or both shoulders or arms. ? Lightheadedness or sudden weakness.   ? A fast or irregular heartbeat.     · You have symptoms of a stroke. These may include:  ? Sudden numbness, tingling, weakness, or loss of movement in your face, arm, or leg, especially on only one side of your body. ? Sudden vision changes. ? Sudden trouble speaking. ? Sudden confusion or trouble understanding simple statements. ? Sudden problems with walking or balance. ? A sudden, severe headache that is different from past headaches.     · You have severe back or belly pain.    Do not wait until your blood pressure comes down on its own. Get help right away.   Call your doctor now or seek immediate care if:    · Your blood pressure is much higher than normal (such as 180/120 or higher), but you don't have symptoms.     · You think high blood pressure is causing symptoms, such as:  ? Severe headache.  ? Blurry vision.    Watch closely for changes in your health, and be sure to contact your doctor if:    · Your blood pressure measures higher than your doctor recommends at least 2 times. That means the top number is higher or the bottom number is higher, or both.     · You think you may be having side effects from your blood pressure medicine. Where can you learn more? Go to http://rickie-manjit.info/. Enter Z851 in the search box to learn more about \"High Blood Pressure: Care Instructions. \"  Current as of: December 6, 2017  Content Version: 11.8  © 0735-1913 [employer]. Care instructions adapted under license by [employer] (which disclaims liability or warranty for this information). If you have questions about a medical condition or this instruction, always ask your healthcare professional. [place] [address] any warranty or liability for your use of this information. Patient Education        Floaters and Flashes: Care Instructions  Your Care Instructions    Floaters are spots and lines that \"float\" across your field of vision.  They are caused by stray cells or strands of tissue inside the eyeball. Flashes are sparkles or lightning streaks. These occur in your side vision. This is called the peripheral vision. Floaters and flashes usually aren't serious. In many cases, they're a normal part of getting older. Some people get used to them. Others find them annoying. If floaters bother you, you can try to look up and then down. This may make them go away. For now, your doctor doesn't think your symptoms are a sign of a more serious problem. But an eye exam is the only way to know for sure. Follow-up care is a key part of your treatment and safety. Be sure to make and go to all appointments, and call your doctor if you are having problems. It's also a good idea to know your test results and keep a list of the medicines you take. When should you call for help? Call your doctor now or seek immediate medical care if:    · You have vision changes.    Watch closely for changes in your health, and be sure to contact your doctor if:    · You see new floaters.     · You see new flashes of light.     · You do not get better as expected. Where can you learn more? Go to http://rickie-manjit.info/. Enter I942 in the search box to learn more about \"Floaters and Flashes: Care Instructions. \"  Current as of: December 3, 2017  Content Version: 11.8  © 7941-4604 [employer]. Care instructions adapted under license by [employer] (which disclaims liability or warranty for this information). If you have questions about a medical condition or this instruction, always ask your healthcare professional. [address] any warranty or liability for your use of this information.

## 2019-01-15 NOTE — ED NOTES
Daniela Huertas is a 28 y.o. female that was discharged in stable. Pt was accompanied by self. Pt is driving. The patients diagnosis, condition and treatment were explained to  patient and aftercare instructions were given. The patient verbalized understanding. Patient armband removed and shredded.

## 2019-01-16 ENCOUNTER — OFFICE VISIT (OUTPATIENT)
Dept: INTERNAL MEDICINE CLINIC | Age: 34
End: 2019-01-16

## 2019-01-16 VITALS
HEART RATE: 70 BPM | TEMPERATURE: 98 F | DIASTOLIC BLOOD PRESSURE: 110 MMHG | BODY MASS INDEX: 49.72 KG/M2 | RESPIRATION RATE: 18 BRPM | WEIGHT: 291.2 LBS | OXYGEN SATURATION: 99 % | HEIGHT: 64 IN | SYSTOLIC BLOOD PRESSURE: 173 MMHG

## 2019-01-16 DIAGNOSIS — R73.03 PRE-DIABETES: ICD-10-CM

## 2019-01-16 DIAGNOSIS — H53.8 BLURRY VISION: ICD-10-CM

## 2019-01-16 DIAGNOSIS — E66.01 OBESITY, MORBID, BMI 50 OR HIGHER (HCC): ICD-10-CM

## 2019-01-16 DIAGNOSIS — I10 ESSENTIAL HYPERTENSION: Primary | ICD-10-CM

## 2019-01-16 RX ORDER — NIFEDIPINE 90 MG/1
TABLET, EXTENDED RELEASE ORAL
Qty: 90 TAB | Refills: 1 | Status: SHIPPED | OUTPATIENT
Start: 2019-01-16 | End: 2019-09-04 | Stop reason: SDUPTHER

## 2019-01-16 RX ORDER — LISINOPRIL 20 MG/1
TABLET ORAL
Qty: 90 TAB | Refills: 1 | Status: SHIPPED | OUTPATIENT
Start: 2019-01-16 | End: 2019-09-04 | Stop reason: SDUPTHER

## 2019-01-16 NOTE — PROGRESS NOTES
INTERNISTS OF [facility] [place]: 
1/16/2019, MRN: [id number] Blanca Cheek is a 28 y.o. female and presents to clinic for ED Follow-up [facility] ER [id number] for Headache ) Subjective: The patient is a 42-year-old female with history of obesity, prediabetes, and hypertension. Headache/HTN/Prediabetes: She was seen in the ED with c/o elevated BP in the setting of HAs. She had associated blurry vision. Since her ED visit, she continues to report left eye blurry vision. She has yet to schedule for a formal eye exam. She plans to schedule an apt today. She has a h/o HTN, taking lisinopril and nifedipine but admits to not taking these rx for 3 months. No adverse side effects from these rx. Her weight today is 291lbs. Her BP is 173/110. No tobacco/ETOH use. No energy drink consumption. No drug use. She reports no headache symptoms, chest pain, blurry vision, paresthesia, and weakness today. She is not exercising/dieting. Wt Readings from Last 3 Encounters:  
01/16/19 291 lb 3.2 oz (132.1 kg) 01/15/19 280 lb (127 kg) 04/25/18 256 lb (116.1 kg) Patient Active Problem List  
 Diagnosis Date Noted  Morbid obesity due to excess calories ([address] 08/27/2017  Pre-diabetes 12/24/2015  
 HTN (hypertension) 01/27/2011 Current Outpatient Medications Medication Sig Dispense Refill  NIFEdipine ER (PROCARDIA XL) 90 mg ER tablet TAKE ONE TABLET BY MOUTH ONCE DAILY 90 Tab 1  
 lisinopril (PRINIVIL, ZESTRIL) 20 mg tablet TAKE ONE TABLET BY MOUTH ONCE DAILY 90 Tab 1  
 levonorgestrel (MIRENA) 20 mcg/24 hr IUD 1 Each by IntraUTERine route once. No Known Allergies Past Medical History:  
Diagnosis Date  
 HTN (hypertension) 1/27/2011  Morbid obesity due to excess calories ([address] 8/27/2017  Pap smear 2012  
 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 Past Surgical History:  
Procedure Laterality Date  HX HEENT  2 months old  
 tear duct stenosis  HX UROLOGICAL one kidney a little small, but OK with maturation Family History Problem Relation Age of Onset  Hypertension Mother  Elevated Lipids Mother  Hypertension Maternal Grandmother  Elevated Lipids Maternal Grandmother  Stroke Maternal Grandmother  Hypertension Maternal Grandfather  Elevated Lipids Maternal Grandfather  Heart Disease Maternal Grandfather  Hypertension Paternal Grandmother  Elevated Lipids Paternal Grandmother  Hypertension Paternal Grandfather  Elevated Lipids Paternal Grandfather  Cancer Maternal Aunt   
     leukemia  Hypertension Paternal Aunt Social History Tobacco Use  Smoking status: Never Smoker  Smokeless tobacco: Never Used Substance Use Topics  Alcohol use: Yes Alcohol/week: 0.0 oz  
  Comment: occasionally, socially ROS Review of Systems Constitutional: Negative for chills, fever and weight loss. HENT: Negative for ear pain and sore throat. Eyes: Positive for blurred vision (see HPI). Negative for pain. Respiratory: Negative for cough and shortness of breath. Cardiovascular: Negative for chest pain. Gastrointestinal: Negative for abdominal pain, blood in stool and melena. Genitourinary: Negative for dysuria and hematuria. Musculoskeletal: Negative for joint pain and myalgias. Skin: Negative for rash. Neurological: Negative for tingling, focal weakness and headaches (none today). Endo/Heme/Allergies: Does not bruise/bleed easily. Psychiatric/Behavioral: Negative for substance abuse. Objective Vitals:  
 01/16/19 8103 01/16/19 9349 BP: (!) 185/114 (!) 173/110 Pulse: 75 70 Resp: 20 18 Temp: 98 °F (36.7 °C) TempSrc: Oral   
SpO2: 99% Weight: 291 lb 3.2 oz (132.1 kg) Height: 5' 4\" (1.626 m) PainSc:   0 - No pain Physical Exam  
Constitutional: She is oriented to person, place, and time and well-developed, well-nourished, and in no distress. HENT:  
Head: Normocephalic and atraumatic. Right Ear: External ear normal.  
Left Ear: External ear normal.  
Nose: Nose normal.  
Mouth/Throat: Oropharynx is clear and moist. No oropharyngeal exudate. Eyes: Conjunctivae and EOM are normal. Pupils are equal, round, and reactive to light. Right eye exhibits no discharge. Left eye exhibits no discharge. No scleral icterus. No visual field deficits per my exam today Neck: Neck supple. Cardiovascular: Normal rate, regular rhythm, normal heart sounds and intact distal pulses. Exam reveals no gallop and no friction rub. No murmur heard. Pulmonary/Chest: Effort normal and breath sounds normal. No respiratory distress. She has no wheezes. She has no rales. Abdominal: Soft. Bowel sounds are normal. She exhibits no distension. There is no tenderness. There is no rebound and no guarding. Musculoskeletal: She exhibits no edema or tenderness (BUE). Lymphadenopathy:  
  She has no cervical adenopathy. Neurological: She is alert and oriented to person, place, and time. She exhibits normal muscle tone. Gait normal.  
Skin: Skin is warm and dry. No erythema. Psychiatric: Affect normal.  
Nursing note and vitals reviewed. LABS Data Review: No results found for: WBC, WBCLT, HGBPOC, HGB, HGBP, HCTPOC, HCT, PHCT, RBCH, PLT, MCV, HGBEXT, HCTEXT, PLTEXT, HGBEXT, HCTEXT, PLTEXT Lab Results Component Value Date/Time Sodium 140 08/04/2017 11:08 PM  
 Potassium 5.4 08/04/2017 11:08 PM  
 Chloride 102 08/04/2017 11:08 PM  
 CO2 25 08/04/2017 11:08 PM  
 Anion gap 13.0 08/04/2017 11:08 PM  
 Glucose 99 08/04/2017 11:08 PM  
 BUN 14 08/04/2017 11:08 PM  
 Creatinine 0.8 08/04/2017 11:08 PM  
 BUN/Creatinine ratio 20 04/19/2013 09:35 AM  
 GFR est AA >60 04/19/2013 09:35 AM  
 GFR est non-AA >60 04/19/2013 09:35 AM  
 Calcium 8.8 08/04/2017 11:08 PM  
 
 
Lab Results Component Value Date/Time  Cholesterol, total 154 02/21/2011 11:15 AM  
 HDL Cholesterol 47 02/21/2011 11:15 AM  
 LDL, calculated 88 02/21/2011 11:15 AM  
 VLDL, calculated 19 02/21/2011 11:15 AM  
 Triglyceride 97 02/21/2011 11:15 AM  
 
 
Lab Results Component Value Date/Time Hemoglobin A1c 5.7 08/04/2017 11:08 PM  
 Hemoglobin A1c (POC) 5.8 12/24/2015 11:42 AM  
 
 
Assessment/Plan: 1. Essential hypertension: BP is not at goal but she is not taking rx. +Left eye blurry vision. +Prediabetes - Referral for a formal eye exam given c/o blurry vision (left eye) and to r/o HTN retinopathy. 
- Checking a BMP, urine protein screen, and an A1C 
- I encouraged her to take her BP rx. Refilling her nifedipine and lisinopril - We discussed the complications associated with poorly controlled hypertension. 
- I encouraged her to meal prep healthy options low in carbs and to reduce her weight. I will recheck her weight at her f/u apt. She set a weight loss goal of 20lbs by the summer. ORDERS: 
- METABOLIC PANEL, BASIC; Future - MICROALBUMIN, UR, RAND W/ MICROALB/CREAT RATIO; Future 
- HEMOGLOBIN A1C W/O EAG; Future 
- NIFEdipine ER (PROCARDIA XL) 90 mg ER tablet; TAKE ONE TABLET BY MOUTH ONCE DAILY  Dispense: 90 Tab; Refill: 1 
- lisinopril (PRINIVIL, ZESTRIL) 20 mg tablet; TAKE ONE TABLET BY MOUTH ONCE DAILY  Dispense: 90 Tab; Refill: 1 
- REFERRAL TO OPHTHALMOLOGY 2. General: - She had her PAP in 1/18 but we do not have records. She also had an IUD placed at that time. Requesting her last PAP. Health Maintenance Due Topic Date Due  
 PAP AKA CERVICAL CYTOLOGY  05/01/2015 Lab review: labs are reviewed I have discussed the diagnosis with the patient and the intended plan as seen in the above orders. The patient has received an after-visit summary and questions were answered concerning future plans. I have discussed medication side effects and warnings with the patient as well.  I have reviewed the plan of care with the patient, accepted their input and they are in agreement with the treatment goals. All questions were answered. The patient understands the plan of care. Handouts provided today with above information. Pt instructed if symptoms worsen to call the office or report to the ED for continued care. Greater than 50% of the visit time was spent in counseling and/or coordination of care. Voice recognition was used to generate this report, which may have resulted in some phonetic based errors in grammar and contents. Even though attempts were made to correct all the mistakes, some may have been missed, and remained in the body of the document. Follow-up Disposition: 
Return in about 4 months (around 5/16/2019) for BP check, weight check, headaches.  
 
Yumiko Christensen MD

## 2019-01-16 NOTE — PROGRESS NOTES
Chief Complaint Patient presents with  ED Follow-up [facility] ER [id number] for Headache Patient stated she has been without blood pressure medication since October 2018. The patient's reason for not coming in to get her refills on her medications is due to getting , and just didn't find the time to get in here to see the physician. 1. Have you been to the ER, urgent care clinic since your last visit? Hospitalized since your last visit? Yes When: 01-15-19 Where: [facility] Reason: Headache 2. Have you seen or consulted any other health care providers outside of the [address] [place] [address] since your last visit? Include any pap smears or colon screening. No 
 
Patient was given a copy of the Advanced Directive and understands to bring it in once completed. Health Maintenance Due Topic Date Due  
 PAP AKA CERVICAL CYTOLOGY  05/01/2015

## 2019-01-16 NOTE — PATIENT INSTRUCTIONS
Patient was given a copy of the Advanced Medical Directive Form, and understands to bring it in once completed. Health Maintenance Due Topic Date Due  
 PAP AKA CERVICAL CYTOLOGY  05/01/2015 Body Mass Index: Care Instructions Your Care Instructions Body mass index (BMI) can help you see if your weight is raising your risk for health problems. It uses a formula to compare how much you weigh with how tall you are. · A BMI lower than 18.5 is considered underweight. · A BMI between 18.5 and 24.9 is considered healthy. · A BMI between 25 and 29.9 is considered overweight. A BMI of 30 or higher is considered obese. If your BMI is in the normal range, it means that you have a lower risk for weight-related health problems. If your BMI is in the overweight or obese range, you may be at increased risk for weight-related health problems, such as high blood pressure, heart disease, stroke, arthritis or joint pain, and diabetes. If your BMI is in the underweight range, you may be at increased risk for health problems such as fatigue, lower protection (immunity) against illness, muscle loss, bone loss, hair loss, and hormone problems. BMI is just one measure of your risk for weight-related health problems. You may be at higher risk for health problems if you are not active, you eat an unhealthy diet, or you drink too much alcohol or use tobacco products. Follow-up care is a key part of your treatment and safety. Be sure to make and go to all appointments, and call your doctor if you are having problems. It's also a good idea to know your test results and keep a list of the medicines you take. How can you care for yourself at home? · Practice healthy eating habits. This includes eating plenty of fruits, vegetables, whole grains, lean protein, and low-fat dairy. · If your doctor recommends it, get more exercise. Walking is a good choice. Bit by bit, increase the amount you walk every day.  Try for at least 30 minutes on most days of the week. · Do not smoke. Smoking can increase your risk for health problems. If you need help quitting, talk to your doctor about stop-smoking programs and medicines. These can increase your chances of quitting for good. · Limit alcohol to 2 drinks a day for men and 1 drink a day for women. Too much alcohol can cause health problems. If you have a BMI higher than 25 · Your doctor may do other tests to check your risk for weight-related health problems. This may include measuring the distance around your waist. A waist measurement of more than 40 inches in men or 35 inches in women can increase the risk of weight-related health problems. · Talk with your doctor about steps you can take to stay healthy or improve your health. You may need to make lifestyle changes to lose weight and stay healthy, such as changing your diet and getting regular exercise. If you have a BMI lower than 18.5 · Your doctor may do other tests to check your risk for health problems. · Talk with your doctor about steps you can take to stay healthy or improve your health. You may need to make lifestyle changes to gain or maintain weight and stay healthy, such as getting more healthy foods in your diet and doing exercises to build muscle. Where can you learn more? Go to http://rickie-manjit.info/. Enter S176 in the search box to learn more about \"Body Mass Index: Care Instructions. \" Current as of: October 13, 2016 Content Version: 11.4 © 8461-2196 [employer]wise, Incorporated. Care instructions adapted under license by [employer] (which disclaims liability or warranty for this information). If you have questions about a medical condition or this instruction, always ask your healthcare professional. [address] any warranty or liability for your use of this information.

## 2019-01-17 ENCOUNTER — TELEPHONE (OUTPATIENT)
Dept: INTERNAL MEDICINE CLINIC | Age: 34
End: 2019-01-17

## 2019-01-17 LAB
BUN SERPL-MCNC: 14 MG/DL (ref 7–25)
BUN/CREATININE RATIO,BUCR: NORMAL (CALC) (ref 6–22)
CALCIUM SERPL-MCNC: 8.8 MG/DL (ref 8.6–10.2)
CHLORIDE SERPL-SCNC: 104 MMOL/L (ref 98–110)
CO2 SERPL-SCNC: 30 MMOL/L (ref 20–32)
CREAT SERPL-MCNC: 0.66 MG/DL (ref 0.5–1.1)
CREATININE URINE,9612018: 74 MG/DL (ref 20–275)
GLUCOSE SERPL-MCNC: 96 MG/DL (ref 65–99)
HBA1C MFR BLD HPLC: 5.5 % OF TOTAL HGB
MICROALBUMIN,URINE RANDOM 140054: 0.5 MG/DL
MICROALBUMIN/CREAT RATIO: 7 MCG/MG CREAT
POTASSIUM SERPL-SCNC: 4.4 MMOL/L (ref 3.5–5.3)
SODIUM SERPL-SCNC: 138 MMOL/L (ref 135–146)

## 2019-01-17 NOTE — TELEPHONE ENCOUNTER
Patient contacted, patient identified with two identifiers (Name & ). Patient is aware of results per DR. Eugenia Linares.

## 2019-01-17 NOTE — TELEPHONE ENCOUNTER
----- Message from Manoj Solorzano MD sent at 1/17/2019  1:42 PM EST -----  Please let her know that her renal function and A1C are normal. No additional changes need to be made to her med list.    Dr. Eddy Lemos  [facility] of [facility], [address] [place] [address], [address] [place] [address] Str.  Phone: [phone]  Fax: [phone]

## 2019-01-17 NOTE — PROGRESS NOTES
Please let her know that her renal function and A1C are normal. No additional changes need to be made to her med list. 
 
Dr. Beth Lydia [facility] of [address] [place] [address] [address], [address] [facility], [address] [place] [address] Str. Phone: [phone] Fax: [phone]

## 2019-06-12 ENCOUNTER — OFFICE VISIT (OUTPATIENT)
Dept: INTERNAL MEDICINE CLINIC | Age: 34
End: 2019-06-12

## 2019-06-12 VITALS
TEMPERATURE: 99.2 F | BODY MASS INDEX: 49.44 KG/M2 | DIASTOLIC BLOOD PRESSURE: 79 MMHG | WEIGHT: 289.6 LBS | HEART RATE: 75 BPM | OXYGEN SATURATION: 98 % | RESPIRATION RATE: 16 BRPM | SYSTOLIC BLOOD PRESSURE: 136 MMHG | HEIGHT: 64 IN

## 2019-06-12 DIAGNOSIS — E66.01 MORBID OBESITY DUE TO EXCESS CALORIES (HCC): ICD-10-CM

## 2019-06-12 DIAGNOSIS — I10 ESSENTIAL HYPERTENSION: Primary | ICD-10-CM

## 2019-06-12 NOTE — PROGRESS NOTES
Kate Gil presents today for   Chief Complaint   Patient presents with    Follow-up    Hypertension              Depression Screening:  3 most recent PHQ Screens 1/16/2019   Little interest or pleasure in doing things Not at all   Feeling down, depressed, irritable, or hopeless Not at all   Total Score PHQ 2 0       Learning Assessment:  Learning Assessment 1/16/2019   PRIMARY LEARNER Patient   HIGHEST LEVEL OF EDUCATION - PRIMARY LEARNER  SOME COLLEGE   BARRIERS PRIMARY LEARNER NONE   CO-LEARNER CAREGIVER No   CO-LEARNER HIGHEST LEVEL OF EDUCATION -   PRIMARY LANGUAGE ENGLISH   LEARNER PREFERENCE PRIMARY DEMONSTRATION   ANSWERED BY patient   RELATIONSHIP SELF       Abuse Screening:  Abuse Screening Questionnaire 1/16/2019   Do you ever feel afraid of your partner? N   Are you in a relationship with someone who physically or mentally threatens you? N   Is it safe for you to go home? Y       Fall Risk  No flowsheet data found. Coordination of Care:  1. Have you been to the ER, urgent care clinic since your last visit? Hospitalized since your last visit? no    2. Have you seen or consulted any other health care providers outside of the [address] [place] [address] since your last visit? Include any pap smears or colon screening. no      Advance Directive:  1. Do you have an advance directive in place? Patient Reply:no    2. If not, would you like material regarding how to put one in place?  Patient Reply: no

## 2019-06-12 NOTE — PATIENT INSTRUCTIONS
Body Mass Index: Care Instructions  Your Care Instructions    Body mass index (BMI) can help you see if your weight is raising your risk for health problems. It uses a formula to compare how much you weigh with how tall you are. · A BMI lower than 18.5 is considered underweight. · A BMI between 18.5 and 24.9 is considered healthy. · A BMI between 25 and 29.9 is considered overweight. A BMI of 30 or higher is considered obese. If your BMI is in the normal range, it means that you have a lower risk for weight-related health problems. If your BMI is in the overweight or obese range, you may be at increased risk for weight-related health problems, such as high blood pressure, heart disease, stroke, arthritis or joint pain, and diabetes. If your BMI is in the underweight range, you may be at increased risk for health problems such as fatigue, lower protection (immunity) against illness, muscle loss, bone loss, hair loss, and hormone problems. BMI is just one measure of your risk for weight-related health problems. You may be at higher risk for health problems if you are not active, you eat an unhealthy diet, or you drink too much alcohol or use tobacco products. Follow-up care is a key part of your treatment and safety. Be sure to make and go to all appointments, and call your doctor if you are having problems. It's also a good idea to know your test results and keep a list of the medicines you take. How can you care for yourself at home? · Practice healthy eating habits. This includes eating plenty of fruits, vegetables, whole grains, lean protein, and low-fat dairy. · If your doctor recommends it, get more exercise. Walking is a good choice. Bit by bit, increase the amount you walk every day. Try for at least 30 minutes on most days of the week. · Do not smoke. Smoking can increase your risk for health problems. If you need help quitting, talk to your doctor about stop-smoking programs and medicines. These can increase your chances of quitting for good. · Limit alcohol to 2 drinks a day for men and 1 drink a day for women. Too much alcohol can cause health problems. If you have a BMI higher than 25  · Your doctor may do other tests to check your risk for weight-related health problems. This may include measuring the distance around your waist. A waist measurement of more than 40 inches in men or 35 inches in women can increase the risk of weight-related health problems. · Talk with your doctor about steps you can take to stay healthy or improve your health. You may need to make lifestyle changes to lose weight and stay healthy, such as changing your diet and getting regular exercise. If you have a BMI lower than 18.5  · Your doctor may do other tests to check your risk for health problems. · Talk with your doctor about steps you can take to stay healthy or improve your health. You may need to make lifestyle changes to gain or maintain weight and stay healthy, such as getting more healthy foods in your diet and doing exercises to build muscle. Where can you learn more? Go to http://rickie-manjit.info/. Enter S176 in the search box to learn more about \"Body Mass Index: Care Instructions. \"  Current as of: June 25, 2018  Content Version: 11.9  © 1673-2436 [employer]. Care instructions adapted under license by [employer] (which disclaims liability or warranty for this information). If you have questions about a medical condition or this instruction, always ask your healthcare professional. [address] any warranty or liability for your use of this information.

## 2019-06-12 NOTE — PROGRESS NOTES
INTERNISTS OF [facility]:  6/12/2019, MRN: [id number]      Tori Cherry is a 27 y.o. female and presents to clinic for Follow-up and Hypertension    Subjective: The patient is a 26-year-old female with history of obesity, prediabetes, and hypertension. 1.  Hypertension: Present for over 6 months. On nifedipine and lisinopril. No adverse effects of taking these medications. Her BP is 136/79 today. Her weight is 289lbs today. She is not really dieting. She walks regularly. Wt Readings from Last 3 Encounters:   06/12/19 289 lb 9.6 oz (131.4 kg)   01/16/19 291 lb 3.2 oz (132.1 kg)   01/15/19 280 lb (127 kg)     2. General: Her last IUD was placed in 3/18. It is due to come out in 2021. No menses. Patient Active Problem List    Diagnosis Date Noted    Morbid obesity due to excess calories ([address] 08/27/2017    Pre-diabetes 12/24/2015    HTN (hypertension) 01/27/2011       Current Outpatient Medications   Medication Sig Dispense Refill    NIFEdipine ER (PROCARDIA XL) 90 mg ER tablet TAKE ONE TABLET BY MOUTH ONCE DAILY 90 Tab 1    lisinopril (PRINIVIL, ZESTRIL) 20 mg tablet TAKE ONE TABLET BY MOUTH ONCE DAILY 90 Tab 1    levonorgestrel (MIRENA) 20 mcg/24 hr IUD 1 Each by IntraUTERine route once. No Known Allergies    Past Medical History:   Diagnosis Date    HTN (hypertension) 1/27/2011    Morbid obesity due to excess calories ([address] 8/27/2017    Pap smear 2012    total care for women.         Past Surgical History:   Procedure Laterality Date    HX HEENT  2 months old    tear duct stenosis    HX UROLOGICAL      one kidney a little small, but OK with maturation       Family History   Problem Relation Age of Onset    Hypertension Mother     Elevated Lipids Mother     Hypertension Maternal Grandmother     Elevated Lipids Maternal Grandmother     Stroke Maternal Grandmother     Hypertension Maternal Grandfather     Elevated Lipids Maternal Grandfather     Heart Disease Maternal Grandfather     Hypertension Paternal Grandmother     Elevated Lipids Paternal Grandmother     Hypertension Paternal Grandfather     Elevated Lipids Paternal Grandfather     Cancer Maternal Aunt         leukemia    Hypertension Paternal Aunt        Social History     Tobacco Use    Smoking status: Never Smoker    Smokeless tobacco: Never Used   Substance Use Topics    Alcohol use: Yes     Alcohol/week: 0.0 oz     Comment: occasionally, socially       ROS   Review of Systems   Constitutional: Negative for chills and fever. HENT: Negative for ear pain and sore throat. Eyes: Negative for blurred vision and pain. Respiratory: Negative for cough and shortness of breath. Cardiovascular: Negative for chest pain. Gastrointestinal: Negative for abdominal pain and melena. Genitourinary: Negative for dysuria and hematuria. Musculoskeletal: Negative for joint pain and myalgias. Skin: Negative for rash. Neurological: Negative for tingling, focal weakness and headaches. Endo/Heme/Allergies: Does not bruise/bleed easily. Psychiatric/Behavioral: Negative for substance abuse. Objective     Vitals:    06/12/19 1224   BP: 136/79   Pulse: 75   Resp: 16   Temp: 99.2 °F (37.3 °C)   TempSrc: Oral   SpO2: 98%   Weight: 289 lb 9.6 oz (131.4 kg)   Height: 5' 4\" (1.626 m)   PainSc:   0 - No pain       Physical Exam   Constitutional: She is oriented to person, place, and time and well-developed, well-nourished, and in no distress. HENT:   Head: Normocephalic and atraumatic. Right Ear: External ear normal.   Left Ear: External ear normal.   Nose: Nose normal.   Mouth/Throat: Oropharynx is clear and moist. No oropharyngeal exudate. Eyes: Conjunctivae and EOM are normal. Right eye exhibits no discharge. Left eye exhibits no discharge. No scleral icterus. Neck: Neck supple. Cardiovascular: Normal rate, regular rhythm, normal heart sounds and intact distal pulses.  Exam reveals no gallop and no friction rub. No murmur heard. Pulmonary/Chest: Effort normal and breath sounds normal. No respiratory distress. She has no wheezes. She has no rales. Abdominal: Soft. Bowel sounds are normal. She exhibits no distension. There is no tenderness. There is no rebound and no guarding. Musculoskeletal: She exhibits no edema or tenderness (BUE). Lymphadenopathy:     She has no cervical adenopathy. Neurological: She is alert and oriented to person, place, and time. She exhibits normal muscle tone. Gait normal.   Skin: Skin is warm and dry. No erythema. Psychiatric: Affect normal.   Nursing note and vitals reviewed. LABS   Data Review:   No results found for: WBC, WBCLT, HGBPOC, HGB, HGBP, HCTPOC, HCT, PHCT, RBCH, PLT, MCV, HGBEXT, HCTEXT, PLTEXT, HGBEXT, HCTEXT, PLTEXT    Lab Results   Component Value Date/Time    Sodium 138 01/16/2019 10:15 AM    Potassium 4.4 01/16/2019 10:15 AM    Chloride 104 01/16/2019 10:15 AM    CO2 30 01/16/2019 10:15 AM    Anion gap 13.0 08/04/2017 11:08 PM    Glucose 96 01/16/2019 10:15 AM    BUN 14 01/16/2019 10:15 AM    Creatinine 0.66 01/16/2019 10:15 AM    BUN/Creatinine ratio NOT APPLICABLE 67/48/2848 24:36 AM    GFR est  01/16/2019 10:15 AM    GFR est non- 01/16/2019 10:15 AM    Calcium 8.8 01/16/2019 10:15 AM       Lab Results   Component Value Date/Time    Cholesterol, total 154 02/21/2011 11:15 AM    HDL Cholesterol 47 02/21/2011 11:15 AM    LDL, calculated 88 02/21/2011 11:15 AM    VLDL, calculated 19 02/21/2011 11:15 AM    Triglyceride 97 02/21/2011 11:15 AM       Lab Results   Component Value Date/Time    Hemoglobin A1c 5.5 01/16/2019 10:15 AM    Hemoglobin A1c (POC) 5.8 12/24/2015 11:42 AM       Assessment/Plan:   1. Essential hypertension:   -Checking a CMP, A1c, and a urine protein screen just before her follow-up appointment.  -Continue with Rx as prescribed.   - I encouraged her to reduce her weight by exercising regularly and limiting her carb intake. I will recheck her weight at her follow-up appointment.  -Return to clinic for BP check. ORDERS:  - METABOLIC PANEL, COMPREHENSIVE; Future  - MICROALBUMIN, UR, RAND W/ MICROALB/CREAT RATIO; Future  - HEMOGLOBIN A1C W/O EAG; Future    2. Health Maintenance:  -Requesting her last Pap. Health Maintenance Due   Topic Date Due    PAP AKA CERVICAL CYTOLOGY  05/01/2015     Lab review: labs are reviewed in the EHR and ordered as mentioned above    I have discussed the diagnosis with the patient and the intended plan as seen in the above orders. The patient has received an after-visit summary and questions were answered concerning future plans. I have discussed medication side effects and warnings with the patient as well. I have reviewed the plan of care with the patient, accepted their input and they are in agreement with the treatment goals. All questions were answered. The patient understands the plan of care. Handouts provided today with above information. Pt instructed if symptoms worsen to call the office or report to the ED for continued care. Greater than 50% of the visit time was spent in counseling and/or coordination of care. Voice recognition was used to generate this report, which may have resulted in some phonetic based errors in grammar and contents. Even though attempts were made to correct all the mistakes, some may have been missed, and remained in the body of the document. Follow-up and Dispositions    · Return in about 6 months (around 12/12/2019) for BP check, weight check.          Amy Ventura MD

## 2019-09-04 DIAGNOSIS — I10 ESSENTIAL HYPERTENSION: ICD-10-CM

## 2019-09-04 DIAGNOSIS — E66.01 OBESITY, MORBID, BMI 50 OR HIGHER (HCC): ICD-10-CM

## 2019-09-05 RX ORDER — LISINOPRIL 20 MG/1
TABLET ORAL
Qty: 90 TAB | Refills: 1 | Status: SHIPPED | OUTPATIENT
Start: 2019-09-05 | End: 2020-05-14 | Stop reason: SDUPTHER

## 2019-09-05 RX ORDER — NIFEDIPINE 90 MG/1
TABLET, EXTENDED RELEASE ORAL
Qty: 90 TAB | Refills: 1 | Status: SHIPPED | OUTPATIENT
Start: 2019-09-05 | End: 2020-05-14 | Stop reason: SDUPTHER

## 2019-09-05 NOTE — TELEPHONE ENCOUNTER
PCP: Sabrina Driver MD    Last appt: 6/12/2019  Future Appointments   Date Time Provider Georges Emmanuel   12/17/2019 12:00 PM Sabrina Driver MD [address]       Requested Prescriptions     Pending Prescriptions Disp Refills    NIFEdipine ER (PROCARDIA XL) 90 mg ER tablet 90 Tab 1     Sig: TAKE ONE TABLET BY MOUTH ONCE DAILY    lisinopril (PRINIVIL, ZESTRIL) 20 mg tablet 90 Tab 1     Sig: TAKE ONE TABLET BY MOUTH ONCE DAILY

## 2019-12-06 DIAGNOSIS — R73.03 PRE-DIABETES: ICD-10-CM

## 2019-12-06 DIAGNOSIS — I10 ESSENTIAL HYPERTENSION: Primary | ICD-10-CM

## 2019-12-06 DIAGNOSIS — E66.01 MORBID OBESITY DUE TO EXCESS CALORIES (HCC): ICD-10-CM

## 2019-12-17 ENCOUNTER — OFFICE VISIT (OUTPATIENT)
Dept: INTERNAL MEDICINE CLINIC | Age: 34
End: 2019-12-17

## 2019-12-17 VITALS
OXYGEN SATURATION: 100 % | DIASTOLIC BLOOD PRESSURE: 84 MMHG | TEMPERATURE: 98.9 F | HEIGHT: 64 IN | SYSTOLIC BLOOD PRESSURE: 126 MMHG | RESPIRATION RATE: 20 BRPM | HEART RATE: 74 BPM | BODY MASS INDEX: 46.78 KG/M2 | WEIGHT: 274 LBS

## 2019-12-17 DIAGNOSIS — E66.01 MORBID OBESITY DUE TO EXCESS CALORIES (HCC): ICD-10-CM

## 2019-12-17 DIAGNOSIS — R73.03 PRE-DIABETES: ICD-10-CM

## 2019-12-17 DIAGNOSIS — I10 ESSENTIAL HYPERTENSION: Primary | ICD-10-CM

## 2019-12-17 NOTE — PROGRESS NOTES
INTERNISTS OF [facility]:  12/17/2019, MRN: [id number]      Bisi Holland is a 27 y.o. female and presents to clinic for Follow-up    Subjective: The patient is a 60-year-old female with history of obesity, prediabetes, and hypertension. HTN: BP is 126/84 today. Taking lisinopril and Nifedipine. No adverse side effects from this rx. +H/o Prediabetes. Weight is 274lbs. Labs have been ordered but she has yet to get them drawn. She plans to get them checked soon. She lost weight this yr with a low carb diet. Wt Readings from Last 3 Encounters:   12/17/19 274 lb (124.3 kg)   06/12/19 289 lb 9.6 oz (131.4 kg)   01/16/19 291 lb 3.2 oz (132.1 kg)       Patient Active Problem List    Diagnosis Date Noted    Morbid obesity due to excess calories ([address] 08/27/2017    Pre-diabetes 12/24/2015    HTN (hypertension) 01/27/2011       Current Outpatient Medications   Medication Sig Dispense Refill    NIFEdipine ER (PROCARDIA XL) 90 mg ER tablet TAKE ONE TABLET BY MOUTH ONCE DAILY 90 Tab 1    lisinopril (PRINIVIL, ZESTRIL) 20 mg tablet TAKE ONE TABLET BY MOUTH ONCE DAILY 90 Tab 1    levonorgestrel (MIRENA) 20 mcg/24 hr IUD 1 Each by IntraUTERine route once. No Known Allergies    Past Medical History:   Diagnosis Date    HTN (hypertension) 1/27/2011    Morbid obesity due to excess calories ([address] 8/27/2017    Pap smear 2012    total care for women.         Past Surgical History:   Procedure Laterality Date    HX HEENT  2 months old    tear duct stenosis    HX UROLOGICAL      one kidney a little small, but OK with maturation       Family History   Problem Relation Age of Onset    Hypertension Mother     Elevated Lipids Mother     Hypertension Maternal Grandmother     Elevated Lipids Maternal Grandmother     Stroke Maternal Grandmother     Hypertension Maternal Grandfather     Elevated Lipids Maternal Grandfather     Heart Disease Maternal Grandfather     Hypertension Paternal Grandmother    [facility] Elevated Lipids Paternal Grandmother     Hypertension Paternal Grandfather     Elevated Lipids Paternal Grandfather     Cancer Maternal Aunt         leukemia    Hypertension Paternal Aunt        Social History     Tobacco Use    Smoking status: Never Smoker    Smokeless tobacco: Never Used   Substance Use Topics    Alcohol use: Yes     Alcohol/week: 0.0 standard drinks     Comment: occasionally, socially       ROS   Review of Systems   Constitutional: Negative for chills and fever. HENT: Negative for ear pain and sore throat. Eyes: Negative for blurred vision and pain. Respiratory: Negative for cough and shortness of breath. Cardiovascular: Negative for chest pain. Gastrointestinal: Negative for abdominal pain, blood in stool and melena. Genitourinary: Negative for dysuria, hematuria and urgency. Musculoskeletal: Negative for joint pain and myalgias. Skin: Negative for rash. Neurological: Negative for tingling, focal weakness and headaches. Endo/Heme/Allergies: Does not bruise/bleed easily. Psychiatric/Behavioral: Negative for substance abuse. Objective     Vitals:    12/17/19 1156   BP: 126/84   Pulse: 74   Resp: 20   Temp: 98.9 °F (37.2 °C)   TempSrc: Oral   SpO2: 100%   Weight: 274 lb (124.3 kg)   Height: 5' 4\" (1.626 m)   PainSc:   0 - No pain       Physical Exam  Vitals signs and nursing note reviewed. HENT:      Head: Normocephalic and atraumatic. Right Ear: External ear normal.      Left Ear: External ear normal.      Nose: Nose normal.      Mouth/Throat:      Pharynx: No oropharyngeal exudate. Eyes:      General: No scleral icterus. Right eye: No discharge. Left eye: No discharge. Conjunctiva/sclera: Conjunctivae normal.   Neck:      Musculoskeletal: Neck supple. Cardiovascular:      Rate and Rhythm: Normal rate and regular rhythm. Heart sounds: Normal heart sounds. No murmur. No friction rub. No gallop.     Pulmonary:      Effort: Pulmonary effort is normal. No respiratory distress. Breath sounds: Normal breath sounds. No wheezing or rales. Chest:      Chest wall: No tenderness. Abdominal:      General: Bowel sounds are normal. There is no distension. Palpations: Abdomen is soft. There is no mass. Tenderness: There is no tenderness. There is no guarding or rebound. Musculoskeletal:         General: No tenderness (BUE). Lymphadenopathy:      Cervical: No cervical adenopathy. Skin:     General: Skin is warm and dry. Findings: No erythema. Neurological:      Mental Status: She is alert and oriented to person, place, and time. Motor: No abnormal muscle tone. Gait: Gait is intact. Psychiatric:         Mood and Affect: Mood and affect normal.         LABS   Data Review:   No results found for: WBC, WBCLT, HGBPOC, HGB, HGBP, HCTPOC, HCT, PHCT, RBCH, PLT, MCV, HGBEXT, HCTEXT, PLTEXT, HGBEXT, HCTEXT, PLTEXT    Lab Results   Component Value Date/Time    Sodium 138 01/16/2019 10:15 AM    Potassium 4.4 01/16/2019 10:15 AM    Chloride 104 01/16/2019 10:15 AM    CO2 30 01/16/2019 10:15 AM    Anion gap 13.0 08/04/2017 11:08 PM    Glucose 96 01/16/2019 10:15 AM    BUN 14 01/16/2019 10:15 AM    Creatinine 0.66 01/16/2019 10:15 AM    BUN/Creatinine ratio NOT APPLICABLE 05/38/3136 76:30 AM    GFR est  01/16/2019 10:15 AM    GFR est non- 01/16/2019 10:15 AM    Calcium 8.8 01/16/2019 10:15 AM       Lab Results   Component Value Date/Time    Cholesterol, total 154 02/21/2011 11:15 AM    HDL Cholesterol 47 02/21/2011 11:15 AM    LDL, calculated 88 02/21/2011 11:15 AM    VLDL, calculated 19 02/21/2011 11:15 AM    Triglyceride 97 02/21/2011 11:15 AM       Lab Results   Component Value Date/Time    Hemoglobin A1c 5.5 01/16/2019 10:15 AM    Hemoglobin A1c (POC) 5.8 12/24/2015 11:42 AM       Assessment/Plan:   1. Pre-diabetes: +Losing weight!!  - I encouraged her to do a low carb diet and to reduce her weight.  I will recheck her weight at her f/u apt.  - Checking an A1C    ORDERS:  - HEMOGLOBIN A1C W/O EAG; Future    2. Essential hypertension: Stable. - C/w rx as prescribed. - Checking labs. Orders are in. She just has to get them drawn. I encouraged her to get them drawn within the next month. There are no preventive care reminders to display for this patient. Lab review: labs are reviewed in the EHR and ordered as mentioned above    I have discussed the diagnosis with the patient and the intended plan as seen in the above orders. The patient has received an after-visit summary and questions were answered concerning future plans. I have discussed medication side effects and warnings with the patient as well. I have reviewed the plan of care with the patient, accepted their input and they are in agreement with the treatment goals. All questions were answered. The patient understands the plan of care. Handouts provided today with above information. Pt instructed if symptoms worsen to call the office or report to the ED for continued care. Greater than 50% of the visit time was spent in counseling and/or coordination of care. Voice recognition was used to generate this report, which may have resulted in some phonetic based errors in grammar and contents. Even though attempts were made to correct all the mistakes, some may have been missed, and remained in the body of the document.           Hector Koch MD

## 2019-12-17 NOTE — PROGRESS NOTES
Angeles Larry presents today for No chief complaint on file. Depression Screening:  3 most recent PHQ Screens 1/16/2019   Little interest or pleasure in doing things Not at all   Feeling down, depressed, irritable, or hopeless Not at all   Total Score PHQ 2 0       Learning Assessment:  Learning Assessment 1/16/2019   PRIMARY LEARNER Patient   HIGHEST LEVEL OF EDUCATION - PRIMARY LEARNER  SOME COLLEGE   BARRIERS PRIMARY LEARNER NONE   CO-LEARNER CAREGIVER No   CO-LEARNER HIGHEST LEVEL OF EDUCATION -   PRIMARY LANGUAGE ENGLISH   LEARNER PREFERENCE PRIMARY DEMONSTRATION   ANSWERED BY patient   RELATIONSHIP SELF       Abuse Screening:  Abuse Screening Questionnaire 1/16/2019   Do you ever feel afraid of your partner? N   Are you in a relationship with someone who physically or mentally threatens you? N   Is it safe for you to go home? Y       Fall Risk  No flowsheet data found. Coordination of Care:  1. Have you been to the ER, urgent care clinic since your last visit? Hospitalized since your last visit? no    2. Have you seen or consulted any other health care providers outside of the [address] [place] [address] since your last visit? Include any pap smears or colon screening. no      Advance Directive:  1. Do you have an advance directive in place? Patient Reply:no    2. If not, would you like material regarding how to put one in place?  Patient Reply: no

## 2019-12-17 NOTE — PATIENT INSTRUCTIONS
Body Mass Index: Care Instructions Your Care Instructions Body mass index (BMI) can help you see if your weight is raising your risk for health problems. It uses a formula to compare how much you weigh with how tall you are. · A BMI lower than 18.5 is considered underweight. · A BMI between 18.5 and 24.9 is considered healthy. · A BMI between 25 and 29.9 is considered overweight. A BMI of 30 or higher is considered obese. If your BMI is in the normal range, it means that you have a lower risk for weight-related health problems. If your BMI is in the overweight or obese range, you may be at increased risk for weight-related health problems, such as high blood pressure, heart disease, stroke, arthritis or joint pain, and diabetes. If your BMI is in the underweight range, you may be at increased risk for health problems such as fatigue, lower protection (immunity) against illness, muscle loss, bone loss, hair loss, and hormone problems. BMI is just one measure of your risk for weight-related health problems. You may be at higher risk for health problems if you are not active, you eat an unhealthy diet, or you drink too much alcohol or use tobacco products. Follow-up care is a key part of your treatment and safety. Be sure to make and go to all appointments, and call your doctor if you are having problems. It's also a good idea to know your test results and keep a list of the medicines you take. How can you care for yourself at home? · Practice healthy eating habits. This includes eating plenty of fruits, vegetables, whole grains, lean protein, and low-fat dairy. · If your doctor recommends it, get more exercise. Walking is a good choice. Bit by bit, increase the amount you walk every day. Try for at least 30 minutes on most days of the week. · Do not smoke. Smoking can increase your risk for health problems.  If you need help quitting, talk to your doctor about stop-smoking programs and medicines. These can increase your chances of quitting for good. · Limit alcohol to 2 drinks a day for men and 1 drink a day for women. Too much alcohol can cause health problems. If you have a BMI higher than 25 · Your doctor may do other tests to check your risk for weight-related health problems. This may include measuring the distance around your waist. A waist measurement of more than 40 inches in men or 35 inches in women can increase the risk of weight-related health problems. · Talk with your doctor about steps you can take to stay healthy or improve your health. You may need to make lifestyle changes to lose weight and stay healthy, such as changing your diet and getting regular exercise. If you have a BMI lower than 18.5 · Your doctor may do other tests to check your risk for health problems. · Talk with your doctor about steps you can take to stay healthy or improve your health. You may need to make lifestyle changes to gain or maintain weight and stay healthy, such as getting more healthy foods in your diet and doing exercises to build muscle. Where can you learn more? Go to http://rickie-manjit.info/. Enter S176 in the search box to learn more about \"Body Mass Index: Care Instructions. \" Current as of: March 28, 2019 Content Version: 12.2 © 5082-8838 [employer]. Care instructions adapted under license by [employer] (which disclaims liability or warranty for this information). If you have questions about a medical condition or this instruction, always ask your healthcare professional. [address] any warranty or liability for your use of this information.

## 2020-03-04 ENCOUNTER — EMPLOYEE WELLNESS (OUTPATIENT)
Dept: FAMILY MEDICINE CLINIC | Age: 35
End: 2020-03-04

## 2020-03-08 LAB
CHOLEST SERPL-MCNC: 172 MG/DL
COTININE SERPL-MCNC: 1.2 NG/ML
GLUCOSE SERPL-MCNC: 94 MG/DL (ref 74–99)
HDLC SERPL-MCNC: 38 MG/DL (ref 40–60)
LDLC SERPL CALC-MCNC: 108 MG/DL (ref 0–100)
NICOTINE SERPL-MCNC: ABNORMAL NG/ML
TRIGL SERPL-MCNC: 130 MG/DL (ref ?–150)

## 2020-05-14 DIAGNOSIS — E66.01 OBESITY, MORBID, BMI 50 OR HIGHER (HCC): ICD-10-CM

## 2020-05-14 DIAGNOSIS — I10 ESSENTIAL HYPERTENSION: ICD-10-CM

## 2020-05-14 RX ORDER — NIFEDIPINE 90 MG/1
TABLET, EXTENDED RELEASE ORAL
Qty: 90 TAB | Refills: 1 | Status: SHIPPED | OUTPATIENT
Start: 2020-05-14 | End: 2022-06-07 | Stop reason: SDUPTHER

## 2020-05-14 RX ORDER — LISINOPRIL 20 MG/1
TABLET ORAL
Qty: 90 TAB | Refills: 1 | Status: SHIPPED | OUTPATIENT
Start: 2020-05-14 | End: 2022-06-07 | Stop reason: SDUPTHER

## 2020-05-14 NOTE — TELEPHONE ENCOUNTER
PCP: Demetrice Leyva MD    Last appt: 12/17/2019  Future Appointments   Date Time Provider Georges Emmanuel   6/25/2020 12:30 PM Demetrice Leyva MD [address]       Requested Prescriptions     Pending Prescriptions Disp Refills    NIFEdipine ER (PROCARDIA XL) 90 mg ER tablet 90 Tab 1     Sig: TAKE ONE TABLET BY MOUTH ONCE DAILY    lisinopriL (PRINIVIL, ZESTRIL) 20 mg tablet 90 Tab 1     Sig: TAKE ONE TABLET BY MOUTH ONCE DAILY

## 2020-06-25 ENCOUNTER — VIRTUAL VISIT (OUTPATIENT)
Dept: INTERNAL MEDICINE CLINIC | Age: 35
End: 2020-06-25

## 2020-06-25 DIAGNOSIS — R73.03 PRE-DIABETES: ICD-10-CM

## 2020-06-25 DIAGNOSIS — I10 ESSENTIAL HYPERTENSION: Primary | ICD-10-CM

## 2020-06-25 NOTE — PROGRESS NOTES
Myron Joyce is a 38 y.o. female who was seen by synchronous (real-time) audio-video technology on 6/25/2020. Assessment & Plan:   Hypertension: Stable. Continue with Rx as prescribed. I encouraged her to get a BMP and urine protein screen, ordered today to assess her renal function and electrolytes.  I will follow-up with her in the spring 2021. I will check an A1c, lipid panel, BMP, and a urine protein screen at that time as well. Continue with home BP checks. I instructed her to notify me if her BP is increasing. Lab review: labs are reviewed in the EHR and ordered as mentioned above. I have discussed the diagnosis with the patient and the intended plan as seen in the above orders. I have discussed medication side effects and warnings with the patient as well. I have reviewed the plan of care with the patient, accepted their input and they are in agreement with the treatment goals. All questions were answered. The patient understands the plan of care. Pt instructed if symptoms worsen to call the office or report to the ED for continued care. Greater than 50% of the visit time was spent in counseling and/or coordination of care. Voice recognition was used to generate this report, which may have resulted in some phonetic based errors in grammar and contents. Even though attempts were made to correct all the mistakes, some may have been missed, and remained in the body of the document. Subjective: Myron Joyce was seen for   Chief Complaint   Patient presents with    Follow-up     The patient is a 13-year-old female with history of obesity, prediabetes, and hypertension.     HTN: BP checks: yes; 133/82 was her BP today. Taking nifedipine and lisinopril. Weight: 268lbs. Diet: she is working on losing weight with her . Her weight at her last apt per the EHR: 274lbs. Overdue for a BMP and urine protein screen. Her LDL was 108 when checked in March.   She has a history of prediabetes but her fasting blood sugar earlier this year was within normal limits. Prior to Admission medications    Medication Sig Start Date End Date Taking? Authorizing Provider   NIFEdipine ER (PROCARDIA XL) 90 mg ER tablet TAKE ONE TABLET BY MOUTH ONCE DAILY 5/14/20   Jimi Lerma MD   lisinopriL (PRINIVIL, ZESTRIL) 20 mg tablet TAKE ONE TABLET BY MOUTH ONCE DAILY 5/14/20   Jimi Lerma MD   levonorgestrel (MIRENA) 20 mcg/24 hr IUD 1 Each by IntraUTERine route once. Provider, Historical     No Known Allergies  Past Medical History:   Diagnosis Date    HTN (hypertension) 1/27/2011    Morbid obesity due to excess calories ([facility] [address] 8/27/2017    Pap smear 2012    total care for women. Past Surgical History:   Procedure Laterality Date    HX HEENT  2 months old    tear duct stenosis    HX UROLOGICAL      one kidney a little small, but OK with maturation     Family History   Problem Relation Age of Onset    Hypertension Mother    [facility] Elevated Lipids Mother     Hypertension Maternal Grandmother     Elevated Lipids Maternal Grandmother     Stroke Maternal Grandmother     Hypertension Maternal Grandfather     Elevated Lipids Maternal Grandfather     Heart Disease Maternal Grandfather     Hypertension Paternal Grandmother     Elevated Lipids Paternal Grandmother     Hypertension Paternal Grandfather     Elevated Lipids Paternal Grandfather     Cancer Maternal Aunt         leukemia    Hypertension Paternal Aunt      Social History     Socioeconomic History    Marital status: SINGLE     Spouse name: Not on file    Number of children: Not on file    Years of education: Not on file    Highest education level: Not on file   Occupational History    Occupation: psr     Employer: [employer]   Tobacco Use    Smoking status: Never Smoker    Smokeless tobacco: Never Used   Substance and Sexual Activity    Alcohol use:  Yes     Alcohol/week: 0.0 standard drinks Comment: occasionally, socially    Drug use: Yes     Types: Prescription, OTC    Sexual activity: Yes     Partners: Male     Birth control/protection: I.U.D. ROS:  Gen: No fever/chills  HEENT: No sore throat, eye pain, ear pain, or congestion.  No HA  CV: No CP  Resp: No cough/SOB  GI: No abdominal pain  : No hematuria/dysuria  Derm: No rash  Neuro: No new paresthesias/weakness  Musc: No new myalgias/jt pain  Psych: No depression sx      Objective:     General: alert, cooperative, no distress   Mental  status: mental status: alert, oriented to person, place, and time, normal mood, behavior, speech, dress, motor activity, and thought processes   Resp: resp: normal effort and no respiratory distress   Neuro: neuro: no gross deficits   Skin: skin: no discoloration or lesions of concern on visible areas     LABS:  Lab Results   Component Value Date/Time    Sodium 138 01/16/2019 10:15 AM    Potassium 4.4 01/16/2019 10:15 AM    Chloride 104 01/16/2019 10:15 AM    CO2 30 01/16/2019 10:15 AM    Anion gap 13.0 08/04/2017 11:08 PM    Glucose 94 03/04/2020 09:23 AM    BUN 14 01/16/2019 10:15 AM    Creatinine 0.66 01/16/2019 10:15 AM    BUN/Creatinine ratio NOT APPLICABLE 80/31/0942 24:15 AM    GFR est  01/16/2019 10:15 AM    GFR est non- 01/16/2019 10:15 AM    Calcium 8.8 01/16/2019 10:15 AM       Lab Results   Component Value Date/Time    Cholesterol, total 172 03/04/2020 09:23 AM    HDL Cholesterol 38 (L) 03/04/2020 09:23 AM    LDL, calculated 108 (H) 03/04/2020 09:23 AM    VLDL, calculated 19 02/21/2011 11:15 AM    Triglyceride 130 03/04/2020 09:23 AM       No results found for: WBC, WBCLT, HGBPOC, HGB, HGBP, HCTPOC, HCT, PHCT, RBCH, PLT, MCV, HGBEXT, HCTEXT, PLTEXT    Lab Results   Component Value Date/Time    Hemoglobin A1c 5.5 01/16/2019 10:15 AM    Hemoglobin A1c (POC) 5.8 12/24/2015 11:42 AM       Lab Results   Component Value Date/Time    TSH 3.000 02/21/2011 11:15 AM           Due to this being a TeleHealth  evaluation, many elements of the physical examination are unable to be assessed. The pt was seen by synchronous (real-time) audio-video technology, and/or her healthcare decision maker, is aware that this patient-initiated, Telehealth encounter is a billable service, with coverage as determined by her insurance carrier. She is aware that she may receive a bill and has provided verbal consent to proceed: Yes. The pt is being evaluated by a video visit encounter for concerns as above. A caregiver was present when appropriate. Due to this being a TeleHealth encounter (During NLWKP-03 public health emergency), evaluation of the following organ systems was limited: Vitals/Constitutional/EENT/Resp/CV/GI//MS/Neuro/Skin/Heme-Lymph-Imm. Pursuant to the emergency declaration under the [facility] waiver authority and the [address] and [employer] Act, this Virtual  Visit was conducted, with patient's (and/or legal guardian's) consent, to reduce the patient's risk of exposure to COVID-19 and provide necessary medical care. Services were provided through a video synchronous discussion virtually to substitute for in-person clinic visit. Patient and provider were located at their individual homes. We discussed the expected course, resolution and complications of the diagnosis(es) in detail. Medication risks, benefits, costs, interactions, and alternatives were discussed as indicated. I advised her to contact the office if her condition worsens, changes or fails to improve as anticipated. She expressed understanding with the diagnosis(es) and plan.      Froylan Lombard, MD

## 2020-06-30 ENCOUNTER — TELEPHONE (OUTPATIENT)
Dept: INTERNAL MEDICINE CLINIC | Age: 35
End: 2020-06-30

## 2020-06-30 NOTE — TELEPHONE ENCOUNTER
----- Message from Romi Tristan MD sent at 6/25/2020 12:51 PM EDT -----  Regarding: F/u apts needed  Please schedule the patient for a follow-up 30-minute appointment with me in April 2021. Please have her get labs 1 week before her follow-up appointment.     Thanks,  Dr. Nacho Tejada  [facility], [address] [place] [address], [address] [place] [address] Str.  Phone: [phone]  Fax: [phone]

## 2020-10-20 ENCOUNTER — NURSE TRIAGE (OUTPATIENT)
Dept: OTHER | Facility: CLINIC | Age: 35
End: 2020-10-20

## 2020-10-20 NOTE — TELEPHONE ENCOUNTER
Reason for Disposition   Mild-moderate headache    Answer Assessment - Initial Assessment Questions  1. LOCATION: \"Where does it hurt? \"       [place]    2. ONSET: \"When did the headache start? \" (Minutes, hours or days)   Yesterday evening    3. PATTERN: \"Does the pain come and go, or has it been constant since it started? \"  Comes and goes    4. SEVERITY: \"How bad is the pain? \" and \"What does it keep you from doing? \"  (e.g., Scale 1-10; mild, moderate, or severe)    - MILD (1-3): doesn't interfere with normal activities     - MODERATE (4-7): interferes with normal activities or awakens from sleep     - SEVERE (8-10): excruciating pain, unable to do any normal activities     Moderate    5. RECURRENT SYMPTOM: \"Have you ever had headaches before? \" If so, ask: \"When was the last time? \" and \"What happened that time? \"   No    6. CAUSE: \"What do you think is causing the headache? \"  Unsure    7. MIGRAINE: \"Have you been diagnosed with migraine headaches? \" If so, ask: \"Is this headache similar? \"   no    8. HEAD INJURY: \"Has there been any recent injury to the head? \"   No    9. OTHER SYMPTOMS: \"Do you have any other symptoms? \" (fever, stiff neck, eye pain, sore throat, cold symptoms)  Fever    10. PREGNANCY: \"Is there any chance you are pregnant? \" \"When was your last menstrual period? \"  No, iud    Protocols used: HEADACHE-ADULT-[place]

## 2020-10-29 ENCOUNTER — OFFICE VISIT (OUTPATIENT)
Dept: FAMILY MEDICINE CLINIC | Age: 35
End: 2020-10-29
Payer: COMMERCIAL

## 2020-10-29 ENCOUNTER — TELEPHONE (OUTPATIENT)
Dept: INTERNAL MEDICINE CLINIC | Age: 35
End: 2020-10-29

## 2020-10-29 ENCOUNTER — VIRTUAL VISIT (OUTPATIENT)
Dept: INTERNAL MEDICINE CLINIC | Age: 35
End: 2020-10-29

## 2020-10-29 ENCOUNTER — HOSPITAL ENCOUNTER (OUTPATIENT)
Dept: LAB | Age: 35
Discharge: HOME | End: 2020-10-29
Payer: COMMERCIAL

## 2020-10-29 VITALS
RESPIRATION RATE: 14 BRPM | SYSTOLIC BLOOD PRESSURE: 118 MMHG | HEART RATE: 142 BPM | OXYGEN SATURATION: 96 % | TEMPERATURE: 100.5 F | DIASTOLIC BLOOD PRESSURE: 80 MMHG

## 2020-10-29 DIAGNOSIS — R50.9 FEVER, UNSPECIFIED FEVER CAUSE: Primary | ICD-10-CM

## 2020-10-29 DIAGNOSIS — R53.83 FATIGUE, UNSPECIFIED TYPE: ICD-10-CM

## 2020-10-29 DIAGNOSIS — J02.9 SORE THROAT: ICD-10-CM

## 2020-10-29 DIAGNOSIS — J02.9 SORE THROAT: Primary | ICD-10-CM

## 2020-10-29 DIAGNOSIS — R19.7 DIARRHEA, UNSPECIFIED TYPE: ICD-10-CM

## 2020-10-29 DIAGNOSIS — J02.0 STREP PHARYNGITIS: ICD-10-CM

## 2020-10-29 PROCEDURE — 87804 INFLUENZA ASSAY W/OPTIC: CPT | Performed by: NURSE PRACTITIONER

## 2020-10-29 PROCEDURE — 87635 SARS-COV-2 COVID-19 AMP PRB: CPT

## 2020-10-29 PROCEDURE — 87880 STREP A ASSAY W/OPTIC: CPT | Performed by: NURSE PRACTITIONER

## 2020-10-29 PROCEDURE — 99213 OFFICE O/P EST LOW 20 MIN: CPT | Performed by: NURSE PRACTITIONER

## 2020-10-29 RX ORDER — AMOXICILLIN 500 MG/1
500 CAPSULE ORAL 2 TIMES DAILY
Qty: 20 CAP | Refills: 0 | Status: SHIPPED | OUTPATIENT
Start: 2020-10-29 | End: 2020-11-08

## 2020-10-29 NOTE — TELEPHONE ENCOUNTER
----- Message from Gutierrez Awad MD sent at 10/29/2020  1:41 PM EDT -----  Regarding: [facility] apt needed today or tomorrow! Please call patient back and have her scheduled for an appointment with the provider at the [facility] either today or tomorrow. The patient stated that she can make it this afternoon if we are able to get her an appointment this afternoon. She can also of course come in tomorrow to the [facility]. She is a [employer] employee with a persistent fever (see my note). She's stable of course but has been going through this for now >1 wk. I suspect her first Covid test (per Occup[facility]) was falsely negative or that she has either influenza or strep. Please assist with this.       Respectfully,  Dr. Shaylee Corley  [facility], [address] [place] [address], [facility] [address]  Phone: [phone]  Fax: [phone]

## 2020-10-29 NOTE — LETTER
NOTIFICATION RETURN TO WORK / SCHOOL 
 
10/29/2020 3:15 PM 
 
Ms. Steve Chong [address] [place] [address] To Whom It May Concern: 
 
Steve Chong is currently under the care of Susan1 JYOTHI Price. Patient under quarantine until 11/05/2020. If there are questions or concerns please have the patient contact our office. Sincerely, Srinath Busch NP

## 2020-10-29 NOTE — TELEPHONE ENCOUNTER
Patient is aware she has been referred to [facility] for evaluation. Spoke with MAUREEN THRASHER at the [facility] and patient was scheduled for today. Patient states the last time she went she just stayed in the car. Informed patient there are no orders from PCP for COVID testing. Patient would have to be seen by a provider to get the testing done and to be evaluated. Patient verbalizes understanding.

## 2020-10-29 NOTE — PROGRESS NOTES
Blayne Hannah presents today for   Chief Complaint   Patient presents with    Fever     diarrhea    Fatigue    Generalized Body Aches    Sore Throat    Rash       Is someone accompanying this pt? no    Is the patient using any DME equipment during OV? no    Travel and Exposure Screening was performed during check in or rooming process Yes  No      Depression Screening:  3 most recent PHQ Screens 10/29/2020   Little interest or pleasure in doing things Not at all   Feeling down, depressed, irritable, or hopeless Not at all   Total Score PHQ 2 0       Fall Risk  No flowsheet data found.     This Visit Test  Results for orders placed or performed in visit on 03/04/20   [facility] SCREEN   Result Value Ref Range    Glucose 94 74 - 99 mg/dL    Cholesterol, total 172 <200 MG/DL    Triglyceride 130 <150 MG/DL    HDL Cholesterol 38 (L) 40 - 60 MG/DL    LDL, calculated 108 (H) 0 - 100 MG/DL    Nicotine None detected ng/mL    Cotinine 1.2 ng/mL

## 2020-10-29 NOTE — PROGRESS NOTES
SUBJECTIVE:  Chief Complaint   Patient presents with    Fever     diarrhea    Fatigue    Generalized Body Aches    Sore Throat    Rash         Patient denies recent travel. Pt exposed to sick contacts under investigations for possible Covid NO. 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 Onset 10/19/20, with fever and body aches. Was tested for covid, sample taken 10/20/20,  results were negative. Since then fevers have continued mostly at night. Sore throat started 10/28/20. She has had some diarrhea but none today. No appetite. Patient reported a slight non-raised rash on her forearms seen better in the shower. Yesterday she felt well but at 4 pm her face felt hot, was running a fever and felt fatigued again. Her immediate family is not sick. She is taking ibuprofen. She denies shortness of breath or chest pain. Pt is not a current smoker. OBJECTIVE    Visit Vitals  /80   Pulse (!) 142   Temp (!) 100.5 °F (38.1 °C) (Oral)   Resp 14   SpO2 96%      General:  healthy, alert, well developed, well nourished, cooperative and in no apparent distress. Sick but not toxic appearing. Eyes:   The lids are without swelling, lesions, or drainage. The conjunctiva is clear and noninjected. ENT:  ENT exam normal, no neck nodes or sinus tenderness, bilateral TM normal without fluid or infection, throat normal without erythema or exudate, airway not compromised, sinuses nontender and no evidence of ectopic thyroid. Neck: normal, supple and no adenopathy. Lungs/CV: clear to auscultation, no wheezes or rales and unlabored breathing. Heart: regular rhythm, tachycardic rate. Skin:  No rashes, no jaundice. I did not see a rash on patient's forearms. No rash on face, chest or hands. Nurse, Ms. Wilcox Erick reported a slight rash on forearms the patient mentioned. ASSESSMENT / PLAN     ICD-10-CM ICD-9-CM    1.  Sore throat  J02.9 462 AMB POC RAPID INFLUENZA TEST      AMB POC RAPID STREP A NOVEL CORONAVIRUS (COVID-19)   2. Diarrhea, unspecified type  R19.7 787.91 AMB POC RAPID INFLUENZA TEST      AMB POC RAPID STREP A      NOVEL CORONAVIRUS (COVID-19)   3. Fatigue, unspecified type  R53.83 780.79 AMB POC RAPID INFLUENZA TEST      AMB POC RAPID STREP A      NOVEL CORONAVIRUS (COVID-19)   4. Strep pharyngitis  J02.0 034.0 amoxicillin (AMOXIL) 500 mg capsule       Positive for strep. Negative for flu. Based on CDC recommendations and limited testing supplies, only those patients who meet criteria will be tested for Covid. High priority groups for testing   Symptomatic and/or Exposure /Test for Covid  Immunocompromised host (on prednisone, biological therapy, blood cancer, metastatic cancer or active chemotherapy)   [facility] worker in the home    Other high-risk group: o age >47   o Uncontrolled DM   o Uncontrolled HTN   o BMI >40, CKD/ESRD    Dialysis patients (patients going to HD units, not asymptomatic home HD/PD)    Anyone living in a congregate setting     Non High-risk patient category           Test for COVID-19   Asymptomatic, no known exposure  No    Asymptomatic, possible exposure  No    Asymptomatic, definite exposure  Provider discretion    Symptomatic, no known exposure  Yes    Symptomatic, + exposure  Yes      Patient does  meet criteria for Covid testing. COVID-19 testing was completed. Work note was given until "[employer]," are available. If Covid testing was completed and is negative, patient may return back to work despite quarantine originally set in place if applicable. Instructed pt on the importance of rest, fluid intake (with avoidance of red fluids), zinc and vit C supplements. Instructed pt to check temp if possible and to take acetaminophen or NSAIDs if fevers are noted. Instructed patient to remember to wash hands, disinfect surroundings, and avoid touching face.  Instructed pt to remain home and and self quarantine until Covid results are negative and all symptoms have improved or subsided. If they must leave home, wear a mask. Patient verbalized understanding. We have provided the patient with a detailed after visit summary which was reviewed, and red flag symptoms that would warrant an ER visit were emphasized. CC'd chart to PCP: Yes: Comment: MD Esa Carlisle, NP-C    This visit was provided as a focused evaluation during the COVID -19 pandemic/national emergency. A comprehensive review of all previous patient history and testing was not conducted. Pertinent findings were elicited during the visit.

## 2020-10-29 NOTE — PROGRESS NOTES
Hamida Valles is a 38 y.o. female who was seen by synchronous (real-time) audio-video technology on 10/29/2020. Assessment & Plan:   Fever: Needs to be retested for Covid-19. I would also recommend testing for influenza and strep pharyngitis. - Referral to our \"[facility]\"  - Supportive care. Ok to c/w tylenol and motrin prn.  - Notify me if sx worsen      Lab review: labs are reviewed in the EHR     I have discussed the diagnosis with the patient and the intended plan as seen in the above orders. I have discussed medication side effects and warnings with the patient as well. I have reviewed the plan of care with the patient, accepted their input and they are in agreement with the treatment goals. All questions were answered. The patient understands the plan of care. Pt instructed if symptoms worsen to call the office or report to the ED for continued care. Greater than 50% of the visit time was spent in counseling and/or coordination of care. Voice recognition was used to generate this report, which may have resulted in some phonetic based errors in grammar and contents. Even though attempts were made to correct all the mistakes, some may have been missed, and remained in the body of the document. Subjective: Hamida Valles was seen for   Chief Complaint   Patient presents with    Fever     The patient is a 58-year-old female with history of obesity, prediabetes, and hypertension. Fever:  Duration: since last Monday. Temperature was 102.9 on last Monday. She was sent to [facility]. She had a Covid-19 test on Monday and her results were negative. Her fever continued until Friday. Sick contacts: none. She took motrin for the fever. Her fever returned Saturday. Monday and Tuesday, she had diarrhea and some abdominal cramping. These sx resolved. No bleeding. She developed a sore throat on Monday. Sx felt better yesterday. Last night her fever/temperature is 101. No SOB.  No loss of taste/smell. +Fatigue. +Nausea and lightheadedness off/on. Temperature is 100.6 just before her apt. No ear pain or eye pain. Prior to Admission medications    Medication Sig Start Date End Date Taking? Authorizing Provider   NIFEdipine ER (PROCARDIA XL) 90 mg ER tablet TAKE ONE TABLET BY MOUTH ONCE DAILY 5/14/20   Cyndy Doll MD   lisinopriL (PRINIVIL, ZESTRIL) 20 mg tablet TAKE ONE TABLET BY MOUTH ONCE DAILY 5/14/20   Cyndy Doll MD   levonorgestrel (MIRENA) 20 mcg/24 hr IUD 1 Each by IntraUTERine route once. Provider, Historical     No Known Allergies  Past Medical History:   Diagnosis Date    HTN (hypertension) 1/27/2011    Morbid obesity due to excess calories ([facility] [address] 8/27/2017    Pap smear 2012    total care for women. Past Surgical History:   Procedure Laterality Date    HX HEENT  2 months old    tear duct stenosis    HX UROLOGICAL      one kidney a little small, but OK with maturation     Family History   Problem Relation Age of Onset    Hypertension Mother    [place] Elevated Lipids Mother     Hypertension Maternal Grandmother     Elevated Lipids Maternal Grandmother     Stroke Maternal Grandmother     Hypertension Maternal Grandfather     Elevated Lipids Maternal Grandfather     Heart Disease Maternal Grandfather     Hypertension Paternal Grandmother     Elevated Lipids Paternal Grandmother     Hypertension Paternal Grandfather     Elevated Lipids Paternal Grandfather     Cancer Maternal Aunt         leukemia    Hypertension Paternal Aunt      Social History     Socioeconomic History    Marital status: SINGLE     Spouse name: Not on file    Number of children: Not on file    Years of education: Not on file    Highest education level: Not on file   Occupational History    Occupation: psr     Employer: [employer]   Tobacco Use    Smoking status: Never Smoker    Smokeless tobacco: Never Used   Substance and Sexual Activity    Alcohol use:  Yes Alcohol/week: 0.0 standard drinks     Comment: occasionally, socially    Drug use: Yes     Types: Prescription, OTC    Sexual activity: Yes     Partners: Male     Birth control/protection: I.U.D. ROS:  Gen: No fever/chills  HEENT: No sore throat, eye pain, ear pain, or congestion.  No HA  CV: No CP  Resp: No cough/SOB  GI: No abdominal pain  : No hematuria/dysuria  Derm: No rash  Neuro: No new paresthesias/weakness  Musc: No new myalgias/jt pain  Psych: No depression sx      Objective:     General: alert, cooperative, no distress   Mental  status: mental status: alert, oriented to person, place, and time, normal mood, behavior, speech, dress, motor activity, and thought processes   Resp: resp: normal effort and no respiratory distress   Neuro: neuro: no gross deficits   Skin: skin: no discoloration or lesions of concern on visible areas     LABS:  Lab Results   Component Value Date/Time    Sodium 138 01/16/2019 10:15 AM    Potassium 4.4 01/16/2019 10:15 AM    Chloride 104 01/16/2019 10:15 AM    CO2 30 01/16/2019 10:15 AM    Anion gap 13.0 08/04/2017 11:08 PM    Glucose 94 03/04/2020 09:23 AM    BUN 14 01/16/2019 10:15 AM    Creatinine 0.66 01/16/2019 10:15 AM    BUN/Creatinine ratio NOT APPLICABLE 18/69/2866 91:70 AM    GFR est  01/16/2019 10:15 AM    GFR est non- 01/16/2019 10:15 AM    Calcium 8.8 01/16/2019 10:15 AM       Lab Results   Component Value Date/Time    Cholesterol, total 172 03/04/2020 09:23 AM    HDL Cholesterol 38 (L) 03/04/2020 09:23 AM    LDL, calculated 108 (H) 03/04/2020 09:23 AM    VLDL, calculated 19 02/21/2011 11:15 AM    Triglyceride 130 03/04/2020 09:23 AM       No results found for: WBC, WBCLT, HGBPOC, HGB, HGBP, HCTPOC, HCT, PHCT, RBCH, PLT, MCV, HGBEXT, HCTEXT, PLTEXT, HGBEXT, HCTEXT, PLTEXT    Lab Results   Component Value Date/Time    Hemoglobin A1c 5.5 01/16/2019 10:15 AM    Hemoglobin A1c (POC) 5.8 12/24/2015 11:42 AM       Lab Results   Component Value Date/Time TSH 3.000 02/21/2011 11:15 AM           Due to this being a TeleHealth  evaluation, many elements of the physical examination are unable to be assessed. The pt was seen by synchronous (real-time) audio-video technology, and/or her healthcare decision maker, is aware that this patient-initiated, Telehealth encounter is a billable service, with coverage as determined by her insurance carrier. She is aware that she may receive a bill and has provided verbal consent to proceed: Yes. The pt is being evaluated by a video visit encounter for concerns as above. A caregiver was present when appropriate. Due to this being a TeleHealth encounter (During [facility] emergency), evaluation of the following organ systems was limited: Vitals/Constitutional/EENT/Resp/CV/GI//MS/Neuro/Skin/Heme-Lymph-Imm. Pursuant to the emergency declaration under the [address] [place] [address] waiver authority and the [email or web address] and [employer] Act, this Virtual  Visit was conducted, with patient's (and/or legal guardian's) consent, to reduce the patient's risk of exposure to COVID-19 and provide necessary medical care. Services were provided through a video synchronous discussion virtually to substitute for in-person clinic visit. Patient and provider were located at their individual homes. We discussed the expected course, resolution and complications of the diagnosis(es) in detail. Medication risks, benefits, costs, interactions, and alternatives were discussed as indicated. I advised her to contact the office if her condition worsens, changes or fails to improve as anticipated. She expressed understanding with the diagnosis(es) and plan.      April Cheney MD

## 2020-10-30 LAB
FLUAV+FLUBV AG NOSE QL IA.RAPID: NEGATIVE POS/NEG
FLUAV+FLUBV AG NOSE QL IA.RAPID: NEGATIVE POS/NEG
S PYO AG THROAT QL: POSITIVE
VALID INTERNAL CONTROL?: YES
VALID INTERNAL CONTROL?: YES

## 2020-11-01 LAB — SARS-COV-2, COV2NT: NOT DETECTED

## 2021-08-10 LAB
CHOLEST SERPL-MCNC: 173 MG/DL
GLUCOSE SERPL-MCNC: 93 MG/DL (ref 74–99)
HDLC SERPL-MCNC: 49 MG/DL (ref 40–60)
LDLC SERPL CALC-MCNC: 111.8 MG/DL (ref 0–100)
TRIGL SERPL-MCNC: 61 MG/DL (ref ?–150)

## 2022-03-19 PROBLEM — E66.01 MORBID OBESITY DUE TO EXCESS CALORIES (HCC): Status: ACTIVE | Noted: 2017-08-27

## 2022-06-07 ENCOUNTER — APPOINTMENT (OUTPATIENT)
Dept: INTERNAL MEDICINE CLINIC | Age: 37
End: 2022-06-07

## 2022-06-07 ENCOUNTER — OFFICE VISIT (OUTPATIENT)
Dept: INTERNAL MEDICINE CLINIC | Age: 37
End: 2022-06-07
Payer: COMMERCIAL

## 2022-06-07 VITALS
WEIGHT: 293 LBS | BODY MASS INDEX: 50.02 KG/M2 | HEIGHT: 64 IN | TEMPERATURE: 97.9 F | HEART RATE: 85 BPM | OXYGEN SATURATION: 99 % | DIASTOLIC BLOOD PRESSURE: 86 MMHG | RESPIRATION RATE: 18 BRPM | SYSTOLIC BLOOD PRESSURE: 144 MMHG

## 2022-06-07 DIAGNOSIS — I10 PRIMARY HYPERTENSION: Primary | ICD-10-CM

## 2022-06-07 DIAGNOSIS — E66.01 OBESITY, MORBID, BMI 50 OR HIGHER (HCC): ICD-10-CM

## 2022-06-07 DIAGNOSIS — R73.03 PRE-DIABETES: ICD-10-CM

## 2022-06-07 PROCEDURE — 99214 OFFICE O/P EST MOD 30 MIN: CPT | Performed by: INTERNAL MEDICINE

## 2022-06-07 RX ORDER — NIFEDIPINE 90 MG/1
TABLET, EXTENDED RELEASE ORAL
Qty: 90 TABLET | Refills: 1 | Status: SHIPPED | OUTPATIENT
Start: 2022-06-07

## 2022-06-07 RX ORDER — LISINOPRIL 20 MG/1
TABLET ORAL
Qty: 90 TABLET | Refills: 1 | Status: SHIPPED | OUTPATIENT
Start: 2022-06-07

## 2022-06-07 NOTE — PROGRESS NOTES
Sharonda Moss presents today for   Chief Complaint   Patient presents with    Follow-up    Hypertension     w/o bp meds for over a year    Medication Refill         1. \"Have you been to the ER, urgent care clinic since your last visit? Hospitalized since your last visit? \" no    2. \"Have you seen or consulted any other health care providers outside of the [address] [place] [address] since your last visit? \" yes     3. For patients aged 39-70: Has the patient had a colonoscopy / FIT/ Cologuard? NA - based on age      If the patient is female:    4. For patients aged 41-77: Has the patient had a mammogram within the past 2 years? NA - based on age or sex  See top three    5. For patients aged 21-65: Has the patient had a pap smear?  No

## 2022-06-07 NOTE — PROGRESS NOTES
[facility] [place]:  6/7/2022, MRN: [id number]      Charlie Pascal is a 44 y.o. female and presents to clinic for Follow-up, Hypertension (w/o bp meds for over a year), and Medication Refill      Subjective: The patient is a 58-year-old female with history of obesity, prediabetes, and hypertension. 1.  Hypertension: Treated with lisinopril and nifedipine in the past.  Today she reports:she ran out of her meds >1 yr ago. BP: 144/86. She grows cannabis and uses it. She smokes it. 2.  Prediabetes: Her weight today is 312lbs. Overdue for labs. Wt Readings from Last 3 Encounters:   06/07/22 312 lb (141.5 kg)   12/17/19 274 lb (124.3 kg)   06/12/19 289 lb 9.6 oz (131.4 kg)     3. Health Maintenance:  - Last PAP: 4 yrs ago at the time her IUD was placed. No abdominal pain. - Menses: She spots rarely as she has an IUD. Patient Active Problem List    Diagnosis Date Noted    Morbid obesity due to excess calories ([address] 08/27/2017    Pre-diabetes 12/24/2015    HTN (hypertension) 01/27/2011       Current Outpatient Medications   Medication Sig Dispense Refill    NIFEdipine ER (PROCARDIA XL) 90 mg ER tablet TAKE ONE TABLET BY MOUTH ONCE DAILY 90 Tab 1    lisinopriL (PRINIVIL, ZESTRIL) 20 mg tablet TAKE ONE TABLET BY MOUTH ONCE DAILY 90 Tab 1    levonorgestrel (MIRENA) 20 mcg/24 hr IUD 1 Each by IntraUTERine route once. No Known Allergies    Past Medical History:   Diagnosis Date    HTN (hypertension) 1/27/2011    Morbid obesity due to excess calories ([address] 8/27/2017    Pap smear 2012    total care for women.         Past Surgical History:   Procedure Laterality Date    HX HEENT  2 months old    tear duct stenosis    HX UROLOGICAL      one kidney a little small, but OK with maturation       Family History   Problem Relation Age of Onset    Hypertension Mother     Elevated Lipids Mother     Hypertension Maternal Grandmother     Elevated Lipids Maternal Grandmother     Stroke Maternal Grandmother     Hypertension Maternal Grandfather     Elevated Lipids Maternal Grandfather     Heart Disease Maternal Grandfather     Hypertension Paternal Grandmother     Elevated Lipids Paternal Grandmother     Hypertension Paternal Grandfather     Elevated Lipids Paternal Grandfather     Cancer Maternal Aunt         leukemia    Hypertension Paternal Aunt        Social History     Tobacco Use    Smoking status: Never Smoker    Smokeless tobacco: Never Used   Substance Use Topics    Alcohol use: Yes     Alcohol/week: 3.0 standard drinks     Types: 3 Standard drinks or equivalent per week     Comment: occasionally, socially       ROS   Review of Systems   Constitutional: Negative for chills and fever. HENT: Negative for ear pain and sore throat. Eyes: Negative for blurred vision and pain. Respiratory: Negative for cough and shortness of breath. Cardiovascular: Negative for chest pain. Gastrointestinal: Negative for abdominal pain, blood in stool and melena. Genitourinary: Negative for dysuria and hematuria. Musculoskeletal: Negative for joint pain and myalgias. Skin: Negative for rash. Neurological: Negative for headaches. Endo/Heme/Allergies: Does not bruise/bleed easily. Psychiatric/Behavioral: Negative for substance abuse. Objective     Vitals:    06/07/22 0846 06/07/22 0850   BP: (!) 154/87 (!) 144/86   Pulse: 85    Resp: 18    Temp: 97.9 °F (36.6 °C)    TempSrc: Temporal    SpO2: 99%    Weight: 312 lb (141.5 kg)    Height: 5' 4\" (1.626 m)    PainSc:   0 - No pain        Physical Exam  Vitals and nursing note reviewed. HENT:      Head: Normocephalic and atraumatic. Right Ear: External ear normal.      Left Ear: External ear normal.   Eyes:      General: No scleral icterus. Right eye: No discharge. Left eye: No discharge. Conjunctiva/sclera: Conjunctivae normal.   Cardiovascular:      Rate and Rhythm: Normal rate and regular rhythm. Heart sounds: Normal heart sounds. No murmur heard. No friction rub. No gallop. Pulmonary:      Effort: Pulmonary effort is normal. No respiratory distress. Breath sounds: Normal breath sounds. No wheezing or rales. Abdominal:      General: Bowel sounds are normal. There is no distension. Palpations: Abdomen is soft. There is no mass. Tenderness: There is no abdominal tenderness. There is no guarding or rebound. Musculoskeletal:         General: No swelling (BUE) or tenderness (BUE). Cervical back: Neck supple. Lymphadenopathy:      Cervical: No cervical adenopathy. Skin:     General: Skin is warm and dry. Findings: No erythema or rash. Neurological:      Mental Status: She is alert. Motor: No abnormal muscle tone. Gait: Gait normal.   Psychiatric:         Mood and Affect: Mood normal.         LABS   Data Review:   No results found for: WBC, WBCLT, HGBPOC, HGB, HGBP, HCTPOC, HCT, PHCT, RBCH, PLT, MCV, HGBEXT, HCTEXT, PLTEXT, HGBEXT, HCTEXT, PLTEXT    Lab Results   Component Value Date/Time    Sodium 138 01/16/2019 10:15 AM    Potassium 4.4 01/16/2019 10:15 AM    Chloride 104 01/16/2019 10:15 AM    CO2 30 01/16/2019 10:15 AM    Anion gap 13.0 08/04/2017 11:08 PM    Glucose 93 08/10/2021 09:08 AM    BUN 14 01/16/2019 10:15 AM    Creatinine 0.66 01/16/2019 10:15 AM    BUN/Creatinine ratio NOT APPLICABLE 44/19/7635 53:09 AM    GFR est  01/16/2019 10:15 AM    GFR est non- 01/16/2019 10:15 AM    Calcium 8.8 01/16/2019 10:15 AM       Lab Results   Component Value Date/Time    Cholesterol, total 173 08/10/2021 09:08 AM    HDL Cholesterol 49 08/10/2021 09:08 AM    LDL, calculated 111.8 (H) 08/10/2021 09:08 AM    VLDL, calculated 19 02/21/2011 11:15 AM    Triglyceride 61 08/10/2021 09:08 AM       Lab Results   Component Value Date/Time    Hemoglobin A1c 5.5 01/16/2019 10:15 AM    Hemoglobin A1c (POC) 5.8 12/24/2015 11:42 AM       Assessment/Plan:   1.  Primary hypertension: BP is mildly elevated. - Refilling her previous meds  - RTC for a BP check  - Checking labs. ORDERS:  - MICROALBUMIN, UR, RAND W/ MICROALB/CREAT RATIO; Future  - LIPID PANEL; Future  - HEMOGLOBIN A1C WITH EAG; Future  - METABOLIC PANEL, COMPREHENSIVE; Future  - HEMOGLOBIN A1C WITH EAG; Future  - lisinopriL (PRINIVIL, ZESTRIL) 20 mg tablet; TAKE ONE TABLET BY MOUTH ONCE DAILY  Dispense: 90 Tablet; Refill: 1  - NIFEdipine ER (PROCARDIA XL) 90 mg ER tablet; TAKE ONE TABLET BY MOUTH ONCE DAILY  Dispense: 90 Tablet; Refill: 1    2. Pre-diabetes Hx: +Obesity. BMI is 53. +Cannabis  - Checking labs. - I encouraged her to reduce her weight by lowering her carb/caloric intake. - We discussed the option of pharmacotherapy options. She will let me know if she is interested in this option.  - I encouraged her not to smoke marijuana as this can cause increased appetite and weight gain. ORDERS:  - HEMOGLOBIN A1C WITH EAG; Future  - METABOLIC PANEL, COMPREHENSIVE; Future  - HEMOGLOBIN A1C WITH EAG; Future    3. Health Maintenance:   - I encouraged her to schedule a check up with her gynecologist given her h/o an IUD          Health Maintenance Due   Topic Date Due    Hepatitis C Screening  Never done    A1C test (Diabetic or Prediabetic)  01/16/2020    Cervical cancer screen  02/13/2021    Depression Screen  10/29/2021    COVID-19 Vaccine (3 - Booster for [place] series) 04/23/2022     Lab review: labs are reviewed in the EHR and ordered as mentioned above    I have discussed the diagnosis with the patient and the intended plan as seen in the above orders. The patient has received an after-visit summary and questions were answered concerning future plans. I have discussed medication side effects and warnings with the patient as well. I have reviewed the plan of care with the patient, accepted their input and they are in agreement with the treatment goals. All questions were answered.  The patient understands the plan of care. Handouts provided today with above information. Pt instructed if symptoms worsen to call the office or report to the ED for continued care. Greater than 50% of the visit time was spent in counseling and/or coordination of care. Voice recognition was used to generate this report, which may have resulted in some phonetic based errors in grammar and contents. Even though attempts were made to correct all the mistakes, some may have been missed, and remained in the body of the document. Follow-up and Dispositions    · Return in about 6 months (around 12/7/2022).          Marialuisa Ceja MD

## 2022-06-07 NOTE — PATIENT INSTRUCTIONS
Body Mass Index: Care Instructions  Your Care Instructions     Body mass index (BMI) can help you see if your weight is raising your risk for health problems. It uses a formula to compare how much you weigh with how tall you are. · A BMI lower than 18.5 is considered underweight. · A BMI between 18.5 and 24.9 is considered healthy. · A BMI between 25 and 29.9 is considered overweight. A BMI of 30 or higher is considered obese. If your BMI is in the normal range, it means that you have a lower risk for weight-related health problems. If your BMI is in the overweight or obese range, you may be at increased risk for weight-related health problems, such as high blood pressure, heart disease, stroke, arthritis or joint pain, and diabetes. If your BMI is in the underweight range, you may be at increased risk for health problems such as fatigue, lower protection (immunity) against illness, muscle loss, bone loss, hair loss, and hormone problems. BMI is just one measure of your risk for weight-related health problems. You may be at higher risk for health problems if you are not active, you eat an unhealthy diet, or you drink too much alcohol or use tobacco products. Follow-up care is a key part of your treatment and safety. Be sure to make and go to all appointments, and call your doctor if you are having problems. It's also a good idea to know your test results and keep a list of the medicines you take. How can you care for yourself at home? · Practice healthy eating habits. This includes eating plenty of fruits, vegetables, whole grains, lean protein, and low-fat dairy. · If your doctor recommends it, get more exercise. Walking is a good choice. Bit by bit, increase the amount you walk every day. Try for at least 30 minutes on most days of the week. · Do not smoke. Smoking can increase your risk for health problems. If you need help quitting, talk to your doctor about stop-smoking programs and medicines. These can increase your chances of quitting for good. · Limit alcohol to 2 drinks a day for men and 1 drink a day for women. Too much alcohol can cause health problems. If you have a BMI higher than 25  · Your doctor may do other tests to check your risk for weight-related health problems. This may include measuring the distance around your waist. A waist measurement of more than 40 inches in men or 35 inches in women can increase the risk of weight-related health problems. · Talk with your doctor about steps you can take to stay healthy or improve your health. You may need to make lifestyle changes to lose weight and stay healthy, such as changing your diet and getting regular exercise. If you have a BMI lower than 18.5  · Your doctor may do other tests to check your risk for health problems. · Talk with your doctor about steps you can take to stay healthy or improve your health. You may need to make lifestyle changes to gain or maintain weight and stay healthy, such as getting more healthy foods in your diet and doing exercises to build muscle. Where can you learn more? Go to http://www.corona.com/  Enter S176 in the search box to learn more about \"Body Mass Index: Care Instructions. \"  Current as of: December 27, 2021               Content Version: 13.2  © 2006-2022 [employer]. Care instructions adapted under license by [employer] (which disclaims liability or warranty for this information). If you have questions about a medical condition or this instruction, always ask your healthcare professional. [place] [address] any warranty or liability for your use of this information.

## 2022-06-08 LAB
ALBUMIN SERPL-MCNC: 4 G/DL (ref 3.8–4.8)
ALBUMIN/CREAT UR: 59 MG/G CREAT (ref 0–29)
ALBUMIN/GLOB SERPL: 1.4 {RATIO} (ref 1.2–2.2)
ALP SERPL-CCNC: 89 IU/L (ref 44–121)
ALT SERPL-CCNC: 34 IU/L (ref 0–32)
AST SERPL-CCNC: 18 IU/L (ref 0–40)
BILIRUB SERPL-MCNC: 0.3 MG/DL (ref 0–1.2)
BUN SERPL-MCNC: 19 MG/DL (ref 6–20)
BUN/CREAT SERPL: 26 (ref 9–23)
CALCIUM SERPL-MCNC: 8.8 MG/DL (ref 8.7–10.2)
CHLORIDE SERPL-SCNC: 102 MMOL/L (ref 96–106)
CHOLEST SERPL-MCNC: 169 MG/DL (ref 100–199)
CO2 SERPL-SCNC: 22 MMOL/L (ref 20–29)
CREAT SERPL-MCNC: 0.73 MG/DL (ref 0.57–1)
CREAT UR-MCNC: 104.7 MG/DL
EGFR: 108 ML/MIN/1.73
EST. AVERAGE GLUCOSE BLD GHB EST-MCNC: 120 MG/DL
GLOBULIN SER CALC-MCNC: 2.8 G/DL (ref 1.5–4.5)
GLUCOSE SERPL-MCNC: 102 MG/DL (ref 65–99)
HBA1C MFR BLD: 5.8 % (ref 4.8–5.6)
HDLC SERPL-MCNC: 40 MG/DL
IMP & REVIEW OF LAB RESULTS: NORMAL
LDLC SERPL CALC-MCNC: 102 MG/DL (ref 0–99)
MICROALBUMIN UR-MCNC: 61.3 UG/ML
POTASSIUM SERPL-SCNC: 4.5 MMOL/L (ref 3.5–5.2)
PROT SERPL-MCNC: 6.8 G/DL (ref 6–8.5)
SODIUM SERPL-SCNC: 136 MMOL/L (ref 134–144)
TRIGL SERPL-MCNC: 155 MG/DL (ref 0–149)
VLDLC SERPL CALC-MCNC: 27 MG/DL (ref 5–40)

## 2022-06-12 NOTE — PROGRESS NOTES
Please let her know that her CMP is not show any significant abnormalities other than an elevated ALT of 34. Her total cholesterol was 169. Triglycerides are 155. HDL was 40. LDL was 102. Her A1c is 5.8. Please have her schedule to discuss these results more in depth.      Dr. Nettie Dyer  [facility], [address] [place] [address], [address] [place] [address] Str.  Phone: [phone]  Fax: [phone]

## 2022-06-13 NOTE — PROGRESS NOTES
I called the patient and advised. Patient verbalized understanding, and has scheduled a follow up appt. For tomorrow with Dr. Vita Allen to discuss her results.

## 2022-06-14 ENCOUNTER — OFFICE VISIT (OUTPATIENT)
Dept: INTERNAL MEDICINE CLINIC | Age: 37
End: 2022-06-14
Payer: COMMERCIAL

## 2022-06-14 ENCOUNTER — APPOINTMENT (OUTPATIENT)
Dept: INTERNAL MEDICINE CLINIC | Age: 37
End: 2022-06-14

## 2022-06-14 VITALS
OXYGEN SATURATION: 99 % | DIASTOLIC BLOOD PRESSURE: 80 MMHG | SYSTOLIC BLOOD PRESSURE: 136 MMHG | HEART RATE: 78 BPM | WEIGHT: 293 LBS | HEIGHT: 64 IN | BODY MASS INDEX: 50.02 KG/M2 | RESPIRATION RATE: 14 BRPM | TEMPERATURE: 98.1 F

## 2022-06-14 DIAGNOSIS — I10 PRIMARY HYPERTENSION: ICD-10-CM

## 2022-06-14 DIAGNOSIS — R73.03 PRE-DIABETES: ICD-10-CM

## 2022-06-14 DIAGNOSIS — R74.01 TRANSAMINITIS: ICD-10-CM

## 2022-06-14 DIAGNOSIS — Z11.1 SCREENING-PULMONARY TB: ICD-10-CM

## 2022-06-14 DIAGNOSIS — R74.01 TRANSAMINITIS: Primary | ICD-10-CM

## 2022-06-14 PROCEDURE — 99214 OFFICE O/P EST MOD 30 MIN: CPT | Performed by: INTERNAL MEDICINE

## 2022-06-14 NOTE — PATIENT INSTRUCTIONS

## 2022-06-14 NOTE — PROGRESS NOTES
[address] [facility] presents today for   Chief Complaint   Patient presents with    Follow-up    Labs     6-7-22    New Order     Needs quantiferon TB test done for school/work        1. \"Have you been to the ER, urgent care clinic since your last visit? Hospitalized since your last visit? \" no    2. \"Have you seen or consulted any other health care providers outside of the [address] [place] [address] since your last visit? \" yes     3. For patients aged 39-70: Has the patient had a colonoscopy / FIT/ Cologuard? NA - based on age      If the patient is female:    4. For patients aged 41-77: Has the patient had a mammogram within the past 2 years? NA - based on age or sex  See top three    5. For patients aged 21-65: Has the patient had a pap smear?  No

## 2022-06-14 NOTE — PROGRESS NOTES
INTERNISTS OF [facility]:  6/14/2022, MRN: [id number]      Rivera Wesley is a 44 y.o. female and presents to clinic for Follow-up, Labs (6-7-22), and New Order (Needs quantiferon TB test done for school/work)      Subjective: The patient is a 39-year-old female with history of obesity, prediabetes, and hypertension.     1. Transaminitis: Mlid per recent labs. She denies drinking ETOH the night before her labs were drawn but she had 8 drinks a couple of days before her labs were drawn. No h/o liver disease. 2. Prediabetes: Recent labs show that her A1C is 5.8. She has not been dieting. Weight is 314lbs. BMI is 53. Patient Active Problem List    Diagnosis Date Noted    Morbid obesity due to excess calories ([address] 08/27/2017    Pre-diabetes 12/24/2015    HTN (hypertension) 01/27/2011       Current Outpatient Medications   Medication Sig Dispense Refill    lisinopriL (PRINIVIL, ZESTRIL) 20 mg tablet TAKE ONE TABLET BY MOUTH ONCE DAILY 90 Tablet 1    NIFEdipine ER (PROCARDIA XL) 90 mg ER tablet TAKE ONE TABLET BY MOUTH ONCE DAILY 90 Tablet 1    levonorgestrel (MIRENA) 20 mcg/24 hr IUD 1 Each by IntraUTERine route once. No Known Allergies    Past Medical History:   Diagnosis Date    HTN (hypertension) 1/27/2011    Morbid obesity due to excess calories ([address] 8/27/2017    Pap smear 2012    total care for women.         Past Surgical History:   Procedure Laterality Date    HX HEENT  2 months old    tear duct stenosis    HX UROLOGICAL      one kidney a little small, but OK with maturation       Family History   Problem Relation Age of Onset    Hypertension Mother     Elevated Lipids Mother     Hypertension Maternal Grandmother     Elevated Lipids Maternal Grandmother     Stroke Maternal Grandmother     Hypertension Maternal Grandfather     Elevated Lipids Maternal Grandfather     Heart Disease Maternal Grandfather     Hypertension Paternal Grandmother     Elevated Lipids Paternal Grandmother     Hypertension Paternal Grandfather     Elevated Lipids Paternal Grandfather     Cancer Maternal Aunt         leukemia    Hypertension Paternal Aunt        Social History     Tobacco Use    Smoking status: Never Smoker    Smokeless tobacco: Never Used   Substance Use Topics    Alcohol use: Yes     Alcohol/week: 3.0 standard drinks     Types: 3 Standard drinks or equivalent per week     Comment: occasionally, socially       ROS   Review of Systems   Constitutional: Negative for chills and fever. HENT: Negative for ear pain and sore throat. Eyes: Negative for blurred vision and pain. Respiratory: Negative for cough and shortness of breath. Cardiovascular: Negative for chest pain. Gastrointestinal: Negative for abdominal pain, blood in stool and melena. Genitourinary: Negative for dysuria and hematuria. Musculoskeletal: Negative for joint pain and myalgias. Skin: Negative for rash. Neurological: Negative for headaches. Endo/Heme/Allergies: Does not bruise/bleed easily. Psychiatric/Behavioral: Negative for substance abuse. Objective     Vitals:    06/14/22 1402   BP: 136/80   Pulse: 78   Resp: 14   Temp: 98.1 °F (36.7 °C)   TempSrc: Temporal   SpO2: 99%   Weight: 314 lb (142.4 kg)   Height: 5' 4\" (1.626 m)   PainSc:   0 - No pain       Physical Exam  Vitals and nursing note reviewed. HENT:      Head: Normocephalic and atraumatic. Right Ear: External ear normal.      Left Ear: External ear normal.   Eyes:      General: No scleral icterus. Right eye: No discharge. Left eye: No discharge. Conjunctiva/sclera: Conjunctivae normal.   Cardiovascular:      Rate and Rhythm: Normal rate and regular rhythm. Heart sounds: Normal heart sounds. No murmur heard. No friction rub. No gallop. Pulmonary:      Effort: Pulmonary effort is normal. No respiratory distress. Breath sounds: Normal breath sounds. No wheezing or rales.    Chest:      Chest wall: No tenderness. Abdominal:      General: Bowel sounds are normal. There is no distension. Palpations: Abdomen is soft. There is no mass. Tenderness: There is no abdominal tenderness. There is no guarding or rebound. Musculoskeletal:         General: No swelling (BUE) or tenderness (BUE). Cervical back: Neck supple. Lymphadenopathy:      Cervical: No cervical adenopathy. Skin:     General: Skin is warm and dry. Findings: No erythema or rash. Neurological:      Mental Status: She is alert. Motor: No abnormal muscle tone. Gait: Gait normal.   Psychiatric:         Mood and Affect: Mood normal.         LABS   Data Review:   No results found for: WBC, WBCLT, HGBPOC, HGB, HGBP, HCTPOC, HCT, PHCT, RBCH, PLT, MCV, HGBEXT, HCTEXT, PLTEXT    Lab Results   Component Value Date/Time    Sodium 136 06/07/2022 09:32 AM    Potassium 4.5 06/07/2022 09:32 AM    Chloride 102 06/07/2022 09:32 AM    CO2 22 06/07/2022 09:32 AM    Anion gap 13.0 08/04/2017 11:08 PM    Glucose 102 (H) 06/07/2022 09:32 AM    BUN 19 06/07/2022 09:32 AM    Creatinine 0.73 06/07/2022 09:32 AM    BUN/Creatinine ratio 26 (H) 06/07/2022 09:32 AM    GFR est  01/16/2019 10:15 AM    GFR est non- 01/16/2019 10:15 AM    Calcium 8.8 06/07/2022 09:32 AM       Lab Results   Component Value Date/Time    Cholesterol, total 169 06/07/2022 09:32 AM    HDL Cholesterol 40 06/07/2022 09:32 AM    LDL, calculated 102 (H) 06/07/2022 09:32 AM    LDL, calculated 111.8 (H) 08/10/2021 09:08 AM    VLDL, calculated 27 06/07/2022 09:32 AM    VLDL, calculated 19 02/21/2011 11:15 AM    Triglyceride 155 (H) 06/07/2022 09:32 AM       Lab Results   Component Value Date/Time    Hemoglobin A1c 5.8 (H) 06/07/2022 09:32 AM    Hemoglobin A1c (POC) 5.8 12/24/2015 11:42 AM       Assessment/Plan:   1. Screening-pulmonary TB  - Quantiferon TB test ordered    ORDERS:  - QUANTIFERON-TB GOLD PLUS; Future    2.  Prediabetes: Her A1C is 5.8.   - Low carb diet recommended. - We discussed pharmacotherapy options. She will let me know if she is interested in a GLP-1 agonist. She declines today for rx for prediabetes and to assist with weight reduction. 3. Transaminitis: Etiology is unknown.  - RUQ ultrasound ordered to r/o NAFLD  - Checking f/u labs to r/o hepatitis   - I encouraged her to stop consuming ETOH beverages. Health Maintenance Due   Topic Date Due    Hepatitis C Screening  Never done    Cervical cancer screen  02/13/2021    COVID-19 Vaccine (3 - Booster for Pfizer series) 04/23/2022         Lab review: labs are reviewed in the EHR and ordered as mentioned above    I have discussed the diagnosis with the patient and the intended plan as seen in the above orders. The patient has received an after-visit summary and questions were answered concerning future plans. I have discussed medication side effects and warnings with the patient as well. I have reviewed the plan of care with the patient, accepted their input and they are in agreement with the treatment goals. All questions were answered. The patient understands the plan of care. Handouts provided today with above information. Pt instructed if symptoms worsen to call the office or report to the ED for continued care. Greater than 50% of the visit time was spent in counseling and/or coordination of care. Voice recognition was used to generate this report, which may have resulted in some phonetic based errors in grammar and contents. Even though attempts were made to correct all the mistakes, some may have been missed, and remained in the body of the document.           Saintclair Ping, MD

## 2022-06-15 LAB
CRP SERPL-MCNC: 15 MG/L (ref 0–10)
FERRITIN SERPL-MCNC: 119 NG/ML (ref 15–150)
HBV SURFACE AG SERPL QL IA: NEGATIVE
HCV AB S/CO SERPL IA: <0.1 S/CO RATIO (ref 0–0.9)

## 2022-06-18 LAB
GAMMA INTERFERON BACKGROUND BLD IA-ACNC: 0.09 IU/ML
M TB IFN-G BLD-IMP: POSITIVE
M TB IFN-G CD4+ BCKGRND COR BLD-ACNC: 0.75 IU/ML
MITOGEN IGNF BLD-ACNC: >10 IU/ML
QUANTIFERON INCUBATION, QF1T: ABNORMAL
QUANTIFERON TB2 AG: 0.14 IU/ML
SERVICE CMNT-IMP: ABNORMAL

## 2022-06-22 DIAGNOSIS — R79.82 ELEVATED C-REACTIVE PROTEIN (CRP): ICD-10-CM

## 2022-06-22 DIAGNOSIS — R76.12 POSITIVE QUANTIFERON-TB GOLD TEST: Primary | ICD-10-CM

## 2022-06-22 DIAGNOSIS — R76.12 POSITIVE QUANTIFERON-TB GOLD TEST: ICD-10-CM

## 2022-06-22 DIAGNOSIS — R74.01 TRANSAMINITIS: ICD-10-CM

## 2022-06-22 NOTE — PROGRESS NOTES
Please let the patient know that her TB test is positive. She will need a chest x-ray to determine if she has active or latent tuberculosis. Her CRP inflammatory protein is also elevated. I am ordering a follow up lab test to investigate this finding.     Dr. David Sheikh  [facility]ists of [address]  Phone: [phone]  Fax: [phone]

## 2022-06-23 ENCOUNTER — HOSPITAL ENCOUNTER (OUTPATIENT)
Dept: LAB | Age: 37
Discharge: HOME | End: 2022-06-23
Payer: COMMERCIAL

## 2022-06-23 ENCOUNTER — PATIENT MESSAGE (OUTPATIENT)
Dept: INTERNAL MEDICINE CLINIC | Age: 37
End: 2022-06-23

## 2022-06-23 ENCOUNTER — HOSPITAL ENCOUNTER (OUTPATIENT)
Dept: GENERAL RADIOLOGY | Age: 37
Discharge: HOME | End: 2022-06-23
Payer: COMMERCIAL

## 2022-06-23 ENCOUNTER — TELEPHONE (OUTPATIENT)
Dept: INTERNAL MEDICINE CLINIC | Age: 37
End: 2022-06-23

## 2022-06-23 DIAGNOSIS — R74.01 TRANSAMINITIS: ICD-10-CM

## 2022-06-23 DIAGNOSIS — R79.82 ELEVATED C-REACTIVE PROTEIN (CRP): ICD-10-CM

## 2022-06-23 LAB
CRP SERPL-MCNC: 1.1 MG/DL (ref 0–0.3)
FERRITIN SERPL-MCNC: 88 NG/ML (ref 8–388)

## 2022-06-23 PROCEDURE — 86803 HEPATITIS C AB TEST: CPT

## 2022-06-23 PROCEDURE — 36415 COLL VENOUS BLD VENIPUNCTURE: CPT

## 2022-06-23 PROCEDURE — 86140 C-REACTIVE PROTEIN: CPT

## 2022-06-23 PROCEDURE — 86225 DNA ANTIBODY NATIVE: CPT

## 2022-06-23 PROCEDURE — 86015 ACTIN ANTIBODY EACH: CPT

## 2022-06-23 PROCEDURE — 87340 HEPATITIS B SURFACE AG IA: CPT

## 2022-06-23 PROCEDURE — 71046 X-RAY EXAM CHEST 2 VIEWS: CPT

## 2022-06-23 PROCEDURE — 82728 ASSAY OF FERRITIN: CPT

## 2022-06-23 NOTE — TELEPHONE ENCOUNTER
----- Message from Casandra Valentin MD sent at 6/22/2022  3:40 PM EDT -----  Please let the patient know that her TB test is positive. She will need a chest x-ray to determine if she has active or latent tuberculosis. Her CRP inflammatory protein is also elevated. I am ordering a follow up lab test to investigate this finding.     Dr. Karen Calloway  Internists of [address], [address] [place] [address] Str.  Phone: [phone]  Fax: [phone]

## 2022-06-23 NOTE — TELEPHONE ENCOUNTER
----- Message from Jessica Soni MD sent at 6/22/2022  3:40 PM EDT -----  Please let the patient know that her TB test is positive. She will need a chest x-ray to determine if she has active or latent tuberculosis. Her CRP inflammatory protein is also elevated. I am ordering a follow up lab test to investigate this finding.     Dr. Mao Salazar  Internists of [address]  Phone: [phone]  Fax: [phone]

## 2022-06-24 ENCOUNTER — TELEPHONE (OUTPATIENT)
Dept: INTERNAL MEDICINE CLINIC | Age: 37
End: 2022-06-24

## 2022-06-24 LAB
HBV SURFACE AG SER QL: <0.1 INDEX
HBV SURFACE AG SER QL: NEGATIVE
HCV AB SER IA-ACNC: 0.06 INDEX
HCV AB SERPL QL IA: NEGATIVE
HCV COMMENT,HCGAC: NORMAL

## 2022-06-24 NOTE — TELEPHONE ENCOUNTER
Patient reached per DR. Amita Herrera and scheduled for a VV next week to discuss treatment options.

## 2022-06-24 NOTE — TELEPHONE ENCOUNTER
Patient called and states she had a positive TB test and was instructed to get a chest x-ray. She completed that yesterday and is wanted to speak with Dr. Gianni Engel or a nurse about what she is supposed to do. 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 She is requesting a return call and can be reached at [phone].   Please advise, thank you

## 2022-06-25 LAB
CENTROMERE B AB SER-ACNC: <0.2 AI (ref 0–0.9)
CHROMATIN AB SERPL-ACNC: <0.2 AI (ref 0–0.9)
DSDNA AB SER-ACNC: 1 IU/ML (ref 0–9)
ENA JO1 AB SER-ACNC: <0.2 AI (ref 0–0.9)
ENA RNP AB SER-ACNC: <0.2 AI (ref 0–0.9)
ENA SCL70 AB SER-ACNC: <0.2 AI (ref 0–0.9)
ENA SM AB SER-ACNC: <0.2 AI (ref 0–0.9)
ENA SS-A AB SER-ACNC: <0.2 AI (ref 0–0.9)
ENA SS-B AB SER-ACNC: <0.2 AI (ref 0–0.9)
SEE BELOW, 164869: NORMAL
SMA IGG SER-ACNC: 5 UNITS (ref 0–19)

## 2022-06-28 ENCOUNTER — VIRTUAL VISIT (OUTPATIENT)
Dept: INTERNAL MEDICINE CLINIC | Age: 37
End: 2022-06-28
Payer: COMMERCIAL

## 2022-06-28 DIAGNOSIS — Z22.7 TB LUNG, LATENT: Primary | ICD-10-CM

## 2022-06-28 PROCEDURE — 99213 OFFICE O/P EST LOW 20 MIN: CPT | Performed by: INTERNAL MEDICINE

## 2022-06-28 RX ORDER — RIFAMPIN 300 MG/1
300 CAPSULE ORAL
Qty: 90 CAPSULE | Refills: 0 | Status: SHIPPED | OUTPATIENT
Start: 2022-06-28 | End: 2022-09-26

## 2022-06-28 RX ORDER — ISONIAZID 300 MG/1
300 TABLET ORAL DAILY
Qty: 90 TABLET | Refills: 0 | Status: SHIPPED | OUTPATIENT
Start: 2022-06-28 | End: 2022-09-26

## 2022-06-28 NOTE — PROGRESS NOTES
Lisa Camarena is a 44 y.o. female who was seen by synchronous (real-time) audio-video technology on 6/28/2022. Assessment & Plan:   Latent TB:  - The [facility] was contacted as this is a mandatory reportable infection.  - Ordering INF and rifampin x 3 months  - I encouraged her to have all in home contacts tested. - She was told that she should never have this test again as she will always be positive for the TB gold quantiferon test.         Lab review: labs are reviewed in the EHR     I have discussed the diagnosis with the patient and the intended plan as seen in the above orders. I have discussed medication side effects and warnings with the patient as well. I have reviewed the plan of care with the patient, accepted their input and they are in agreement with the treatment goals. All questions were answered. The patient understands the plan of care. Pt instructed if symptoms worsen to call the office or report to the ED for continued care. Greater than 50% of the visit time was spent in counseling and/or coordination of care. Voice recognition was used to generate this report, which may have resulted in some phonetic based errors in grammar and contents. Even though attempts were made to correct all the mistakes, some may have been missed, and remained in the body of the document. Subjective: Lisa Camarena was seen for   Chief Complaint   Patient presents with    Other     Latent TB     The patient is a 21-year-old female with history of obesity, prediabetes, LTBI (on 3 months of rifampin and INH), and hypertension. Latent TB:  Per her recent positive quantiferon gold test. Her CXR was unremarkable. Asymptomatic. Prior to Admission medications    Medication Sig Start Date End Date Taking?  Authorizing Provider   lisinopriL (PRINIVIL, ZESTRIL) 20 mg tablet TAKE ONE TABLET BY MOUTH ONCE DAILY 6/7/22   Howie Solorzano MD   NIFEdipine ER (PROCARDIA XL) 90 mg ER tablet TAKE ONE TABLET BY MOUTH ONCE DAILY 6/7/22   Edwar Stone MD   levonorgestrel (MIRENA) 20 mcg/24 hr IUD 1 Each by IntraUTERine route once. Provider, Historical     No Known Allergies  Past Medical History:   Diagnosis Date    HTN (hypertension) 1/27/2011    Morbid obesity due to excess calories ([address] 8/27/2017    Pap smear 2012    total care for women. Past Surgical History:   Procedure Laterality Date    HX HEENT  2 months old    tear duct stenosis    HX UROLOGICAL      one kidney a little small, but OK with maturation     Family History   Problem Relation Age of Onset    Hypertension Mother    [employer] Elevated Lipids Mother     Hypertension Maternal Grandmother     Elevated Lipids Maternal Grandmother     Stroke Maternal Grandmother     Hypertension Maternal Grandfather     Elevated Lipids Maternal Grandfather     Heart Disease Maternal Grandfather     Hypertension Paternal Grandmother     Elevated Lipids Paternal Grandmother     Hypertension Paternal Grandfather     Elevated Lipids Paternal Grandfather     Cancer Maternal Aunt         leukemia    Hypertension Paternal Aunt      Social History     Socioeconomic History    Marital status: SINGLE   Occupational History    Occupation: psr     Employer: [employer]   Tobacco Use    Smoking status: Never Smoker    Smokeless tobacco: Never Used   Substance and Sexual Activity    Alcohol use: Yes     Alcohol/week: 3.0 standard drinks     Types: 3 Standard drinks or equivalent per week     Comment: occasionally, socially    Drug use: Yes     Types: Marijuana    Sexual activity: Yes     Partners: Male     Birth control/protection: I.U.D. ROS:  Gen: No fever/chills  HEENT: No sore throat, eye pain, ear pain, or congestion.  No HA  CV: No CP  Resp: No cough/SOB  GI: No abdominal pain  : No hematuria/dysuria  Derm: No rash  Neuro: No new paresthesias/weakness  Musc: No new myalgias/jt pain  Psych: No depression sx      Objective:     General: alert, cooperative, no distress   Mental  status: mental status: alert, oriented to person, place, and time, normal mood, behavior, speech, dress, motor activity, and thought processes   Resp: resp: normal effort and no respiratory distress   Neuro: neuro: no gross deficits   Skin: skin: no discoloration or lesions of concern on visible areas     LABS:  Lab Results   Component Value Date/Time    Sodium 136 06/07/2022 09:32 AM    Potassium 4.5 06/07/2022 09:32 AM    Chloride 102 06/07/2022 09:32 AM    CO2 22 06/07/2022 09:32 AM    Anion gap 13.0 08/04/2017 11:08 PM    Glucose 102 (H) 06/07/2022 09:32 AM    BUN 19 06/07/2022 09:32 AM    Creatinine 0.73 06/07/2022 09:32 AM    BUN/Creatinine ratio 26 (H) 06/07/2022 09:32 AM    GFR est  01/16/2019 10:15 AM    GFR est non- 01/16/2019 10:15 AM    Calcium 8.8 06/07/2022 09:32 AM       Lab Results   Component Value Date/Time    Cholesterol, total 169 06/07/2022 09:32 AM    HDL Cholesterol 40 06/07/2022 09:32 AM    LDL, calculated 102 (H) 06/07/2022 09:32 AM    LDL, calculated 111.8 (H) 08/10/2021 09:08 AM    VLDL, calculated 27 06/07/2022 09:32 AM    VLDL, calculated 19 02/21/2011 11:15 AM    Triglyceride 155 (H) 06/07/2022 09:32 AM       No results found for: WBC, WBCLT, HGBPOC, HGB, HGBP, HCTPOC, HCT, PHCT, RBCH, PLT, MCV, HGBEXT, HCTEXT, PLTEXT, HGBEXT, HCTEXT, PLTEXT    Lab Results   Component Value Date/Time    Hemoglobin A1c 5.8 (H) 06/07/2022 09:32 AM    Hemoglobin A1c (POC) 5.8 12/24/2015 11:42 AM       Lab Results   Component Value Date/Time    TSH 3.000 02/21/2011 11:15 AM           Due to this being a TeleHealth  evaluation, many elements of the physical examination are unable to be assessed. The pt was seen by synchronous (real-time) audio-video technology, and/or her healthcare decision maker, is aware that this patient-initiated, Telehealth encounter is a billable service, with coverage as determined by her insurance carrier.  She is aware that she may receive a bill and has provided verbal consent to proceed: Yes. The patient (or guardian if applicable) is aware that this is a billable service, which includes applicable copays. This virtual visit was conducted with the patient's (and/or legal guardian's) consent. The pt is located in a state where the provider was licensed to provide care. The pt is being evaluated by a video visit encounter for concerns as above. A caregiver was present when appropriate. Due to this being a TeleHealth encounter (During NNQT-12 public health emergency), evaluation of the following organ systems was limited: Vitals/Constitutional/EENT/Resp/CV/GI//MS/Neuro/Skin/Heme-Lymph-Imm. Pursuant to the emergency declaration under the [address] [place] [address], [facility] waiver authority and the [employer] Act, this Virtual  Visit was conducted, with patient's (and/or legal guardian's) consent, to reduce the patient's risk of exposure to COVID-19 and provide necessary medical care. Services were provided through a video synchronous discussion virtually to substitute for in-person clinic visit. Patient and provider were located at their individual home/office respectively. We discussed the expected course, resolution and complications of the diagnosis(es) in detail. Medication risks, benefits, costs, interactions, and alternatives were discussed as indicated. I advised her to contact the office if her condition worsens, changes or fails to improve as anticipated. She expressed understanding with the diagnosis(es) and plan. Graeme Almendarez MD    [address] [place] [address], who was evaluated through a synchronous (real-time) audio-video encounter, and/or her healthcare decision maker, is aware that it is a billable service, which includes applicable co-pays, with coverage as determined by her insurance carrier.  She provided verbal consent to proceed and patient identification was verified. This visit was conducted pursuant to the emergency declaration under the [address] [place] [address] and the [employer] Act. A caregiver was present when appropriate. Ability to conduct physical exam was limited. The patient was located at: Home: [facility]  The provider was located at: Facility ([facility] Department): [address] [place] [address] Street [id number]    --Turner Valle MD on 6/28/2022 at 8:25 AM        [address] [facility], was evaluated through a synchronous (real-time) audio-video encounter. The patient (or guardian if applicable) is aware that this is a billable service, which includes applicable co-pays. This Virtual Visit was conducted with patient's (and/or legal guardian's) consent. The visit was conducted pursuant to the emergency declaration under the [address] [place] [address] and the [employer] Act. Patient identification was verified, and a caregiver was present when appropriate. The patient was located at: Home: [facility]  The provider was located at:  Facility [facility] [facility] [address] [id number]

## 2022-07-05 ENCOUNTER — PATIENT MESSAGE (OUTPATIENT)
Dept: INTERNAL MEDICINE CLINIC | Age: 37
End: 2022-07-05

## 2022-07-05 NOTE — TELEPHONE ENCOUNTER
----- Message from Destinee Lon. [address] sent at 7/5/2022  3:35 PM EDT -----  Regarding: TB medications  Hi Dr. Jean Pierre Moran-  I called [facility]ia [facility]ong today regarding the medications you prescribed last Tuesday for treatment of TB. They stated that the Isoniazid was ready for  but they were waiting on \"approval from the doctor\" for the Rifampin 300 mg capsule. Not sure exactly what was needed, but they said they had tried to contact your office about it. Can you please reach out to Katia Chong at [phone]?     Thank you,  Vandana Vasquez

## 2022-07-07 ENCOUNTER — TELEPHONE (OUTPATIENT)
Dept: INTERNAL MEDICINE CLINIC | Age: 37
End: 2022-07-07

## 2022-07-07 NOTE — TELEPHONE ENCOUNTER
Pt returning call to nurse. Please call her back.  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

## 2022-07-07 NOTE — TELEPHONE ENCOUNTER
Linda Adams MD  [facility] 18 hours ago (4:03 PM)     JM    Please let her know that rifampin interacts with her Procardia and makes the Procardia less effective.  It is safe to take both of these medications together but her blood pressure may not be adequately controlled with the Procardia.  Please have her check her blood pressure twice a day after beginning rifampin and isoniazid treatment for latent tuberculosis for the next 2 weeks.  She needs to let us know if none of her readings are below 140/90.  If so, I will need to increase this rx. Additionally, if she does not have access to a blood pressure machine, please have her scheduled for a follow-up appointment with me in the office in 3 to 4 weeks. Thanks,   Dr. Aldo Keller   Internists of [address]   Phone: [phone]   Fax: [phone]    Message text      Kayla Ibarra LPN routed conversation to Linda Adams MD; [facility] 23 hours ago (10:45 AM)     Kayla Ibarra LPN 23 hours ago (00:29 AM)     NC       Pharmacist states there is a drug to drug interaction with Rifampin and Procardia. Pharmacist wants to know if ok do dispense. Documentation      Kayla Ibarra LPN 23 hours ago (31:90 AM)     NC       ----- Message from [employer] sent at 7/5/2022  3:35 PM EDT -----  Regarding: TB medications  Hi Dr. Magdalena Mendosa-  I called [facility] today regarding the medications you prescribed last Tuesday for treatment of TB. They stated that the Isoniazid was ready for  but they were waiting on \"approval from the doctor\" for the Rifampin 300 mg capsule. Not sure exactly what was needed, but they said they had tried to contact your office about it.     Can you please reach out to Aguilar Montesinos at [phone]?     Thank you,  Abby Lin            Documentation      ANTON Santamaria April R 2 weeks ago     NC      ----- Message from Chun Swanson MD sent at 6/22/2022  3:40 PM EDT -----     Please let the patient know that her TB test is positive.  She will need a chest x-ray to determine if she has active or latent tuberculosis.  Her CRP inflammatory protein is also elevated. I am ordering a follow up lab test to investigate this finding.     You can go to any [facility] facility to have your chest x-ray done. Please let me know day you are available for labs so we can put you on the schedule.        Thank you,  Jerod Lemus.  DAVIDN

## 2022-07-07 NOTE — TELEPHONE ENCOUNTER
M for patient to return our call about message below. Wolfgang Cunningham MD  [facility] 18 hours ago (4:03 PM)      JM     Please let her know that rifampin interacts with her Procardia and makes the Procardia less effective.  It is safe to take both of these medications together but her blood pressure may not be adequately controlled with the Procardia.  Please have her check her blood pressure twice a day after beginning rifampin and isoniazid treatment for latent tuberculosis for the next 2 weeks.  She needs to let us know if none of her readings are below 140/90.  If so, I will need to increase this rx. Additionally, if she does not have access to a blood pressure machine, please have her scheduled for a follow-up appointment with me in the office in 3 to 4 weeks.      Thanks,   Dr. Magalis Lei   Internists of [address]   Phone: [phone]   Fax: [phone]    Message text

## 2022-09-07 LAB
CHOLEST SERPL-MCNC: 182 MG/DL
GLUCOSE SERPL-MCNC: 110 MG/DL (ref 74–99)
HDLC SERPL-MCNC: 35 MG/DL (ref 40–60)
LDLC SERPL CALC-MCNC: 116.8 MG/DL (ref 0–100)
TRIGL SERPL-MCNC: 151 MG/DL (ref ?–150)

## 2022-10-11 ENCOUNTER — PATIENT MESSAGE (OUTPATIENT)
Dept: INTERNAL MEDICINE CLINIC | Age: 37
End: 2022-10-11

## 2022-10-11 NOTE — TELEPHONE ENCOUNTER
----- Message from Petrona Coello. [employer] [employer] sent at 10/11/2022 11:49 AM EDT -----  Regarding: TB Medications   I have a question regarding the prescriptions I have been taking for recent TB diagnosis. I was told that the regimen was for 3 months for both medications. I have completed the 3 months as of today, however,  I noticed the Isoniazid bottle states zero (0) refills, but the Rifampin states there are three (3) refills left. Am I supposed to take the Rifampin for a total of 6 months?

## 2022-10-18 ENCOUNTER — TELEPHONE (OUTPATIENT)
Dept: INTERNAL MEDICINE CLINIC | Age: 37
End: 2022-10-18

## 2022-10-18 NOTE — LETTER
10/18/2022 1:04 PM    Ms. Godfrey Hobson [address] [address]      To Whom It May Concern:    Mrs. Diego Tellez is my patient and is under my medical care. On 6/14/22, she had a positive Quantiferon test. A follow up CXR was obtained and negative for active TB infection. She was thus started on isoniazid 300mg daily and rifampin 300mg daily x 3 months for latent TB. She completed treatment on 78/76/65 without complications. Contains abnormal data QUANTIFERON-TB GOLD PLUS  Order: [id number]   Collected 6/14/2022 02:28     Status: Final result       Component Ref Range & Units 6/14/22 0228    QuantiFERON Incubation  Incubation performed. QuantiFERON Criteria  Comment    Comment: The QuantiFERON-TB Gold Plus result is determined by subtracting   the Nil value from either TB antigen (Ag) tube. The mitogen tube   serves as a control for the test.    QuantiFERON TB1 Ag IU/mL 0.75    QuantiFERON TB2 Ag IU/mL 0.14    QuantiFERON Nil Value IU/mL 0.09    QuantiFERON Mitogen Value IU/mL >10.00    QuantiFERON Plus Negative Positive Abnormal          6/23/22 CXR: No acute diagnostic abnormality        Due to having latent TB via a quantiferon blood test, she should never have this test performed again as she will always remain positive despite completing treatment.       Sincerely,    Parker Yu MD

## 2022-10-18 NOTE — TELEPHONE ENCOUNTER
She completed 3 months of rx for latent TB. Letter generated. I will mail her a copy and send it via [address]     Dr. Rizwan Leone  [facility], [address] [place] [address], [address] [place] [address].  Phone: [phone]  Fax: [phone]

## 2022-12-13 DIAGNOSIS — I10 PRIMARY HYPERTENSION: ICD-10-CM

## 2022-12-13 DIAGNOSIS — E66.01 OBESITY, MORBID, BMI 50 OR HIGHER (HCC): ICD-10-CM

## 2022-12-14 RX ORDER — LISINOPRIL 20 MG/1
TABLET ORAL
Qty: 90 TABLET | Refills: 1 | Status: SHIPPED | OUTPATIENT
Start: 2022-12-14

## 2022-12-14 RX ORDER — NIFEDIPINE 90 MG/1
TABLET, EXTENDED RELEASE ORAL
Qty: 90 TABLET | Refills: 1 | Status: SHIPPED | OUTPATIENT
Start: 2022-12-14

## 2023-01-17 ENCOUNTER — APPOINTMENT (OUTPATIENT)
Dept: INTERNAL MEDICINE CLINIC | Age: 38
End: 2023-01-17

## 2023-01-17 DIAGNOSIS — I10 PRIMARY HYPERTENSION: Primary | ICD-10-CM

## 2023-01-17 DIAGNOSIS — R73.03 PRE-DIABETES: ICD-10-CM

## 2023-01-18 LAB
ALBUMIN SERPL-MCNC: 4.3 G/DL (ref 3.8–4.8)
ALBUMIN/GLOB SERPL: 1.6 {RATIO} (ref 1.2–2.2)
ALP SERPL-CCNC: 82 IU/L (ref 44–121)
ALT SERPL-CCNC: 32 IU/L (ref 0–32)
AST SERPL-CCNC: 22 IU/L (ref 0–40)
BILIRUB SERPL-MCNC: 0.3 MG/DL (ref 0–1.2)
BUN SERPL-MCNC: 17 MG/DL (ref 6–20)
BUN/CREAT SERPL: 20 (ref 9–23)
CALCIUM SERPL-MCNC: 9 MG/DL (ref 8.7–10.2)
CHLORIDE SERPL-SCNC: 102 MMOL/L (ref 96–106)
CHOLEST SERPL-MCNC: 184 MG/DL (ref 100–199)
CO2 SERPL-SCNC: 23 MMOL/L (ref 20–29)
CREAT SERPL-MCNC: 0.84 MG/DL (ref 0.57–1)
EGFR: 91 ML/MIN/1.73
EST. AVERAGE GLUCOSE BLD GHB EST-MCNC: 120 MG/DL
GLOBULIN SER CALC-MCNC: 2.7 G/DL (ref 1.5–4.5)
GLUCOSE SERPL-MCNC: 77 MG/DL (ref 70–99)
HBA1C MFR BLD: 5.8 % (ref 4.8–5.6)
HDLC SERPL-MCNC: 40 MG/DL
IMP & REVIEW OF LAB RESULTS: NORMAL
LDLC SERPL CALC-MCNC: 119 MG/DL (ref 0–99)
POTASSIUM SERPL-SCNC: 4.4 MMOL/L (ref 3.5–5.2)
PROT SERPL-MCNC: 7 G/DL (ref 6–8.5)
SODIUM SERPL-SCNC: 141 MMOL/L (ref 134–144)
T4 FREE SERPL-MCNC: 1.22 NG/DL (ref 0.82–1.77)
TRIGL SERPL-MCNC: 142 MG/DL (ref 0–149)
TSH SERPL DL<=0.005 MIU/L-ACNC: 3.04 UIU/ML (ref 0.45–4.5)
VLDLC SERPL CALC-MCNC: 25 MG/DL (ref 5–40)

## 2023-01-18 NOTE — PROGRESS NOTES
I will let her know at her upcoming appointment that her TFTs and CMP are unremarkable. Her total cholesterol is 184. Triglycerides are 1.2. HDL is 40. Her LDL is 119. These findings were 4 months ago. Her A1c is unchanged at 5.8.     Dr. Elizabeth Kiser  [facility], [address] [place] [address], [address] [place] [address] Str.  Phone: [phone]  Fax: [phone]

## 2023-01-23 NOTE — PROGRESS NOTES
Charo Portillo presents today for   Chief Complaint   Patient presents with    Follow-up    Labs     1-17-23       1. \"Have you been to the ER, urgent care clinic since your last visit? Hospitalized since your last visit? \" no    2. \"Have you seen or consulted any other health care providers outside of the [address] [place] [address] since your last visit? \" yes     3. For patients aged 39-70: Has the patient had a colonoscopy / FIT/ Cologuard? NA - based on age      If the patient is female:    4. For patients aged 41-77: Has the patient had a mammogram within the past 2 years? NA - based on age or sex  See top three    5. For patients aged 21-65: Has the patient had a pap smear? Yes - Care Gap present.  Most recent result on file

## 2023-01-24 ENCOUNTER — TELEPHONE (OUTPATIENT)
Dept: INTERNAL MEDICINE CLINIC | Age: 38
End: 2023-01-24

## 2023-01-24 ENCOUNTER — OFFICE VISIT (OUTPATIENT)
Dept: INTERNAL MEDICINE CLINIC | Age: 38
End: 2023-01-24
Payer: COMMERCIAL

## 2023-01-24 VITALS
HEART RATE: 77 BPM | SYSTOLIC BLOOD PRESSURE: 140 MMHG | TEMPERATURE: 98.7 F | DIASTOLIC BLOOD PRESSURE: 98 MMHG | OXYGEN SATURATION: 100 % | BODY MASS INDEX: 50.02 KG/M2 | HEIGHT: 64 IN | RESPIRATION RATE: 12 BRPM | WEIGHT: 293 LBS

## 2023-01-24 DIAGNOSIS — E66.01 OBESITY, MORBID, BMI 50 OR HIGHER (HCC): Primary | ICD-10-CM

## 2023-01-24 DIAGNOSIS — E78.5 HYPERLIPIDEMIA, UNSPECIFIED HYPERLIPIDEMIA TYPE: ICD-10-CM

## 2023-01-24 DIAGNOSIS — Z22.7 TB LUNG, LATENT: ICD-10-CM

## 2023-01-24 DIAGNOSIS — I10 PRIMARY HYPERTENSION: ICD-10-CM

## 2023-01-24 DIAGNOSIS — R73.03 PRE-DIABETES: ICD-10-CM

## 2023-01-24 PROCEDURE — 99214 OFFICE O/P EST MOD 30 MIN: CPT | Performed by: INTERNAL MEDICINE

## 2023-01-24 PROCEDURE — 3074F SYST BP LT 130 MM HG: CPT | Performed by: INTERNAL MEDICINE

## 2023-01-24 PROCEDURE — 3078F DIAST BP <80 MM HG: CPT | Performed by: INTERNAL MEDICINE

## 2023-01-24 NOTE — PROGRESS NOTES
[facility] OF [facility]:  1/24/2023, MRN: [id number]      Lisa Camarena is a 44 y.o. female and presents to clinic for Follow-up and Labs (1-17-23)      Subjective: The patient is a 78-year-old female with history of obesity, prediabetes, LTBI (on 3 months of rifampin and INH), and hypertension. 1. HTN: She just took her BP meds. BP is 140/98. On lisinopril and nifedipine. 2. 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 She bought a house - all of this w/I the past 6 months. \"For a little bit there, I thought I was going to lose my mind. \"    3. Latent TB: S/p recommended rx. Asymptomatic. 4. HLD/Prediabetes: Recent labs show that her A1C is 5.8. Her LDL is 119. She is not on any HLD rx. Her weight is down 7lbs. Her weight is 307lbs. Patient Active Problem List    Diagnosis Date Noted    Morbid obesity due to excess calories ([address] 08/27/2017    Pre-diabetes 12/24/2015    HTN (hypertension) 01/27/2011       Current Outpatient Medications   Medication Sig Dispense Refill    lisinopriL (PRINIVIL, ZESTRIL) 20 mg tablet TAKE ONE TABLET BY MOUTH ONCE DAILY 90 Tablet 1    NIFEdipine ER (PROCARDIA XL) 90 mg ER tablet TAKE ONE TABLET BY MOUTH ONCE DAILY 90 Tablet 1    levonorgestreL (MIRENA) 20 mcg/24 hours (8 yrs) 52 mg IUD 1 Each by IntraUTERine route once. No Known Allergies    Past Medical History:   Diagnosis Date    HTN (hypertension) 1/27/2011    Morbid obesity due to excess calories ([address] 8/27/2017    Pap smear 2012    total care for women.         Past Surgical History:   Procedure Laterality Date    HX HEENT  2 months old    tear duct stenosis    HX UROLOGICAL      one kidney a little small, but OK with maturation       Family History   Problem Relation Age of Onset    Hypertension Mother     Elevated Lipids Mother     Hypertension Maternal Grandmother     Elevated Lipids Maternal Grandmother     Stroke Maternal Grandmother Hypertension Maternal Grandfather     Elevated Lipids Maternal Grandfather     Heart Disease Maternal Grandfather     Hypertension Paternal Grandmother     Elevated Lipids Paternal Grandmother     Hypertension Paternal Grandfather     Elevated Lipids Paternal Grandfather     Cancer Maternal Aunt         leukemia    Hypertension Paternal Aunt        Social History     Tobacco Use    Smoking status: Never    Smokeless tobacco: Never   Substance Use Topics    Alcohol use:  Yes     Alcohol/week: 3.0 standard drinks     Types: 3 Standard drinks or equivalent per week     Comment: occasionally, socially       ROS   ROS    Objective     Vitals:    01/24/23 1152 01/24/23 1200   BP: (!) 141/102 (!) 140/98   Pulse: 77    Resp: 12    Temp: 98.7 °F (37.1 °C)    TempSrc: Temporal    SpO2: 100%    Weight: 307 lb (139.3 kg)    Height: 5' 4\" (1.626 m)        Physical Exam    LABS   Data Review:   No results found for: WBC, WBCLT, HGBPOC, HGB, HGBP, HCTPOC, HCT, PHCT, RBCH, PLT, MCV, HGBEXT, HCTEXT, PLTEXT    Lab Results   Component Value Date/Time    Sodium 141 01/17/2023 01:00 AM    Potassium 4.4 01/17/2023 01:00 AM    Chloride 102 01/17/2023 01:00 AM    CO2 23 01/17/2023 01:00 AM    Anion gap 13.0 08/04/2017 11:08 PM    Glucose 77 01/17/2023 01:00 AM    BUN 17 01/17/2023 01:00 AM    Creatinine 0.84 01/17/2023 01:00 AM    BUN/Creatinine ratio 20 01/17/2023 01:00 AM    GFR est  01/16/2019 10:15 AM    GFR est non- 01/16/2019 10:15 AM    Calcium 9.0 01/17/2023 01:00 AM       Lab Results   Component Value Date/Time    Cholesterol, total 184 01/17/2023 01:00 AM    HDL Cholesterol 40 01/17/2023 01:00 AM    LDL, calculated 119 (H) 01/17/2023 01:00 AM    LDL, calculated 116.8 (H) 09/07/2022 08:10 AM    VLDL, calculated 25 01/17/2023 01:00 AM    VLDL, calculated 19 02/21/2011 11:15 AM    Triglyceride 142 01/17/2023 01:00 AM       Lab Results   Component Value Date/Time    Hemoglobin A1c 5.8 (H) 01/17/2023 01:00 AM    Hemoglobin A1c (POC) 5.8 2015 11:42 AM       Assessment/Plan:   There are no diagnoses linked to this encounter. Health Maintenance Due   Topic Date Due    COVID-19 Vaccine (3 - Booster for Pfizer series) 2022    Flu Vaccine (1) 2022    DTaP/Tdap/Td series (2 - Td or Tdap) 2022    Cervical cancer screen  2023         Lab review: {lab reviewed:[id number]}    I have discussed the diagnosis with the patient and the intended plan as seen in the above orders. The patient has received an after-visit summary and questions were answered concerning future plans. I have discussed medication side effects and warnings with the patient as well. I have reviewed the plan of care with the patient, accepted their input and they are in agreement with the treatment goals. All questions were answered. The patient understands the plan of care. Handouts provided today with above information. ***Pt instructed if symptoms worsen to call the office or report to the ED for continued care. ***Greater than 50% of the visit time was spent in counseling and/or coordination of care. ***The patient was counseled on the dangers of tobacco use, and was {MU SMOKING CESSATION COUNSELIN::\"advised to quit\"}. Reviewed strategies to maximize success, including {techniques:}. 3-10 minutes were spent on smoking/tobacco cessation    Voice recognition was used to generate this report, which may have resulted in some phonetic based errors in grammar and contents. Even though attempts were made to correct all the mistakes, some may have been missed, and remained in the body of the document.           Mari Bailon MD 02/21/2011 11:15 AM    Triglyceride 142 01/17/2023 01:00 AM       Lab Results   Component Value Date/Time    Hemoglobin A1c 5.8 (H) 01/17/2023 01:00 AM    Hemoglobin A1c (POC) 5.8 12/24/2015 11:42 AM       Assessment/Plan:   1. Obesity, morbid, BMI 50 or higher: Her weight is 207 pounds. BMI is 52.  -Placing a referral to meet nonsurgical weight loss program.  - I encouraged her to try to exercise more regularly and to improve her diet. - We also discussed pharmacotherapy options. She will let me know if she is interested in these options    ORDERS:  - REFERRAL TO WEIGHT LOSS    2. Primary hypertension: BP is mildly elevated. White coat HTN?   -Return to clinic in 6 months to reassess her blood pressure.    -I encouraged her to check her blood pressure outside of our office and notify me if readings are persistently greater than 140/90.  -Continue medication as prescribed. If readings outside of our office are under 140/90, she can continue taking lisinopril 20 mg daily and nifedipine 90 mg daily. For now, she will continue with these medications at these doses. 3. Pre-diabetes: Her A1c is 5.8.  -I encouraged her to reduce her carb intake. - Placing an order for an A1c to be checked in 6 months. 4. TB lung, latent: Status post treatment. Resolved. Observation. 5. Hyperlipidemia: Although her LDL is not at goal, she does not fit criteria for medication at this time, which was discussed with her today.  -I encouraged him to reduce her weight by maintaining a heart healthy low-carb diet.  -We will recheck her cholesterol panel in a year. 6.  General:  - I encouraged her to get her Pap smear with her gynecologist        ICD-10-CM ICD-9-CM    1. Obesity, morbid, BMI 50 or higher ([facility]  E66.01 278.01 REFERRAL TO WEIGHT LOSS      2. Primary hypertension  I10 401.9 REFERRAL TO WEIGHT LOSS      3.  Pre-diabetes  R73.03 790.29 REFERRAL TO WEIGHT LOSS      HEMOGLOBIN A1C WITH EAG      4. TB lung, latent Z22.7 795.51       5. Hyperlipidemia, unspecified hyperlipidemia type  E78.5 272.4             Health Maintenance Due   Topic Date Due    COVID-19 Vaccine (3 - Booster for Pfizer series) 01/18/2022    Flu Vaccine (1) 08/01/2022    DTaP/Tdap/Td series (2 - Td or Tdap) 11/01/2022    Cervical cancer screen  02/13/2023         Lab review: labs are reviewed in the EHR and ordered as mentioned above. I have discussed the diagnosis with the patient and the intended plan as seen in the above orders. The patient has received an after-visit summary and questions were answered concerning future plans. I have discussed medication side effects and warnings with the patient as well. I have reviewed the plan of care with the patient, accepted their input and they are in agreement with the treatment goals. All questions were answered. The patient understands the plan of care. Handouts provided today with above information. Pt instructed if symptoms worsen to call the office or report to the ED for continued care. Greater than 50% of the visit time was spent in counseling and/or coordination of care. Voice recognition was used to generate this report, which may have resulted in some phonetic based errors in grammar and contents. Even though attempts were made to correct all the mistakes, some may have been missed, and remained in the body of the document.           Joe Marquez MD

## 2023-01-24 NOTE — TELEPHONE ENCOUNTER
Return in about 6 months (around 7/24/2023). Check out comments: Please schedule for labs 1 wk before her apt.

## 2023-02-14 DIAGNOSIS — E66.01 OBESITY, MORBID, BMI 50 OR HIGHER (HCC): Primary | ICD-10-CM

## 2023-02-14 DIAGNOSIS — I10 PRIMARY HYPERTENSION: ICD-10-CM

## 2023-05-12 RX ORDER — LISINOPRIL 20 MG/1
TABLET ORAL
Qty: 90 TABLET | Refills: 1 | Status: SHIPPED | OUTPATIENT
Start: 2023-05-12

## 2023-05-12 RX ORDER — NIFEDIPINE 90 MG/1
TABLET, EXTENDED RELEASE ORAL
Qty: 90 TABLET | Refills: 1 | Status: SHIPPED | OUTPATIENT
Start: 2023-05-12

## 2023-07-11 DIAGNOSIS — R73.03 PREDIABETES: Primary | ICD-10-CM

## 2023-11-06 DIAGNOSIS — I10 ESSENTIAL (PRIMARY) HYPERTENSION: ICD-10-CM

## 2023-11-06 DIAGNOSIS — E66.01 MORBID (SEVERE) OBESITY DUE TO EXCESS CALORIES (HCC): Primary | ICD-10-CM

## 2023-11-06 DIAGNOSIS — E78.5 HYPERLIPIDEMIA, UNSPECIFIED HYPERLIPIDEMIA TYPE: ICD-10-CM

## 2023-11-06 DIAGNOSIS — R73.03 PREDIABETES: ICD-10-CM

## 2023-11-08 RX ORDER — NIFEDIPINE 90 MG/1
90 TABLET, EXTENDED RELEASE ORAL DAILY
Qty: 90 TABLET | Refills: 0 | Status: SHIPPED | OUTPATIENT
Start: 2023-11-08

## 2023-11-08 RX ORDER — LISINOPRIL 20 MG/1
20 TABLET ORAL DAILY
Qty: 90 TABLET | Refills: 0 | Status: SHIPPED | OUTPATIENT
Start: 2023-11-08

## 2023-11-08 NOTE — TELEPHONE ENCOUNTER
Please schedule a 40 min apt with me in January for additional medication refills. Please have her scheduled for labs 1 wk before her apt.     Dr. Zeynep Dumont  [facility] of [address] [place] [address]  Phone: [phone]  Fax: [phone]

## 2024-01-31 RX ORDER — LISINOPRIL 20 MG/1
20 TABLET ORAL DAILY
Qty: 30 TABLET | Refills: 1 | Status: SHIPPED | OUTPATIENT
Start: 2024-01-31

## 2024-01-31 RX ORDER — NIFEDIPINE 90 MG/1
90 TABLET, EXTENDED RELEASE ORAL DAILY
Qty: 30 TABLET | Refills: 1 | Status: SHIPPED | OUTPATIENT
Start: 2024-01-31

## 2024-01-31 NOTE — TELEPHONE ENCOUNTER
Please let her know that she will need an in office appointment in order to get her prescription refills after today.  I have not had an in office visit with her in over a year.  She has not had lab work in over a year.  Please have her scheduled for labs and a visit with me in February or March.    Dr. Celina Go  [facility]ists of [address], [address]  [place] [address]  Phone: [phone]  Fax: [phone]

## 2024-04-01 RX ORDER — LISINOPRIL 20 MG/1
20 TABLET ORAL DAILY
Qty: 30 TABLET | Refills: 1 | OUTPATIENT
Start: 2024-04-01

## 2024-04-01 RX ORDER — NIFEDIPINE 90 MG/1
90 TABLET, EXTENDED RELEASE ORAL DAILY
Qty: 30 TABLET | Refills: 1 | OUTPATIENT
Start: 2024-04-01

## 2024-04-01 NOTE — TELEPHONE ENCOUNTER
Last OV: 1/24/2023  No future appointment  Last refill: 1/31/2024        Please call patient to schedule an appointment.

## 2024-04-03 RX ORDER — NIFEDIPINE 90 MG/1
90 TABLET, EXTENDED RELEASE ORAL DAILY
Qty: 30 TABLET | Refills: 1 | Status: CANCELLED | OUTPATIENT
Start: 2024-04-03

## 2024-04-03 RX ORDER — LISINOPRIL 20 MG/1
20 TABLET ORAL DAILY
Qty: 30 TABLET | Refills: 1 | Status: CANCELLED | OUTPATIENT
Start: 2024-04-03

## 2024-04-03 NOTE — TELEPHONE ENCOUNTER
Last OV: 01/24/23  Next OV: no scheduled OV  Last RX: 01/31/24      Please review. Last OV was 01/24/23

## 2024-04-09 ENCOUNTER — OFFICE VISIT (OUTPATIENT)
Age: 39
End: 2024-04-09
Payer: COMMERCIAL

## 2024-04-09 VITALS
HEIGHT: 64 IN | OXYGEN SATURATION: 100 % | HEART RATE: 99 BPM | DIASTOLIC BLOOD PRESSURE: 104 MMHG | TEMPERATURE: 98.2 F | RESPIRATION RATE: 19 BRPM | BODY MASS INDEX: 50.02 KG/M2 | WEIGHT: 293 LBS | SYSTOLIC BLOOD PRESSURE: 165 MMHG

## 2024-04-09 DIAGNOSIS — I10 ESSENTIAL (PRIMARY) HYPERTENSION: Primary | ICD-10-CM

## 2024-04-09 DIAGNOSIS — R73.03 PREDIABETES: ICD-10-CM

## 2024-04-09 DIAGNOSIS — E78.5 HYPERLIPIDEMIA, UNSPECIFIED HYPERLIPIDEMIA TYPE: ICD-10-CM

## 2024-04-09 PROCEDURE — 3077F SYST BP >= 140 MM HG: CPT | Performed by: INTERNAL MEDICINE

## 2024-04-09 PROCEDURE — 3080F DIAST BP >= 90 MM HG: CPT | Performed by: INTERNAL MEDICINE

## 2024-04-09 PROCEDURE — 99214 OFFICE O/P EST MOD 30 MIN: CPT | Performed by: INTERNAL MEDICINE

## 2024-04-09 RX ORDER — NIFEDIPINE 90 MG/1
90 TABLET, EXTENDED RELEASE ORAL DAILY
Qty: 30 TABLET | Refills: 1 | Status: SHIPPED | OUTPATIENT
Start: 2024-04-09

## 2024-04-09 RX ORDER — LISINOPRIL 20 MG/1
20 TABLET ORAL DAILY
Qty: 30 TABLET | Refills: 1 | Status: SHIPPED | OUTPATIENT
Start: 2024-04-09

## 2024-04-09 SDOH — ECONOMIC STABILITY: HOUSING INSECURITY
IN THE LAST 12 MONTHS, WAS THERE A TIME WHEN YOU DID NOT HAVE A STEADY PLACE TO SLEEP OR SLEPT IN A SHELTER (INCLUDING NOW)?: NO

## 2024-04-09 SDOH — ECONOMIC STABILITY: INCOME INSECURITY: HOW HARD IS IT FOR YOU TO PAY FOR THE VERY BASICS LIKE FOOD, HOUSING, MEDICAL CARE, AND HEATING?: NOT HARD AT ALL

## 2024-04-09 SDOH — ECONOMIC STABILITY: FOOD INSECURITY: WITHIN THE PAST 12 MONTHS, THE FOOD YOU BOUGHT JUST DIDN'T LAST AND YOU DIDN'T HAVE MONEY TO GET MORE.: NEVER TRUE

## 2024-04-09 SDOH — ECONOMIC STABILITY: FOOD INSECURITY: WITHIN THE PAST 12 MONTHS, YOU WORRIED THAT YOUR FOOD WOULD RUN OUT BEFORE YOU GOT MONEY TO BUY MORE.: NEVER TRUE

## 2024-04-09 ASSESSMENT — PATIENT HEALTH QUESTIONNAIRE - PHQ9
SUM OF ALL RESPONSES TO PHQ QUESTIONS 1-9: 0
2. FEELING DOWN, DEPRESSED OR HOPELESS: NOT AT ALL
SUM OF ALL RESPONSES TO PHQ9 QUESTIONS 1 & 2: 0
1. LITTLE INTEREST OR PLEASURE IN DOING THINGS: NOT AT ALL
SUM OF ALL RESPONSES TO PHQ QUESTIONS 1-9: 0

## 2024-04-09 ASSESSMENT — ENCOUNTER SYMPTOMS
COUGH: 0
SORE THROAT: 0
BLOOD IN STOOL: 0
ANAL BLEEDING: 0
EYE PAIN: 0
SHORTNESS OF BREATH: 0
ABDOMINAL PAIN: 0

## 2024-04-09 NOTE — PROGRESS NOTES
Rosalia Lantigua presents today for   Chief Complaint   Patient presents with    Follow-up    Medication Refill           \"Have you been to the ER, urgent care clinic since your last visit?  Hospitalized since your last visit?\"    NO    “Have you seen or consulted any other health care providers outside of [facility] since your last visit?”    Velocity-congestion        “Have you had a pap smear?”    NO    Date of last Cervical Cancer screen (HPV or PAP): 2/13/2018

## 2024-04-09 NOTE — PROGRESS NOTES
[facility] OF [facility]:  4/9/2024, MRN: [id number]      Rosalia Lantigua is a 38 y.o. female and presents to clinic for Follow-up and Medication Refill      Subjective:   The patient is a 38-year-old female with history of obesity, prediabetes, LTBI (on 3 months of rifampin and INH), and hypertension.     1.  Hypertension: Blood pressure is 152/99.  She needs refills on lisinopril 20 mg daily and nifedipine 90 mg daily. She has Has off/on. They are \"just enough to aggravate me.\" +Gained weight. Weight is 328lbs. +Snoring.    2.  Prediabetes/hyperlipidemia: She is overdue for lab work.  Her LDL was 119 previously checked and her A1c was checked last year measuring 5.8.  She has a history of transaminitis but has never had any right upper quadrant ultrasound despite it being ordered in the past.      Patient Active Problem List    Diagnosis Date Noted    Morbid obesity due to excess calories (HCC) 08/27/2017    Pre-diabetes 12/24/2015    HTN (hypertension) 01/27/2011       Current Outpatient Medications   Medication Sig Dispense Refill    lisinopril (PRINIVIL;ZESTRIL) 20 MG tablet Take 1 tablet by mouth daily 30 tablet 1    NIFEdipine (PROCARDIA XL) 90 MG extended release tablet Take 1 tablet by mouth daily 30 tablet 1    levonorgestrel (MIRENA) IUD 52 mg 1 each by IntraUTERine route once       No current facility-administered medications for this visit.       No Known Allergies    Past Medical History:   Diagnosis Date    HTN (hypertension) 1/27/2011    Morbid obesity due to excess calories (HCC) 8/27/2017       Past Surgical History:   Procedure Laterality Date    HEENT  2 months old    tear duct stenosis    UROLOGICAL SURGERY      one kidney a little small, but OK with maturation       Family History   Problem Relation Age of Onset    Cancer Maternal Aunt         leukemia    Elevated Lipids Paternal Grandfather     Hypertension Paternal Grandfather     Elevated Lipids Paternal Grandmother     Heart Disease

## 2024-05-26 DIAGNOSIS — I10 ESSENTIAL (PRIMARY) HYPERTENSION: ICD-10-CM

## 2024-05-28 NOTE — TELEPHONE ENCOUNTER
Last OV: 04/09/24  Next OV: 06/04/24  Last RX: 04/09/24-lisinopril                               -nifedipine

## 2024-05-29 RX ORDER — NIFEDIPINE 90 MG/1
90 TABLET, EXTENDED RELEASE ORAL DAILY
Qty: 30 TABLET | Refills: 0 | Status: SHIPPED | OUTPATIENT
Start: 2024-05-29

## 2024-05-29 RX ORDER — LISINOPRIL 20 MG/1
20 TABLET ORAL DAILY
Qty: 30 TABLET | Refills: 0 | Status: SHIPPED | OUTPATIENT
Start: 2024-05-29

## 2024-05-29 NOTE — TELEPHONE ENCOUNTER
Please let the patient know that she needs to have labs prior to her upcoming appointment in order to get any additional refills. I haven't had any labs since January of 2023.       Dr. Celina Go  [facility] of [address], [address]  [place] [address]  Phone: [phone]  Fax: [phone]

## 2024-06-11 ENCOUNTER — NURSE ONLY (OUTPATIENT)
Facility: CLINIC | Age: 39
End: 2024-06-11

## 2024-06-11 DIAGNOSIS — E78.5 HYPERLIPIDEMIA, UNSPECIFIED HYPERLIPIDEMIA TYPE: ICD-10-CM

## 2024-06-11 DIAGNOSIS — R73.03 PREDIABETES: ICD-10-CM

## 2024-06-11 DIAGNOSIS — I10 ESSENTIAL (PRIMARY) HYPERTENSION: ICD-10-CM

## 2024-06-20 ENCOUNTER — OFFICE VISIT (OUTPATIENT)
Facility: CLINIC | Age: 39
End: 2024-06-20
Payer: COMMERCIAL

## 2024-06-20 VITALS
HEART RATE: 97 BPM | DIASTOLIC BLOOD PRESSURE: 90 MMHG | WEIGHT: 293 LBS | HEIGHT: 64 IN | SYSTOLIC BLOOD PRESSURE: 150 MMHG | TEMPERATURE: 98.9 F | BODY MASS INDEX: 50.02 KG/M2 | OXYGEN SATURATION: 99 %

## 2024-06-20 DIAGNOSIS — I10 ESSENTIAL (PRIMARY) HYPERTENSION: ICD-10-CM

## 2024-06-20 DIAGNOSIS — E11.9 TYPE 2 DIABETES MELLITUS WITHOUT COMPLICATION, UNSPECIFIED WHETHER LONG TERM INSULIN USE (HCC): Primary | ICD-10-CM

## 2024-06-20 PROCEDURE — 3080F DIAST BP >= 90 MM HG: CPT | Performed by: INTERNAL MEDICINE

## 2024-06-20 PROCEDURE — 99214 OFFICE O/P EST MOD 30 MIN: CPT | Performed by: INTERNAL MEDICINE

## 2024-06-20 PROCEDURE — 3077F SYST BP >= 140 MM HG: CPT | Performed by: INTERNAL MEDICINE

## 2024-06-20 PROCEDURE — 3044F HG A1C LEVEL LT 7.0%: CPT | Performed by: INTERNAL MEDICINE

## 2024-06-20 NOTE — PATIENT INSTRUCTIONS
Learning About Tests When You Have Diabetes  Why do you need regular tests?     Diabetes can lead to other health problems if it's not well managed. You'll need tests to monitor how well your diabetes is managed and to check for other things like high cholesterol or kidney problems. Having tests on a regular schedule can help your doctor find problems early, when it's best to start treating them.  What tests do you need?  These are the tests you may need and how often you should have them. The tests may vary depending on whether you have type 1 or type 2 diabetes.  A1c blood test.  This test shows the average level of blood sugar over the past 2 to 3 months. It helps your doctor see whether blood sugar levels have been staying within your target range.  How often: Every 3 to 6 months  Goal: A blood sugar level in your target range  Blood pressure test.  This test measures the pressure of blood flow in the arteries. Controlling blood pressure can help prevent damage to nerves and blood vessels.  How often: Every 3 to 6 months  Goal: A blood pressure level in your target range  Cholesterol test.  This test measures the amount of a type of fat in the blood. It is common for people with diabetes to also have high cholesterol. Too much cholesterol in the blood can build up inside the blood vessels and raise the risk for heart attack and stroke.  How often: At the time of your diabetes diagnosis, and as often as your doctor recommends after that  Goal: A cholesterol level in your target range  Albumin-creatinine ratio test.  This test checks for kidney damage by looking for the protein albumin (say \"al-BYOO-mitzy\") in the urine. Albumin is normally found in the blood. Kidney damage can let small amounts of it (microalbumin) leak into the urine.  How often: Once a year  Goal: No protein in the urine  Blood creatinine test/estimated glomerular filtration (eGFR).  The blood creatinine (say \"hmid-DP-jq-neen\") level shows

## 2024-06-20 NOTE — PROGRESS NOTES
Rosalia VINOD Lantigua presents today for   Chief Complaint   Patient presents with    Follow-up                 1. \"Have you been to the ER, urgent care clinic since your last visit?  Hospitalized since your last visit?\" no    2. \"Have you seen or consulted any other health care providers outside of the [facility] since your last visit?\" no     3. For patients aged 45-75: Has the patient had a colonoscopy / FIT/ Cologuard? No      If the patient is female:    4. For patients aged 40-74: Has the patient had a mammogram within the past 2 years? No      5. For patients aged 21-65: Has the patient had a pap smear? No

## 2024-06-20 NOTE — PROGRESS NOTES
[facility] OF [facility]:  6/26/2024, MRN: [id number]      Rosalia Lantigua is a 39 y.o. female and presents to clinic for Follow-up      Subjective:   The patient is a 39-year-old female with history of obesity, type 2 diabetes, LTBI (on 3 months of rifampin and INH), and hypertension.     1. HTN: BP on 4/15/24 was 122/80.  Taking lisinopril 20 mg daily and nifedipine 90 mg daily.  Blood pressure today is 150/90.  Recent labs did not show any microalbuminuria.  Her creatinine is normal.  Her electrolytes are unremarkable.    2. Type 2 DM:  Her A1C is 6.6. Her energy has improved after cutting back on her carbs.  She is not on any medication as she is a newly diagnosed type II diabetic per her A1c.    3.  Hyperlipidemia: She is on a cholesterol-lowering medication.     Latest Reference Range & Units 06/11/24 00:00   Cholesterol, Total 100 - 199 mg/dL 164   HDL Cholesterol >39 mg/dL 33 (L)   LDL Cholesterol 0 - 99 mg/dL 92   Triglycerides 0 - 149 mg/dL 230 (H)   (L): Data is abnormally low  (H): Data is abnormally high      Patient Active Problem List    Diagnosis Date Noted    Morbid obesity due to excess calories (HCC) 08/27/2017    Pre-diabetes 12/24/2015    HTN (hypertension) 01/27/2011       Current Outpatient Medications   Medication Sig Dispense Refill    metFORMIN (GLUCOPHAGE) 500 MG tablet Take 1 tablet by mouth daily (with breakfast) 30 tablet 5    lisinopril (PRINIVIL;ZESTRIL) 20 MG tablet TAKE ONE TABLET BY MOUTH ONCE A DAY 30 tablet 0    NIFEdipine (PROCARDIA XL) 90 MG extended release tablet TAKE ONE TABLET BY MOUTH ONCE A DAY 30 tablet 0    levonorgestrel (MIRENA) IUD 52 mg 1 each by IntraUTERine route once       No current facility-administered medications for this visit.       No Known Allergies    Past Medical History:   Diagnosis Date    HTN (hypertension) 1/27/2011    Morbid obesity due to excess calories (HCC) 8/27/2017       Past Surgical History:   Procedure Laterality Date    HEENT  2 months

## 2024-06-22 DIAGNOSIS — I10 ESSENTIAL (PRIMARY) HYPERTENSION: ICD-10-CM

## 2024-06-26 ASSESSMENT — ENCOUNTER SYMPTOMS
EYE PAIN: 0
ABDOMINAL PAIN: 0
BLOOD IN STOOL: 0
COUGH: 0
SORE THROAT: 0
ANAL BLEEDING: 0
SHORTNESS OF BREATH: 0

## 2024-06-27 RX ORDER — LISINOPRIL 20 MG/1
20 TABLET ORAL DAILY
Qty: 90 TABLET | Refills: 1 | Status: SHIPPED | OUTPATIENT
Start: 2024-06-27

## 2024-06-27 RX ORDER — NIFEDIPINE 90 MG/1
90 TABLET, EXTENDED RELEASE ORAL DAILY
Qty: 90 TABLET | Refills: 1 | Status: SHIPPED | OUTPATIENT
Start: 2024-06-27

## 2024-08-15 NOTE — PROGRESS NOTES
Pharmacy Progress Note - Diabetes Management       Assessment / Plan:   Diabetes Management:  Per ADA guidelines, Pt's A1c is at goal of < 7%.  Unable to assess her glycemic control d/t a lack of SMBG logs.  Will send in a Rx for a glucometer to start checking her FBG values a few times of the week to monitor.  This will also allow her to check occasionally after meals for her information.  She would benefit from a GLP-1RA given her morbid obesity, but will wait for next A1c to adjust/add medications.  Will reassess with SMBG logs at follow up in 6 weeks.     Hyperlipidemia:  The 10-year ASCVD risk score (Yadira CASTILLO, et al., 2019) is: 1.9% based on parameters listed. Current lipid treatment guidelines recommend at least moderate-intensity statin doses for all patients with diabetes to decrease overall ASCVD risk. Patient currently qualifies for a moderate intensity statin therapy based on current recommendations and is not currently taking a statin.  - Start Lipitor 10mg qhs - educated on potential SE     Nutrition/Lifestyle Modifications:  - Educated pt on the importance of moderating carbohydrate intake. Reviewed sources of carbohydrates and method to help determine appropriate portion sizes (e.g., Diabetes Plate Method).  - Advised patient to avoid sugar-sweetened beverages and replace with water or diet/zero sugar option.  - Recommend ~30 minutes consistent, moderately intensive, exercise/day or ~150 minutes/week. Start small, stay consistent, and increase length and types of exercise, as tolerated.       Patient will return to clinic in 6 week(s) for follow up.        S/O: Ms. Rosalia Lantigua, a 39 y.o. female referred by Celina Go MD,  has a past medical history of HTN (hypertension) and Morbid obesity due to excess calories (HCC).  Pt was seen today for diabetes management.  Patient's last A1c was:   Hemoglobin A1C   Date Value Ref Range Status   06/11/2024 6.6 (H) 4.8 - 5.6 % Final     Comment:

## 2024-08-19 ENCOUNTER — OFFICE VISIT (OUTPATIENT)
Facility: CLINIC | Age: 39
End: 2024-08-19
Payer: COMMERCIAL

## 2024-08-19 ENCOUNTER — PHARMACY VISIT (OUTPATIENT)
Facility: CLINIC | Age: 39
End: 2024-08-19

## 2024-08-19 VITALS
HEIGHT: 64 IN | BODY MASS INDEX: 50.02 KG/M2 | RESPIRATION RATE: 18 BRPM | DIASTOLIC BLOOD PRESSURE: 74 MMHG | HEART RATE: 97 BPM | SYSTOLIC BLOOD PRESSURE: 132 MMHG | WEIGHT: 293 LBS | OXYGEN SATURATION: 93 % | TEMPERATURE: 99 F

## 2024-08-19 DIAGNOSIS — E11.9 TYPE 2 DIABETES MELLITUS WITHOUT COMPLICATION, UNSPECIFIED WHETHER LONG TERM INSULIN USE (HCC): Primary | ICD-10-CM

## 2024-08-19 DIAGNOSIS — I10 ESSENTIAL (PRIMARY) HYPERTENSION: ICD-10-CM

## 2024-08-19 DIAGNOSIS — Z12.31 ENCOUNTER FOR SCREENING MAMMOGRAM FOR BREAST CANCER: ICD-10-CM

## 2024-08-19 DIAGNOSIS — E78.5 HYPERLIPIDEMIA, UNSPECIFIED HYPERLIPIDEMIA TYPE: ICD-10-CM

## 2024-08-19 PROCEDURE — 3078F DIAST BP <80 MM HG: CPT | Performed by: INTERNAL MEDICINE

## 2024-08-19 PROCEDURE — 3075F SYST BP GE 130 - 139MM HG: CPT | Performed by: INTERNAL MEDICINE

## 2024-08-19 PROCEDURE — 99214 OFFICE O/P EST MOD 30 MIN: CPT | Performed by: INTERNAL MEDICINE

## 2024-08-19 PROCEDURE — 3044F HG A1C LEVEL LT 7.0%: CPT | Performed by: INTERNAL MEDICINE

## 2024-08-19 RX ORDER — LANCETS 30 GAUGE
EACH MISCELLANEOUS
Qty: 100 EACH | Refills: 5 | Status: SHIPPED | OUTPATIENT
Start: 2024-08-19

## 2024-08-19 RX ORDER — ATORVASTATIN CALCIUM 10 MG/1
10 TABLET, FILM COATED ORAL NIGHTLY
Qty: 90 TABLET | Refills: 3 | Status: SHIPPED | OUTPATIENT
Start: 2024-08-19

## 2024-08-19 RX ORDER — DIPHENHYDRAMINE HYDROCHLORIDE 25 MG/1
CAPSULE, LIQUID FILLED ORAL
Qty: 1 KIT | Refills: 0 | Status: SHIPPED | OUTPATIENT
Start: 2024-08-19

## 2024-08-19 RX ORDER — GLUCOSAMINE HCL/CHONDROITIN SU 500-400 MG
CAPSULE ORAL
Qty: 100 STRIP | Refills: 3 | Status: SHIPPED | OUTPATIENT
Start: 2024-08-19

## 2024-08-19 ASSESSMENT — ENCOUNTER SYMPTOMS
EYE PAIN: 0
ABDOMINAL PAIN: 0
COUGH: 0
SHORTNESS OF BREATH: 0
SORE THROAT: 0
BLOOD IN STOOL: 0
ANAL BLEEDING: 0

## 2024-08-19 NOTE — PATIENT INSTRUCTIONS
Your Visit Summary:     Plan:  - Start Lipitor 10mg every night    For any questions, please call [phone] or your PCP office.    Check and document your blood sugar first thing in the morning (fasting at least 8 hours), 2 hours after a meal, and/or before bedtime.   Bring your meter/log to all future visits.     Your blood sugar goals:  - Fasting (first thing in the morning)  blood sugar: 80 - 130mg/dL  - 2 hours after a meal: 80 - 180mg/dL    When you experience symptoms of low blood sugar (example: less than 70):  - Confirm low reading by checking your blood sugar.   - Then treat with 15 grams of carbohydrates (one-half cup of juice or regular soda, or 4-5 glucose tablets).  - Wait 15 minutes to recheck blood sugar.   - Then eat a protein containing meal/snack to prevent another low blood sugar episode. (example: peanut butter + crackers)    Nutrition:  - When reviewing a nutrition label, focus on the serving size, total calories, fat (and type of fats), total carbohydrates, sugar (and amount of added sugar), amount of fiber (good for your digestive), and amount of protein.  Refer to your nutrition label guide for more information.  - For a meal : max 45 - 60 grams of carbohydrates  - For a snack: max 15 grams of carbohydrates  - Reduce amount of saturated and trans fat.  Consider more unsaturated fat options as they are better for your heart health.   - Have at least 1 serving of lean fat protein with each meal.    - Increase fiber intake slowly to prevent constipation.   - Substitute fruit juices for the whole fruit    Low carb snack ideas (15 grams total carb or less):    String cheese or babybel with 6 crackers  4 peanut butter crackers  3 cups of popcorn  1 cup raw vegetables with hummus or ranch dip (just need to watch how much dip you use)  Nuts  2 rice cakes  Celery with peanut butter or cream cheese  String cheese with 1 serving of fruit  Greek yogurt (look at label to make sure < 15 gram

## 2024-08-19 NOTE — PROGRESS NOTES
Rosalia Lantigua is a 39 y.o. female (: 1985) presenting to address:    Chief Complaint   Patient presents with    Follow-up     2 month follow up       Vitals:    24 1427   BP: 132/74   Pulse: 97   Resp: 18   Temp: 99 °F (37.2 °C)   SpO2: 93%       \"Have you been to the ER, urgent care clinic since your last visit?  Hospitalized since your last visit?\"    NO    “Have you seen or consulted any other health care providers outside of [facility] since your last visit?”    NO       Have you had a mammogram?”   NO    No breast cancer screening on file      “Have you had a pap smear?”    NO    Date of last Cervical Cancer screen (HPV or PAP): 2018           
Thigh)   Pulse 97   Temp 99 °F (37.2 °C) (Temporal)   Resp 18   Ht 1.626 m (5' 4\")   Wt (!) 143.8 kg (317 lb)   LMP  (LMP Unknown)   SpO2 93%   BMI 54.41 kg/m²      Physical Exam  Constitutional:       Appearance: Normal appearance.   HENT:      Head: Normocephalic and atraumatic.      Right Ear: External ear normal.      Left Ear: External ear normal.   Eyes:      General: No scleral icterus.        Right eye: No discharge.         Left eye: No discharge.      Conjunctiva/sclera: Conjunctivae normal.   Cardiovascular:      Rate and Rhythm: Normal rate and regular rhythm.      Pulses: Normal pulses.      Heart sounds: Normal heart sounds.   Pulmonary:      Effort: Pulmonary effort is normal.      Breath sounds: Normal breath sounds.   Abdominal:      General: Abdomen is flat. Bowel sounds are normal. There is no distension.      Palpations: Abdomen is soft.   Musculoskeletal:         General: No swelling (BUE) or tenderness (BUE).      Cervical back: Neck supple.   Lymphadenopathy:      Cervical: No cervical adenopathy.   Skin:     General: Skin is warm and dry.      Findings: No erythema.   Neurological:      Mental Status: She is alert. Mental status is at baseline.      Gait: Gait normal.   Psychiatric:         Mood and Affect: Mood normal.           LABS   Data Review:   No results found for: \"WBC\", \"WBCLT\", \"HGBPOC\", \"HGB\", \"HGBP\", \"HCTPOC\", \"HCT\", \"PHCT\", \"PLT\", \"MCV\"    Lab Results   Component Value Date/Time     06/11/2024 12:00 AM    K 4.9 06/11/2024 12:00 AM    CL 99 06/11/2024 12:00 AM    CO2 23 06/11/2024 12:00 AM    BUN 14 06/11/2024 12:00 AM       Lab Results   Component Value Date/Time    CHOL 164 06/11/2024 12:00 AM    HDL 33 06/11/2024 12:00 AM    LDL 92 06/11/2024 12:00 AM     01/17/2023 01:00 AM    VLDL 39 06/11/2024 12:00 AM    VLDL 25 01/17/2023 01:00 AM       No results found for: \"HBA1C\", \"[id number]\"    Assessment/Plan:   1.  Health maintenance:  -Ordering mammogram to

## 2024-09-16 DIAGNOSIS — E11.9 TYPE 2 DIABETES MELLITUS WITHOUT COMPLICATION, UNSPECIFIED WHETHER LONG TERM INSULIN USE (HCC): ICD-10-CM

## 2024-11-06 LAB
ALBUMIN SERPL-MCNC: 4 G/DL (ref 3.9–4.9)
ALP SERPL-CCNC: 104 IU/L (ref 44–121)
ALT SERPL-CCNC: 32 IU/L (ref 0–32)
AST SERPL-CCNC: 23 IU/L (ref 0–40)
BILIRUB SERPL-MCNC: 0.3 MG/DL (ref 0–1.2)
BUN SERPL-MCNC: 11 MG/DL (ref 6–24)
BUN/CREAT SERPL: 14 (ref 9–23)
CALCIUM SERPL-MCNC: 8.9 MG/DL (ref 8.7–10.2)
CHLORIDE SERPL-SCNC: 100 MMOL/L (ref 96–106)
CHOLEST SERPL-MCNC: 155 MG/DL (ref 100–199)
CO2 SERPL-SCNC: 25 MMOL/L (ref 20–29)
CREAT SERPL-MCNC: 0.79 MG/DL (ref 0.57–1)
EGFRCR SERPLBLD CKD-EPI 2021: 97 ML/MIN/1.73
GLOBULIN SER CALC-MCNC: 2.6 G/DL (ref 1.5–4.5)
GLUCOSE SERPL-MCNC: 82 MG/DL (ref 70–99)
HBA1C MFR BLD: 6.3 % (ref 4.8–5.6)
HDLC SERPL-MCNC: 38 MG/DL
LDLC SERPL CALC-MCNC: 79 MG/DL (ref 0–99)
POTASSIUM SERPL-SCNC: 5.1 MMOL/L (ref 3.5–5.2)
PROT SERPL-MCNC: 6.6 G/DL (ref 6–8.5)
SODIUM SERPL-SCNC: 138 MMOL/L (ref 134–144)
TRIGL SERPL-MCNC: 230 MG/DL (ref 0–149)
VLDLC SERPL CALC-MCNC: 38 MG/DL (ref 5–40)

## 2024-11-12 ENCOUNTER — OFFICE VISIT (OUTPATIENT)
Facility: CLINIC | Age: 39
End: 2024-11-12

## 2024-11-12 VITALS
TEMPERATURE: 98 F | BODY MASS INDEX: 50.02 KG/M2 | RESPIRATION RATE: 18 BRPM | SYSTOLIC BLOOD PRESSURE: 137 MMHG | OXYGEN SATURATION: 99 % | DIASTOLIC BLOOD PRESSURE: 82 MMHG | HEART RATE: 86 BPM | WEIGHT: 293 LBS | HEIGHT: 64 IN

## 2024-11-12 DIAGNOSIS — I10 ESSENTIAL (PRIMARY) HYPERTENSION: ICD-10-CM

## 2024-11-12 DIAGNOSIS — E78.5 HYPERLIPIDEMIA, UNSPECIFIED HYPERLIPIDEMIA TYPE: ICD-10-CM

## 2024-11-12 DIAGNOSIS — E11.9 TYPE 2 DIABETES MELLITUS WITHOUT COMPLICATION, UNSPECIFIED WHETHER LONG TERM INSULIN USE (HCC): Primary | ICD-10-CM

## 2024-11-12 DIAGNOSIS — Z23 NEED FOR VACCINATION: ICD-10-CM

## 2024-11-12 NOTE — PROGRESS NOTES
Rosalia BROCK Biggs 1985 female who presents for routine immunizations.   Patient denies any symptoms , reactions or allergies that would exclude them from being immunized today.  Risks and adverse reactions were discussed and the VIS was given to them. All questions were addressed.  Order placed for Flucelvax,  per Verbal Order from Dr. NADJA Go MD with read back.  There were no reactions observed.    Minh Watson LPN

## 2024-11-12 NOTE — PATIENT INSTRUCTIONS
Learning About Tests When You Have Diabetes  Why do you need regular tests?     Diabetes can lead to other health problems if it's not well managed. You'll need tests to monitor how well your diabetes is managed and to check for other things like high cholesterol or kidney problems. Having tests on a regular schedule can help your doctor find problems early, when it's best to start treating them.  What tests do you need?  These are the tests you may need and how often you should have them. The tests may vary depending on whether you have type 1 or type 2 diabetes.  A1c blood test.  This test shows the average level of blood sugar over the past 2 to 3 months. It helps your doctor see whether blood sugar levels have been staying within your target range.  How often: Every 3 to 6 months  Goal: A blood sugar level in your target range  Blood pressure test.  This test measures the pressure of blood flow in the arteries. Controlling blood pressure can help prevent damage to nerves and blood vessels.  How often: Every 3 to 6 months  Goal: A blood pressure level in your target range  Cholesterol test.  This test measures the amount of a type of fat in the blood. It is common for people with diabetes to also have high cholesterol. Too much cholesterol in the blood can build up inside the blood vessels and raise the risk for heart attack and stroke.  How often: At the time of your diabetes diagnosis, and as often as your doctor recommends after that  Goal: A cholesterol level in your target range  Albumin-creatinine ratio test.  This test checks for kidney damage by looking for the protein albumin (say \"al-BYOO-mitzy\") in the urine. Albumin is normally found in the blood. Kidney damage can let small amounts of it (microalbumin) leak into the urine.  How often: Once a year  Goal: No protein in the urine  Blood creatinine test/estimated glomerular filtration (eGFR).  The blood creatinine (say \"sffg-ZC-dr-neen\") level shows

## 2024-11-12 NOTE — PROGRESS NOTES
INTERNISTS [facility]:  11/24/2024, MRN: [id number]      Rosalia Lantigua is a 39 y.o. 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      1.  Hypertension: Blood pressure is 137/82.  She is taking nifedipine 90 mg daily and lisinopril 20 mg daily.    2.  Type 2 diabetes: BMI is 56.  Her weight is 327 pounds.  Treated with metformin 500 mg daily.     Latest Reference Range & Units 06/11/24 00:00 11/05/24 00:00   Glucose 70 - 99 mg/dL 80 82   Hemoglobin A1C 4.8 - 5.6 % 6.6 (H) 6.3 (H)   (H): Data is abnormally high    3.  Hyperlipidemia: Treated with Lipitor 10 mg nightly.     Latest Reference Range & Units 06/11/24 00:00 11/05/24 00:00   Cholesterol, Total 100 - 199 mg/dL 164 155   HDL Cholesterol >39 mg/dL 33 (L) 38 (L)   LDL Cholesterol 0 - 99 mg/dL 92 79   Triglycerides 0 - 149 mg/dL 230 (H) 230 (H)   VLDL 5 - 40 mg/dL 39 38   (L): Data is abnormally low  (H): Data is abnormally high        Patient Active Problem List    Diagnosis Date Noted    Morbid obesity due to excess calories 08/27/2017    Pre-diabetes 12/24/2015    HTN (hypertension) 01/27/2011       Current Outpatient Medications   Medication Sig Dispense Refill    Semaglutide,0.25 or 0.5MG/DOS, 2 MG/1.5ML SOPN Inject subcutaneously 0.25mg every 7 days for 4 doses followed by 0.5mg every 7 days thereafter 4 Adjustable Dose Pre-filled Pen Syringe 3    metFORMIN (GLUCOPHAGE) 500 MG tablet TAKE ONE TABLET BY MOUTH ONCE A DAY WITH BREAKFAST 30 tablet 5    atorvastatin (LIPITOR) 10 MG tablet Take 1 tablet by mouth at bedtime 90 tablet 3    Blood Glucose Monitoring Suppl (BLOOD GLUCOSE MONITOR SYSTEM) w/Device KIT Use to monitor blood glucose daily - fill with insurance-covered item 1 kit 0    blood glucose monitor strips Use to monitor blood glucose daily - fill with insurance-covered item. 100 strip 3    Lancets

## 2024-11-12 NOTE — PROGRESS NOTES
Noted. Thank you.    Rosalia Lantigua presents today for   Chief Complaint   Patient presents with    Follow-up           \"Have you been to the ER, urgent care clinic since your last visit?  Hospitalized since your last visit?\"    NO    “Have you seen or consulted any other health care providers outside of [facility] since your last visit?”    NO       Have you had a mammogram?”   NO    No breast cancer screening on file      “Have you had a pap smear?”    NO    Date of last Cervical Cancer screen (HPV or PAP): 2/13/2018

## 2024-11-14 ENCOUNTER — PATIENT MESSAGE (OUTPATIENT)
Facility: CLINIC | Age: 39
End: 2024-11-14

## 2024-12-09 ENCOUNTER — TELEPHONE (OUTPATIENT)
Dept: PHARMACY | Facility: CLINIC | Age: 39
End: 2024-12-09

## 2024-12-09 NOTE — TELEPHONE ENCOUNTER
**[facility] is to [facility] with [facility]**    Attempt made to contact patient at the home number.    Patient Cancelled appointment on 12/10/24 at 1:00 pm with Fer Rodriguez.    Left a message requesting a call back at [phone] to schedule the appointment    *Letter Sent*    Charlene Castelan [facility]   Ambulatory Pharmacy Clinical   [phone]  Department, toll free: [phone], option 2       For Pharmacy Admin Tracking Only    Program: Medical Group  Gap Closed?: No   Time Spent (min): 5

## 2024-12-16 DIAGNOSIS — I10 ESSENTIAL (PRIMARY) HYPERTENSION: ICD-10-CM

## 2024-12-16 RX ORDER — NIFEDIPINE 90 MG/1
90 TABLET, EXTENDED RELEASE ORAL DAILY
Qty: 90 TABLET | Refills: 1 | Status: SHIPPED | OUTPATIENT
Start: 2024-12-16

## 2024-12-16 RX ORDER — LISINOPRIL 20 MG/1
20 TABLET ORAL DAILY
Qty: 90 TABLET | Refills: 1 | Status: SHIPPED | OUTPATIENT
Start: 2024-12-16

## 2024-12-16 NOTE — TELEPHONE ENCOUNTER
PCP: Celina Go MD    LAST OFFICE VISIT: 11/12/2024    LAST REFILL PER CHART:  Medication:NIFEdipine (PROCARDIA XL) 90 MG extended release tablet   Ordered On:06/27/2024  Instructions:TAKE ONE TABLET BY MOUTH ONCE A DAY   Dispense:90 tablets  Refills:1      LAST REFILL PER CHART:  Medication:lisinopril (PRINIVIL;ZESTRIL) 20 MG tablet   Ordered On:06/27/2024  Instructions:TAKE ONE TABLET BY MOUTH ONCE A DAY   Dispense:90 tablets  Refills:1      Future Appointments   Date Time Provider Department Center   3/4/2025 11:45 AM IOC LAB VISIT [facility] DEP   3/11/2025  9:20 AM Celina Go MD [facility] DEP

## 2025-03-04 ENCOUNTER — LAB (OUTPATIENT)
Facility: CLINIC | Age: 40
End: 2025-03-04

## 2025-03-04 DIAGNOSIS — E11.9 TYPE 2 DIABETES MELLITUS WITHOUT COMPLICATION, UNSPECIFIED WHETHER LONG TERM INSULIN USE (HCC): ICD-10-CM

## 2025-03-05 LAB — HBA1C MFR BLD: 5.8 % (ref 4.8–5.6)

## 2025-03-10 SDOH — ECONOMIC STABILITY: TRANSPORTATION INSECURITY
IN THE PAST 12 MONTHS, HAS LACK OF TRANSPORTATION KEPT YOU FROM MEETINGS, WORK, OR FROM GETTING THINGS NEEDED FOR DAILY LIVING?: NO

## 2025-03-10 SDOH — ECONOMIC STABILITY: FOOD INSECURITY: WITHIN THE PAST 12 MONTHS, THE FOOD YOU BOUGHT JUST DIDN'T LAST AND YOU DIDN'T HAVE MONEY TO GET MORE.: NEVER TRUE

## 2025-03-10 SDOH — ECONOMIC STABILITY: FOOD INSECURITY: WITHIN THE PAST 12 MONTHS, YOU WORRIED THAT YOUR FOOD WOULD RUN OUT BEFORE YOU GOT MONEY TO BUY MORE.: NEVER TRUE

## 2025-03-10 SDOH — ECONOMIC STABILITY: INCOME INSECURITY: IN THE LAST 12 MONTHS, WAS THERE A TIME WHEN YOU WERE NOT ABLE TO PAY THE MORTGAGE OR RENT ON TIME?: NO

## 2025-03-10 SDOH — ECONOMIC STABILITY: TRANSPORTATION INSECURITY
IN THE PAST 12 MONTHS, HAS THE LACK OF TRANSPORTATION KEPT YOU FROM MEDICAL APPOINTMENTS OR FROM GETTING MEDICATIONS?: NO

## 2025-03-11 ENCOUNTER — OFFICE VISIT (OUTPATIENT)
Facility: CLINIC | Age: 40
End: 2025-03-11
Payer: COMMERCIAL

## 2025-03-11 VITALS
WEIGHT: 293 LBS | TEMPERATURE: 98.4 F | HEIGHT: 64 IN | BODY MASS INDEX: 50.02 KG/M2 | OXYGEN SATURATION: 99 % | RESPIRATION RATE: 18 BRPM | SYSTOLIC BLOOD PRESSURE: 123 MMHG | HEART RATE: 83 BPM | DIASTOLIC BLOOD PRESSURE: 78 MMHG

## 2025-03-11 DIAGNOSIS — E11.9 TYPE 2 DIABETES MELLITUS WITHOUT COMPLICATION, UNSPECIFIED WHETHER LONG TERM INSULIN USE (HCC): Primary | ICD-10-CM

## 2025-03-11 DIAGNOSIS — I10 ESSENTIAL (PRIMARY) HYPERTENSION: ICD-10-CM

## 2025-03-11 DIAGNOSIS — Z12.31 ENCOUNTER FOR SCREENING MAMMOGRAM FOR BREAST CANCER: ICD-10-CM

## 2025-03-11 DIAGNOSIS — E66.813 OBESITY, CLASS 3: ICD-10-CM

## 2025-03-11 PROCEDURE — 3044F HG A1C LEVEL LT 7.0%: CPT | Performed by: INTERNAL MEDICINE

## 2025-03-11 PROCEDURE — 99214 OFFICE O/P EST MOD 30 MIN: CPT | Performed by: INTERNAL MEDICINE

## 2025-03-11 PROCEDURE — 3078F DIAST BP <80 MM HG: CPT | Performed by: INTERNAL MEDICINE

## 2025-03-11 PROCEDURE — 3074F SYST BP LT 130 MM HG: CPT | Performed by: INTERNAL MEDICINE

## 2025-03-11 ASSESSMENT — PATIENT HEALTH QUESTIONNAIRE - PHQ9
SUM OF ALL RESPONSES TO PHQ QUESTIONS 1-9: 0
SUM OF ALL RESPONSES TO PHQ QUESTIONS 1-9: 0
1. LITTLE INTEREST OR PLEASURE IN DOING THINGS: NOT AT ALL
SUM OF ALL RESPONSES TO PHQ QUESTIONS 1-9: 0
2. FEELING DOWN, DEPRESSED OR HOPELESS: NOT AT ALL
SUM OF ALL RESPONSES TO PHQ QUESTIONS 1-9: 0

## 2025-03-11 ASSESSMENT — ENCOUNTER SYMPTOMS
EYE PAIN: 0
ABDOMINAL PAIN: 0
SHORTNESS OF BREATH: 0
BLOOD IN STOOL: 0
ANAL BLEEDING: 0
COUGH: 0
SORE THROAT: 0

## 2025-03-11 NOTE — PROGRESS NOTES
Rosalia Lantigua presents today for   Chief Complaint   Patient presents with    Follow-up     4 mon f/u    Diabetes     4 mon f/u           \"Have you been to the ER, urgent care clinic since your last visit?  Hospitalized since your last visit?\"    NO    “Have you seen or consulted any other health care providers outside of [facility] since your last visit?”    YES - When: approximately 3 months ago.  Where and Why: Gynecology-f/u.       Have you had a mammogram?”   NO    No breast cancer screening on file      “Have you had a pap smear?”    YES - Where: [facility] Nurse/[facility] to request most recent records if not in the chart    Date of last Cervical Cancer screen (HPV or PAP): 2/13/2018

## 2025-03-11 NOTE — PATIENT INSTRUCTIONS
Learning About Tests When You Have Diabetes  Why do you need regular tests?     Diabetes can lead to other health problems if it's not well managed. You'll need tests to monitor how well your diabetes is managed and to check for other things like high cholesterol or kidney problems. Having tests on a regular schedule can help your doctor find problems early, when it's best to start treating them.  What tests do you need?  These are the tests you may need and how often you should have them. The tests may vary depending on whether you have type 1 or type 2 diabetes.  A1c blood test.  This test shows the average level of blood sugar over the past 2 to 3 months. It helps your doctor see whether blood sugar levels have been staying within your target range.  How often: Every 3 to 6 months  Goal: A blood sugar level in your target range  Blood pressure test.  This test measures the pressure of blood flow in the arteries. Controlling blood pressure can help prevent damage to nerves and blood vessels.  How often: Every 3 to 6 months  Goal: A blood pressure level in your target range  Cholesterol test.  This test measures the amount of a type of fat in the blood. It is common for people with diabetes to also have high cholesterol. Too much cholesterol in the blood can build up inside the blood vessels and raise the risk for heart attack and stroke.  How often: At the time of your diabetes diagnosis, and as often as your doctor recommends after that  Goal: A cholesterol level in your target range  Albumin-creatinine ratio test.  This test checks for kidney damage by looking for the protein albumin (say \"al-BYOO-mitzy\") in the urine. Albumin is normally found in the blood. Kidney damage can let small amounts of it (microalbumin) leak into the urine.  How often: Once a year  Goal: No protein in the urine  Blood creatinine test/estimated glomerular filtration (eGFR).  The blood creatinine (say \"jlaw-RR-yp-neen\") level shows

## 2025-03-11 NOTE — PROGRESS NOTES
INTERNISTS [facility]:  3/11/2025, MRN: [id number]      Rosalia Lantigua is a 39 y.o. 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      1.  Hypertension: Blood pressure is 123/78 on nifedipine 90 mg daily and lisinopril 20 mg daily.    2.  Type 2 diabetes: Treated with metformin 500 mg daily.  At her last visit I ordered Ozempic.  She is on Ozempic 0.5 mg weekly.  Today she reports: she is doing well with portion control.  Weight today is 314 pounds.  She was 332 pounds in June 2024.     Latest Reference Range & Units 06/11/24 00:00 11/05/24 00:00 03/04/25 00:00   Hemoglobin A1C 4.8 - 5.6 % 6.6 (H) 6.3 (H) 5.8 (H)   (H): Data is abnormally high      Patient Active Problem List    Diagnosis Date Noted    Type 2 diabetes mellitus without complication, unspecified whether long term insulin use (HCC) 03/11/2025    Obesity, class 3 (E66.813) 03/11/2025    Body mass index [BMI] 50.0-59.9, adult (Z68.43) 03/11/2025    Morbid obesity due to excess calories 08/27/2017    Pre-diabetes 12/24/2015    HTN (hypertension) 01/27/2011       Current Outpatient Medications   Medication Sig Dispense Refill    Semaglutide, 1 MG/DOSE, (OZEMPIC) 4 MG/3ML SOPN sc injection Inject 1 mg into the skin every 7 days 4 Adjustable Dose Pre-filled Pen Syringe 3    lisinopril (PRINIVIL;ZESTRIL) 20 MG tablet TAKE ONE TABLET BY MOUTH ONCE A DAY 90 tablet 1    NIFEdipine (PROCARDIA XL) 90 MG extended release tablet TAKE ONE TABLET BY MOUTH ONCE A DAY 90 tablet 1    metFORMIN (GLUCOPHAGE) 500 MG tablet TAKE ONE TABLET BY MOUTH ONCE A DAY WITH BREAKFAST 30 tablet 5    atorvastatin (LIPITOR) 10 MG tablet Take 1 tablet by mouth at bedtime 90 tablet 3    Blood Glucose Monitoring Suppl (BLOOD GLUCOSE MONITOR SYSTEM) w/Device KIT Use to monitor blood glucose daily - fill

## 2025-03-12 DIAGNOSIS — E11.9 TYPE 2 DIABETES MELLITUS WITHOUT COMPLICATION, UNSPECIFIED WHETHER LONG TERM INSULIN USE (HCC): ICD-10-CM

## 2025-06-09 DIAGNOSIS — I10 ESSENTIAL (PRIMARY) HYPERTENSION: ICD-10-CM

## 2025-06-09 RX ORDER — LISINOPRIL 20 MG/1
20 TABLET ORAL DAILY
Qty: 90 TABLET | Refills: 1 | Status: SHIPPED | OUTPATIENT
Start: 2025-06-09

## 2025-06-09 RX ORDER — NIFEDIPINE 90 MG/1
90 TABLET, EXTENDED RELEASE ORAL DAILY
Qty: 90 TABLET | Refills: 1 | Status: SHIPPED | OUTPATIENT
Start: 2025-06-09

## 2025-06-09 NOTE — TELEPHONE ENCOUNTER
PCP: Celina Go MD    LAST OFFICE VISIT: 03/11/2025    LAST REFILL PER CHART:  Medication:lisinopril (PRINIVIL;ZESTRIL) 20 MG tablet   Ordered On:12/16/2024  Instructions:TAKE ONE TABLET BY MOUTH ONCE A DAY   Dispense:90 tablets   Refills:1      LAST REFILL PER CHART:  Medication:NIFEdipine (PROCARDIA XL) 90 MG extended release tablet   Ordered On:12/16/2024  Instructions: TAKE ONE TABLET BY MOUTH ONCE A DAY   Dispense:90 tablets  Refills:1    Future Appointments   Date Time Provider Department Center   7/1/2025 11:45 AM IOC LAB VISIT [facility] DEP   7/8/2025  9:20 AM Celina Go MD [facility] DEP

## 2025-07-07 ENCOUNTER — TELEPHONE (OUTPATIENT)
Facility: CLINIC | Age: 40
End: 2025-07-07

## 2025-07-09 DIAGNOSIS — E11.9 TYPE 2 DIABETES MELLITUS WITHOUT COMPLICATION, UNSPECIFIED WHETHER LONG TERM INSULIN USE (HCC): ICD-10-CM

## 2025-07-09 RX ORDER — BLOOD SUGAR DIAGNOSTIC
STRIP MISCELLANEOUS
Qty: 100 STRIP | Refills: 3 | Status: SHIPPED | OUTPATIENT
Start: 2025-07-09

## 2025-07-09 RX ORDER — ATORVASTATIN CALCIUM 10 MG/1
10 TABLET, FILM COATED ORAL NIGHTLY
Qty: 90 TABLET | Refills: 1 | Status: SHIPPED | OUTPATIENT
Start: 2025-07-09